# Patient Record
Sex: FEMALE | Race: BLACK OR AFRICAN AMERICAN | NOT HISPANIC OR LATINO | Employment: PART TIME | URBAN - METROPOLITAN AREA
[De-identification: names, ages, dates, MRNs, and addresses within clinical notes are randomized per-mention and may not be internally consistent; named-entity substitution may affect disease eponyms.]

---

## 2017-02-20 ENCOUNTER — ALLSCRIPTS OFFICE VISIT (OUTPATIENT)
Dept: OTHER | Facility: OTHER | Age: 47
End: 2017-02-20

## 2017-02-20 ENCOUNTER — APPOINTMENT (EMERGENCY)
Dept: ULTRASOUND IMAGING | Facility: HOSPITAL | Age: 47
End: 2017-02-20
Payer: COMMERCIAL

## 2017-02-20 ENCOUNTER — HOSPITAL ENCOUNTER (EMERGENCY)
Facility: HOSPITAL | Age: 47
Discharge: HOME | End: 2017-02-20
Attending: EMERGENCY MEDICINE
Payer: COMMERCIAL

## 2017-02-20 ENCOUNTER — APPOINTMENT (EMERGENCY)
Dept: RADIOLOGY | Facility: HOSPITAL | Age: 47
End: 2017-02-20
Payer: COMMERCIAL

## 2017-02-20 VITALS
RESPIRATION RATE: 20 BRPM | OXYGEN SATURATION: 100 % | TEMPERATURE: 97.5 F | DIASTOLIC BLOOD PRESSURE: 64 MMHG | HEART RATE: 78 BPM | SYSTOLIC BLOOD PRESSURE: 148 MMHG

## 2017-02-20 DIAGNOSIS — R07.89 CHEST DISCOMFORT: Primary | ICD-10-CM

## 2017-02-20 LAB
ALBUMIN SERPL BCP-MCNC: 4.2 G/DL (ref 3.5–5)
ALP SERPL-CCNC: 68 U/L (ref 46–116)
ALT SERPL W P-5'-P-CCNC: 52 U/L (ref 12–78)
ANION GAP SERPL CALCULATED.3IONS-SCNC: 9 MMOL/L (ref 4–13)
AST SERPL W P-5'-P-CCNC: 34 U/L (ref 5–45)
BASOPHILS # BLD AUTO: 0.02 THOUSANDS/ΜL (ref 0–0.1)
BASOPHILS NFR BLD AUTO: 0 % (ref 0–1)
BILIRUB SERPL-MCNC: 0.5 MG/DL (ref 0.2–1)
BUN SERPL-MCNC: 9 MG/DL (ref 5–25)
CALCIUM SERPL-MCNC: 9.3 MG/DL (ref 8.3–10.1)
CHLORIDE SERPL-SCNC: 100 MMOL/L (ref 100–108)
CO2 SERPL-SCNC: 30 MMOL/L (ref 21–32)
CREAT SERPL-MCNC: 0.75 MG/DL (ref 0.6–1.3)
EOSINOPHIL # BLD AUTO: 0.07 THOUSAND/ΜL (ref 0–0.61)
EOSINOPHIL NFR BLD AUTO: 1 % (ref 0–6)
ERYTHROCYTE [DISTWIDTH] IN BLOOD BY AUTOMATED COUNT: 12.9 % (ref 11.6–15.1)
GFR SERPL CREATININE-BSD FRML MDRD: >60 ML/MIN/1.73SQ M
GLUCOSE SERPL-MCNC: 116 MG/DL (ref 65–140)
HCT VFR BLD AUTO: 37 % (ref 34.8–46.1)
HGB BLD-MCNC: 11.9 G/DL (ref 11.5–15.4)
LYMPHOCYTES # BLD AUTO: 2.78 THOUSANDS/ΜL (ref 0.6–4.47)
LYMPHOCYTES NFR BLD AUTO: 38 % (ref 14–44)
MCH RBC QN AUTO: 27.7 PG (ref 26.8–34.3)
MCHC RBC AUTO-ENTMCNC: 32.2 G/DL (ref 31.4–37.4)
MCV RBC AUTO: 86 FL (ref 82–98)
MONOCYTES # BLD AUTO: 0.44 THOUSAND/ΜL (ref 0.17–1.22)
MONOCYTES NFR BLD AUTO: 6 % (ref 4–12)
NEUTROPHILS # BLD AUTO: 4.06 THOUSANDS/ΜL (ref 1.85–7.62)
NEUTS SEG NFR BLD AUTO: 55 % (ref 43–75)
NT-PROBNP SERPL-MCNC: 16 PG/ML
PLATELET # BLD AUTO: 331 THOUSANDS/UL (ref 149–390)
PMV BLD AUTO: 10.2 FL (ref 8.9–12.7)
POTASSIUM SERPL-SCNC: 3.7 MMOL/L (ref 3.5–5.3)
PROT SERPL-MCNC: 8.5 G/DL (ref 6.4–8.2)
RBC # BLD AUTO: 4.29 MILLION/UL (ref 3.81–5.12)
SODIUM SERPL-SCNC: 139 MMOL/L (ref 136–145)
TROPONIN I SERPL-MCNC: <0.02 NG/ML
WBC # BLD AUTO: 7.37 THOUSAND/UL (ref 4.31–10.16)

## 2017-02-20 PROCEDURE — 80053 COMPREHEN METABOLIC PANEL: CPT | Performed by: PHYSICIAN ASSISTANT

## 2017-02-20 PROCEDURE — 83880 ASSAY OF NATRIURETIC PEPTIDE: CPT | Performed by: PHYSICIAN ASSISTANT

## 2017-02-20 PROCEDURE — 85025 COMPLETE CBC W/AUTO DIFF WBC: CPT | Performed by: PHYSICIAN ASSISTANT

## 2017-02-20 PROCEDURE — 71020 HB CHEST X-RAY 2VW FRONTAL&LATL: CPT

## 2017-02-20 PROCEDURE — 36415 COLL VENOUS BLD VENIPUNCTURE: CPT | Performed by: PHYSICIAN ASSISTANT

## 2017-02-20 PROCEDURE — 93971 EXTREMITY STUDY: CPT

## 2017-02-20 PROCEDURE — 84484 ASSAY OF TROPONIN QUANT: CPT | Performed by: PHYSICIAN ASSISTANT

## 2017-02-20 PROCEDURE — 93005 ELECTROCARDIOGRAM TRACING: CPT | Performed by: PHYSICIAN ASSISTANT

## 2017-02-20 PROCEDURE — 99285 EMERGENCY DEPT VISIT HI MDM: CPT

## 2017-02-20 RX ORDER — AMLODIPINE BESYLATE 5 MG/1
TABLET ORAL
COMMUNITY
End: 2018-07-30 | Stop reason: SDUPTHER

## 2017-02-20 RX ORDER — LOSARTAN POTASSIUM 50 MG/1
TABLET ORAL
COMMUNITY
Start: 2016-05-31 | End: 2018-07-30 | Stop reason: SDUPTHER

## 2017-02-20 RX ORDER — HYDROCHLOROTHIAZIDE 25 MG/1
TABLET ORAL
COMMUNITY
Start: 2016-03-14 | End: 2018-04-10 | Stop reason: SDUPTHER

## 2017-02-21 LAB
ATRIAL RATE: 76 BPM
P AXIS: 55 DEGREES
PR INTERVAL: 174 MS
QRS AXIS: 9 DEGREES
QRSD INTERVAL: 86 MS
QT INTERVAL: 376 MS
QTC INTERVAL: 414 MS
T WAVE AXIS: 31 DEGREES
VENTRICULAR RATE: 73 BPM

## 2017-02-22 ENCOUNTER — ALLSCRIPTS OFFICE VISIT (OUTPATIENT)
Dept: OTHER | Facility: OTHER | Age: 47
End: 2017-02-22

## 2017-04-05 ENCOUNTER — HOSPITAL ENCOUNTER (EMERGENCY)
Facility: HOSPITAL | Age: 47
Discharge: HOME | End: 2017-04-05
Attending: EMERGENCY MEDICINE | Admitting: EMERGENCY MEDICINE
Payer: COMMERCIAL

## 2017-04-05 VITALS
OXYGEN SATURATION: 100 % | DIASTOLIC BLOOD PRESSURE: 77 MMHG | RESPIRATION RATE: 18 BRPM | HEART RATE: 95 BPM | WEIGHT: 293 LBS | SYSTOLIC BLOOD PRESSURE: 141 MMHG | TEMPERATURE: 98.4 F

## 2017-04-05 DIAGNOSIS — S09.90XA HEAD INJURY, INITIAL ENCOUNTER: Primary | ICD-10-CM

## 2017-04-05 PROCEDURE — 99284 EMERGENCY DEPT VISIT MOD MDM: CPT

## 2017-05-17 ENCOUNTER — GENERIC CONVERSION - ENCOUNTER (OUTPATIENT)
Dept: OTHER | Facility: OTHER | Age: 47
End: 2017-05-17

## 2017-06-26 ENCOUNTER — GENERIC CONVERSION - ENCOUNTER (OUTPATIENT)
Dept: OTHER | Facility: OTHER | Age: 47
End: 2017-06-26

## 2017-10-04 ENCOUNTER — APPOINTMENT (OUTPATIENT)
Dept: LAB | Facility: HOSPITAL | Age: 47
End: 2017-10-04

## 2017-10-04 ENCOUNTER — ALLSCRIPTS OFFICE VISIT (OUTPATIENT)
Dept: OTHER | Facility: OTHER | Age: 47
End: 2017-10-04

## 2017-10-04 DIAGNOSIS — G47.33 OBSTRUCTIVE SLEEP APNEA: ICD-10-CM

## 2017-10-04 DIAGNOSIS — I10 ESSENTIAL (PRIMARY) HYPERTENSION: ICD-10-CM

## 2017-10-04 DIAGNOSIS — F41.9 ANXIETY DISORDER: ICD-10-CM

## 2017-10-04 DIAGNOSIS — N39.0 URINARY TRACT INFECTION: ICD-10-CM

## 2017-10-04 DIAGNOSIS — R60.0 LOCALIZED EDEMA: ICD-10-CM

## 2017-10-04 DIAGNOSIS — E78.5 HYPERLIPIDEMIA: ICD-10-CM

## 2017-10-04 DIAGNOSIS — E66.01 MORBID (SEVERE) OBESITY DUE TO EXCESS CALORIES (HCC): ICD-10-CM

## 2017-10-04 DIAGNOSIS — R73.01 IMPAIRED FASTING GLUCOSE: ICD-10-CM

## 2017-10-04 LAB
BILIRUB UR QL STRIP: NORMAL
COLOR UR: NORMAL
GLUCOSE (HISTORICAL): NORMAL
HGB UR QL STRIP.AUTO: NORMAL
KETONES UR STRIP-MCNC: NORMAL MG/DL
LEUKOCYTE ESTERASE UR QL STRIP: NORMAL
NITRITE UR QL STRIP: NORMAL
PH UR STRIP.AUTO: 5 [PH]
PROT UR STRIP-MCNC: NORMAL MG/DL
SP GR UR STRIP.AUTO: 1.03
UROBILINOGEN UR QL STRIP.AUTO: 3

## 2017-10-04 PROCEDURE — 87086 URINE CULTURE/COLONY COUNT: CPT

## 2017-10-05 NOTE — PROGRESS NOTES
Assessment  1  UTI (urinary tract infection), bacterial (599 0,041 9) (N39 0,A49 9)   2  Morbid obesity due to excess calories (278 01) (E66 01)   3  Impaired fasting glucose (790 21) (R73 01)   4  Paresthesia of right foot (782 0) (R20 2)    Plan  Anxiety, Bilateral leg edema, Essential hypertension, Hyperlipidemia    · (1) TSH WITH FT4 REFLEX; Status:Active; Requested [id number]; Anxiety, Bilateral leg edema, Essential hypertension, Hyperlipidemia, Impaired fasting  glucose, Morbid obesity due to excess calories, Obstructive sleep apnea    · (1) COMPREHENSIVE METABOLIC PANEL; Status:Active; Requested for:04Oct2017;   Bilateral leg edema, Essential hypertension, Hyperlipidemia, Impaired fasting glucose,  Morbid obesity due to excess calories    · (1) CBC/PLT/DIFF; Status:Active; Requested for:04Oct2017;   Burning with urination    · Urine Dip Automated- POC; Status:Complete;   Done: 55FPN6865 07:58AM  Essential hypertension, Hyperlipidemia, Morbid obesity due to excess calories    · (1) LIPID PANEL FASTING W DIRECT LDL REFLEX; Status:Active; Requested  for:04Oct2017;   Impaired fasting glucose    · (1) HEMOGLOBIN A1C; Status:Active; Requested for:04Oct2017;   UTI (urinary tract infection), bacterial    · Ciprofloxacin HCl - 250 MG Oral Tablet; Take 1 tablet twice daily   · Phenazopyridine HCl - 200 MG Oral Tablet; Take one three times daily as needed  for burning   · (1) URINE CULTURE; Source:Urine, Clean Catch; Status:Active; Requested  CFL:[id number];     Discussion/Summary    1  UTI- Urine dip in office +leuks/ nitrates with +/- glucose  Will start Cipro as ordered and send urine out for culture  May take Pyridium prn  SE reviewed  Increase PO fluids, may try pure cranberry juice  Proper feminine hygiene reviewed  Tingling sensation right foot- will check labs given her H/O IFG  No rashes or wounds on exam  Continue compression stockings as these have been helping her symptoms  Encouraged weight loss  IFG- labs as ordered  Follow a low carb diet, regularly exercise  Obesity- weight loss advised  2 weeks for a f/u of chronic medical conditions/ review labs  Possible side effects of new medications were reviewed with the patient/guardian today  The treatment plan was reviewed with the patient/guardian  The patient/guardian understands and agrees with the treatment plan      Chief Complaint  Patient here with tingling in right leg and burning sensation in right foot on and off since last week  Also here with UTI symptoms frequency and burning with urination for 3 days  History of Present Illness  HPI: Pt presents by herself today for an acute visit  dysuria for the past 3 days frequency test was positive blood in urine or cloudy/ malodorous urineflank or abdominal pain, N/V/D, F/C  c/o an intermittent tingling sensation in her right footupward to medial ankle be a burning sensation as wellon the bottom of her right footwounds or rashesinjuries have an IFG, last at 123 in April 2016- she has been overdue for a routine follow up of chronic conditions for the past several months is concerned about diabetes, would like blood work done also thinks her weight contributes to her issues          Review of Systems    Constitutional: no fever,-not feeling poorly,-no chills-and-not feeling tired  Cardiovascular: lower extremity edema, but-no chest pain,-no intermittent leg claudication-and-no palpitations  Respiratory: no shortness of breath,-no cough,-no wheezing-and-no shortness of breath during exertion  Gastrointestinal: no abdominal pain,-no nausea,-no constipation-and-no diarrhea  Genitourinary: dysuria, but-no pelvic pain-and-no incontinence  Integumentary: no rashes-and-no skin wound  Neurological: numbness-and-tingling, but-as noted in HPI  ROS reviewed  Active Problems  1  Anxiety (300 00) (F41 9)   2  Bilateral leg edema (782 3) (R60 0)   3  Dyspnea (786 09) (R06 00)   4  Encounter for cervical Pap smear with pelvic exam (V76 2,V72 31) (Z01 419)   5  Encounter for screening mammogram for breast cancer (V76 12) (Z12 31)   6  Essential hypertension (401 9) (I10)   7  Hyperlipidemia (272 4) (E78 5)   8  Impaired fasting glucose (790 21) (R73 01)   9  Morbid obesity due to excess calories (278 01) (E66 01)   10  Obstructive sleep apnea (327 23) (G47 33)   11  Osteoarthritis of knee (715 36) (M17 10)   12  Right calf pain (729 5) (M79 661)   13  Seasonal allergies (477 9) (J30 2)   14  Venous stasis dermatitis (454 1) (I87 2)    Past Medical History  1  History of Achilles tendinitis, unspecified laterality   2  History of Acute maxillary sinusitis, recurrence not specified (461 0) (J01 00)   3  History of Acute non-recurrent maxillary sinusitis (461 0) (J01 00)   4  History of Acute serous otitis media, unspecified laterality   5  History of Acute upper respiratory infection (465 9) (J06 9)   6  History of Elevated blood pressure reading without diagnosis of hypertension (796 2)   (R03 0)   7  History of Encounter for screening mammogram for breast cancer (V76 12) (Z12 31)   8  History of acute bronchitis with bronchospasm (V12 69) (Z87 09)   9  History of anemia (V12 3) (Z86 2)   10  History of athlete's foot (V12 09) (Z86 19)   11  History of atopic dermatitis (V13 3) (Z87 2)   12  History of bronchitis (V12 69) (Z87 09)   13  History of bursitis (V13 59) (Z87 39)   14  History of chest pain (V13 89) (Z87 898)   15  History of dermatitis (V13 3) (Z87 2)   16  History of eczema (V13 3) (Z87 2)   17  History of fatigue (V13 89) (Z87 898)   18  History of scabies (V12 09) (Z86 19)   19  History of sinusitis (V12 69) (Z87 09)   20  History of upper respiratory infection (V12 09) (Z87 09)   21  History of upper respiratory infection (V12 09) (Z87 09)   22  History of Otitis media of both ears (382 9) (J76 93)   23  History of Plantar fasciitis (418 71) (M72 2)   24   History of Reactive airway disease (493 90) (J45 909)  Active Problems And Past Medical History Reviewed: The active problems and past medical history were reviewed and updated today  Family History  Father    1  Family history of Anemia (V18 2)   2  Family history of Hypertension (V17 49)   3  Family history of Stroke Syndrome (V17 1)  Maternal Grandmother    4  Family history of Breast Cancer (V16 3)   5  Family history of Diabetes Mellitus (V18 0)  Paternal Cousin    10  Family history of myocardial infarction (V17 3) (Z82 49)  Family History    7  Family history of Cancer    Social History   · Being A Social Drinker   · Never A Smoker  The social history was reviewed and updated today  The social history was reviewed and is unchanged  Surgical History  1  History of Oral Surgery Tooth Extraction    Current Meds   1  AmLODIPine Besylate 5 MG Oral Tablet; take 1 tablet bid  Requested for: 36IIM1660;   Last MW:[id number] Ordered   2  Aspirin 81 MG Oral Tablet Delayed Release; take 1 tab daily; Therapy: 03Xti9546 to Recorded   3  Fluticasone Propionate 50 MCG/ACT Nasal Suspension; use 2 spr  in each nostril daily; Therapy: 78NVX5772 to (Last Rx:24Nov2015)  Requested for: 90KDA5869 Ordered   4  Garlic 5192 MG Oral Tablet Delayed Release Recorded   5  HydroCHLOROthiazide 25 MG Oral Tablet; Take 1 tablet daily; Therapy: 64TXI5908 to (Margarita Arvizu)  Requested for: 26MGO9656; Last   [id number] Ordered   6  Losartan Potassium 50 MG Oral Tablet; take 1 tab daily; Therapy: 10LOS1537 to (Margarita Arvizu)  Requested for: 21JMY6464; Last   [id number]:[id number] Ordered   7  Lysine 500 MG TABS Recorded   8  Omega 3 1000 MG Oral Capsule; Take 1 caps BID; Therapy: 69KRS1126 to (Last TE:31ZRI4207) Ordered    The medication list was reviewed and updated today  Allergies  1  CeleBREX CAPS  2   Animal dander - Cats    Vitals   Recorded: 46MLV3488 07:47AM   Temperature 97 4 F, Tympanic   Heart Rate 80, L Radial   Respiration 16 Systolic 807, LUE   Diastolic 82, LUE   Height 5 ft 5 in   Weight 279 lb    BMI Calculated 46 43   BSA Calculated 2 28   Pain Scale 0     Physical Exam    Constitutional   General appearance: No acute distress, well appearing and well nourished  Eyes   Conjunctiva and lids: No swelling, erythema or discharge  Pupils and irises: Equal, round and reactive to light  Pulmonary   Respiratory effort: No increased work of breathing or signs of respiratory distress  Auscultation of lungs: Clear to auscultation  Cardiovascular   Auscultation of heart: Normal rate and rhythm, normal S1 and S2, without murmurs  Examination of extremities for edema and/or varicosities: Abnormal  -(trace B/L LE edema)   Abdomen   Abdomen: Non-tender, no masses  The abdomen was obese  Bowel sounds were normal  The abdomen was soft and nontender  no CVA tenderness   Musculoskeletal   Gait and station: Normal  -Normal sensation to B/L LE/ feet  Wearing compression stockings which were removed for exam    Skin   Skin and subcutaneous tissue: Normal without rashes or lesions  Psychiatric   Orientation to person, place, and time: Normal     Mood and affect: Normal          Results/Data  Urine Dip Automated- POC 32PDZ8201 07:58AM Mook Nettles     Test Name Result Flag Reference   Color Orange     Leukocytes 3+     Nitrite +     Blood neg     Bilirubin neg     Urobilinogen 3     Protein +     Ph 5 0     Specific Gravity 1 030     Ketone 3+     Glucose +         Attending Note  Collaborating Physician Note: Collaborating Note: I did not interview and examine the patient-and-I agree with the Advanced Practitioner note  Signatures   Electronically signed by : SARAH Lockwood; Oct  4 2017  8:48AM EST                       (Author)    Electronically signed by :  Tracey Chambers MD; Oct  4 2017  4:53PM EST                       (Author)

## 2017-10-06 ENCOUNTER — GENERIC CONVERSION - ENCOUNTER (OUTPATIENT)
Dept: OTHER | Facility: OTHER | Age: 47
End: 2017-10-06

## 2017-10-06 LAB — BACTERIA UR CULT: NORMAL

## 2017-11-10 ENCOUNTER — APPOINTMENT (OUTPATIENT)
Dept: RADIOLOGY | Facility: CLINIC | Age: 47
End: 2017-11-10
Payer: COMMERCIAL

## 2017-11-10 ENCOUNTER — ALLSCRIPTS OFFICE VISIT (OUTPATIENT)
Dept: OTHER | Facility: OTHER | Age: 47
End: 2017-11-10

## 2017-11-10 DIAGNOSIS — M25.579 PAIN IN ANKLE: ICD-10-CM

## 2017-11-10 DIAGNOSIS — S92.009A CLOSED FRACTURE OF CALCANEUS: ICD-10-CM

## 2017-11-10 DIAGNOSIS — M89.9 DISORDER OF BONE: ICD-10-CM

## 2017-11-10 DIAGNOSIS — M25.569 PAIN IN KNEE: ICD-10-CM

## 2017-11-10 PROCEDURE — 73564 X-RAY EXAM KNEE 4 OR MORE: CPT

## 2017-11-10 PROCEDURE — 73610 X-RAY EXAM OF ANKLE: CPT

## 2017-11-13 ENCOUNTER — TRANSCRIBE ORDERS (OUTPATIENT)
Dept: ADMINISTRATIVE | Facility: HOSPITAL | Age: 47
End: 2017-11-13

## 2017-11-13 DIAGNOSIS — M25.579 PAIN IN JOINT INVOLVING ANKLE AND FOOT, UNSPECIFIED LATERALITY: Primary | ICD-10-CM

## 2017-11-17 ENCOUNTER — HOSPITAL ENCOUNTER (OUTPATIENT)
Dept: RADIOLOGY | Age: 47
Discharge: HOME | End: 2017-11-17
Payer: COMMERCIAL

## 2017-11-17 DIAGNOSIS — M25.579 PAIN IN ANKLE: ICD-10-CM

## 2017-11-17 PROCEDURE — 73721 MRI JNT OF LWR EXTRE W/O DYE: CPT

## 2017-11-20 ENCOUNTER — APPOINTMENT (OUTPATIENT)
Dept: RADIOLOGY | Facility: CLINIC | Age: 47
End: 2017-11-20
Payer: COMMERCIAL

## 2017-11-20 ENCOUNTER — GENERIC CONVERSION - ENCOUNTER (OUTPATIENT)
Dept: OTHER | Facility: OTHER | Age: 47
End: 2017-11-20

## 2017-11-20 DIAGNOSIS — M25.569 PAIN IN KNEE: ICD-10-CM

## 2017-11-20 PROCEDURE — 73590 X-RAY EXAM OF LOWER LEG: CPT

## 2017-11-20 PROCEDURE — 73562 X-RAY EXAM OF KNEE 3: CPT

## 2017-11-24 ENCOUNTER — TRANSCRIBE ORDERS (OUTPATIENT)
Dept: ADMINISTRATIVE | Facility: HOSPITAL | Age: 47
End: 2017-11-24

## 2017-11-24 DIAGNOSIS — M89.9 BONE DISEASE: Primary | ICD-10-CM

## 2018-01-12 NOTE — RESULT NOTES
Message   Call patient  Blood work showed elevated blood sugar and cholesterol  Advise patient to schedule followup office visit  to discuss lab results,  treatment options  Verified Results  (1) COMPREHENSIVE METABOLIC PANEL 03GAR8200 41:60BA CubeTreenancy Kiran     Test Name Result Flag Reference   GLUCOSE 123 mg/dL H 65-99   Fasting reference interval   UREA NITROGEN (BUN) 16 mg/dL  7-25   CREATININE 0 69 mg/dL  0 50-1 10   eGFR NON-AFR  AMERICAN 106 mL/min/1 73m2  > OR = 60   eGFR AFRICAN AMERICAN 123 mL/min/1 73m2  > OR = 60   BUN/CREATININE RATIO   8-43   NOT APPLICABLE (calc)   SODIUM 138 mmol/L  135-146   POTASSIUM 4 1 mmol/L  3 5-5 3   CHLORIDE 101 mmol/L     CARBON DIOXIDE 27 mmol/L  19-30   CALCIUM 9 3 mg/dL  8 6-10 2   PROTEIN, TOTAL 7 2 g/dL  6 1-8 1   ALBUMIN 4 2 g/dL  3 6-5 1   GLOBULIN 3 0 g/dL (calc)  1 9-3 7   ALBUMIN/GLOBULIN RATIO 1 4 (calc)  1 0-2 5   BILIRUBIN, TOTAL 0 4 mg/dL  0 2-1 2   ALKALINE PHOSPHATASE 65 U/L     AST 21 U/L  10-30   ALT 22 U/L  6-29     (1) LIPID PANEL, FASTING 25Apr2016 08:20AM Mendeleymerry Kiran     Test Name Result Flag Reference   CHOLESTEROL, TOTAL 239 mg/dL H 125-200   HDL CHOLESTEROL 31 mg/dL L > OR = 46   TRIGLICERIDES 088 mg/dL H <150   LDL-CHOLESTEROL 172 mg/dL (calc) H <130   Desirable range <100 mg/dL for patients with CHD or  diabetes and <70 mg/dL for diabetic patients with  known heart disease  CHOL/HDLC RATIO 7 7 (calc) H < OR = 5 0   NON HDL CHOLESTEROL 208 mg/dL (calc) H    Target for non-HDL cholesterol is 30 mg/dL higher than   LDL cholesterol target       (Q) TSH, 3RD GENERATION 25Apr2016 08:20AM [facility] Kiran   REPORT COMMENT:  FASTING:YES     Test Name Result Flag Reference   TSH 2 79 mIU/L     Reference Range                         > or = 20 Years  0 40-4 50                              Pregnancy Ranges            First trimester    0 26-2 66            Second trimester   0 55-2 73            Third trimester 0  43-2 91

## 2018-01-12 NOTE — RESULT NOTES
Verified Results  (1) URINE CULTURE 81WMC1092 12:46PM Aida Golden Order Number: [id number]     Test Name Result Flag Reference   CLINICAL REPORT (Report)     Test:        Urine culture  Specimen Type:   Urine  Specimen Date:   10/4/2017 12:46 PM  Result Date:    10/6/2017 8:15 AM  Result Status:   Final result  Resulting Lab:   [place] [address]            Tel: [phone]      CULTURE                                       ------------------                                   <10,000 cfu/ml      *** Mixed Contaminants X3 ***

## 2018-01-13 VITALS
HEIGHT: 65 IN | TEMPERATURE: 98.3 F | BODY MASS INDEX: 48.82 KG/M2 | HEART RATE: 100 BPM | WEIGHT: 293 LBS | DIASTOLIC BLOOD PRESSURE: 82 MMHG | OXYGEN SATURATION: 100 % | SYSTOLIC BLOOD PRESSURE: 120 MMHG | RESPIRATION RATE: 16 BRPM

## 2018-01-13 VITALS
HEIGHT: 65 IN | RESPIRATION RATE: 16 BRPM | WEIGHT: 279 LBS | TEMPERATURE: 97.4 F | DIASTOLIC BLOOD PRESSURE: 82 MMHG | HEART RATE: 80 BPM | BODY MASS INDEX: 46.48 KG/M2 | SYSTOLIC BLOOD PRESSURE: 110 MMHG

## 2018-01-13 VITALS
BODY MASS INDEX: 48.82 KG/M2 | TEMPERATURE: 97.3 F | OXYGEN SATURATION: 97 % | RESPIRATION RATE: 16 BRPM | HEART RATE: 88 BPM | DIASTOLIC BLOOD PRESSURE: 86 MMHG | HEIGHT: 65 IN | SYSTOLIC BLOOD PRESSURE: 118 MMHG | WEIGHT: 293 LBS

## 2018-01-13 VITALS
BODY MASS INDEX: 44.86 KG/M2 | WEIGHT: 269.25 LBS | HEIGHT: 65 IN | SYSTOLIC BLOOD PRESSURE: 109 MMHG | DIASTOLIC BLOOD PRESSURE: 73 MMHG | HEART RATE: 101 BPM

## 2018-01-13 NOTE — MISCELLANEOUS
Message  Return to work or school:   Brown Menard is under my professional care  She was seen in my office on 11/10/17     She is not able to participate in sports or gym class  Weight Bearing Status: No Weight-Bearing  I recommend patient remain non weight bearing and rest at this time  I recommend against work until further evaluation at next visit          Signatures   Electronically signed by : Rossana Domínguez DO; Nov 13 2017  1:05PM EST                       (Author)

## 2018-01-22 VITALS
SYSTOLIC BLOOD PRESSURE: 118 MMHG | WEIGHT: 269.25 LBS | DIASTOLIC BLOOD PRESSURE: 74 MMHG | HEART RATE: 75 BPM | BODY MASS INDEX: 44.86 KG/M2 | HEIGHT: 65 IN

## 2018-04-10 DIAGNOSIS — I10 ESSENTIAL HYPERTENSION: Primary | ICD-10-CM

## 2018-04-10 RX ORDER — HYDROCHLOROTHIAZIDE 25 MG/1
TABLET ORAL
Qty: 90 TABLET | Refills: 1 | Status: SHIPPED | OUTPATIENT
Start: 2018-04-10 | End: 2018-10-07 | Stop reason: SDUPTHER

## 2018-04-20 ENCOUNTER — TELEPHONE (OUTPATIENT)
Dept: FAMILY MEDICINE CLINIC | Facility: CLINIC | Age: 48
End: 2018-04-20

## 2018-04-20 DIAGNOSIS — J30.1 SEASONAL ALLERGIC RHINITIS DUE TO POLLEN: Primary | ICD-10-CM

## 2018-04-20 RX ORDER — FLUTICASONE PROPIONATE 50 MCG
2 SPRAY, SUSPENSION (ML) NASAL DAILY
Qty: 16 G | Refills: 3 | Status: SHIPPED | OUTPATIENT
Start: 2018-04-20 | End: 2019-03-21 | Stop reason: SDUPTHER

## 2018-07-02 PROBLEM — R06.00 DYSPNEA: Status: ACTIVE | Noted: 2017-02-20

## 2018-07-02 PROBLEM — I87.2 VENOUS STASIS DERMATITIS: Status: ACTIVE | Noted: 2017-02-22

## 2018-07-02 PROBLEM — R60.0 BILATERAL LEG EDEMA: Status: ACTIVE | Noted: 2017-02-22

## 2018-07-02 RX ORDER — CHLORAL HYDRATE 500 MG
CAPSULE ORAL 2 TIMES DAILY
COMMUNITY
Start: 2016-05-09 | End: 2020-06-18 | Stop reason: ALTCHOICE

## 2018-07-03 ENCOUNTER — OFFICE VISIT (OUTPATIENT)
Dept: FAMILY MEDICINE CLINIC | Facility: CLINIC | Age: 48
End: 2018-07-03
Payer: COMMERCIAL

## 2018-07-03 VITALS
RESPIRATION RATE: 16 BRPM | HEART RATE: 60 BPM | DIASTOLIC BLOOD PRESSURE: 82 MMHG | TEMPERATURE: 97.2 F | SYSTOLIC BLOOD PRESSURE: 120 MMHG | WEIGHT: 261.6 LBS | BODY MASS INDEX: 43.53 KG/M2

## 2018-07-03 DIAGNOSIS — R11.0 NAUSEA: Primary | ICD-10-CM

## 2018-07-03 DIAGNOSIS — R09.82 PND (POST-NASAL DRIP): ICD-10-CM

## 2018-07-03 DIAGNOSIS — Z12.39 SCREENING FOR MALIGNANT NEOPLASM OF BREAST: ICD-10-CM

## 2018-07-03 PROCEDURE — 99213 OFFICE O/P EST LOW 20 MIN: CPT | Performed by: NURSE PRACTITIONER

## 2018-07-03 RX ORDER — ONDANSETRON 4 MG/1
TABLET, FILM COATED ORAL
Qty: 20 TABLET | Refills: 0 | Status: SHIPPED | OUTPATIENT
Start: 2018-07-03 | End: 2020-03-24 | Stop reason: SDUPTHER

## 2018-07-03 NOTE — PROGRESS NOTES
Chief Complaint   Patient presents with    Nausea     Symptoms nausea and bloating since last Wednesday  Assessment/Plan:    1  Nausea-  Likely related to her increase in PND  If nausea persists despite treating PND, may take Zofran 4mg one tab Q6-8h prn  Peppermint, yimi, etc   Avoid spicy foods, dairy products, caffeine  Call if nausea worsens or persists    2  PND- continue Flonase 2 sprays/ nostril once daily  Stop Claritin and start Zyrtec to see if symptoms are better controlled on this    3  Mammogram ordered today  Advised to schedule a  visit in the upcoming weeks  Advised to have blood work done now but she declined this  Stated she wants to work on her new diet prior to having any labs drawn     Diagnoses and all orders for this visit:    Nausea  -     ondansetron (ZOFRAN) 4 mg tablet; Take one tab every 6-8 hours prn for nausea    Screening for malignant neoplasm of breast  -     Mammo screening bilateral w cad; Future    PND (post-nasal drip)    Other orders  -     b complex vitamins tablet; Take 1 tablet by mouth daily          Subjective:      Patient ID: Gege Meeks is a 56 y o  female      HPI   Pt presents by herself today for an acute visit     C/o bouts of nausea on and off for the past week  Notes this isn't uncommon for her, but a little worse   Reports her PND has also been worse- she feels this is causing her nausea  No abdominal pain, vomiting, diarrhea, constipation   No change in appetite or unexpected weight loss   No correlation with eating and her nausea  Reports she does get reflux at times but this has been well controlled (tries to avoid eating before bed/ lying flat)    Reports she just changed her diet to plant based  Has some abdominal bloating at times (noticed after changing her diet)    For her PND, takes Claritin nightly and uses Flonase nasal spray     Also requesting a script for a mammogram   Has never had a mammogram before     The following portions of the patient's history were reviewed and updated as appropriate: allergies, current medications, past medical history, past social history and problem list     Review of Systems   Constitutional: Negative for chills, diaphoresis, fatigue and fever  Respiratory: Negative for chest tightness, shortness of breath and wheezing  Cardiovascular: Negative for chest pain, palpitations and leg swelling  Gastrointestinal: Positive for nausea  Negative for abdominal pain, blood in stool, constipation and diarrhea  Genitourinary: Negative for dysuria  Musculoskeletal: Negative for arthralgias and myalgias  Skin: Negative for rash and wound  Neurological: Negative for dizziness and headaches  Hematological: Negative for adenopathy  Does not bruise/bleed easily  Objective:      /82 (BP Location: Left arm, Patient Position: Sitting, Cuff Size: Large)   Pulse 60   Temp (!) 97 2 °F (36 2 °C) (Tympanic)   Resp 16   Wt 119 kg (261 lb 9 6 oz)   BMI 43 53 kg/m²          Physical Exam   Constitutional: She is oriented to person, place, and time  She appears well-developed and well-nourished  No distress  HENT:   Head: Normocephalic and atraumatic  Eyes: Conjunctivae are normal  Pupils are equal, round, and reactive to light  Cardiovascular: Normal rate, regular rhythm and normal heart sounds  No murmur heard  Pulmonary/Chest: Effort normal and breath sounds normal  No respiratory distress  She has no wheezes  Abdominal: Soft  Bowel sounds are normal  She exhibits no distension, no fluid wave, no ascites and no mass  There is no hepatosplenomegaly  There is no tenderness  There is no rigidity, no rebound, no guarding, no CVA tenderness, no tenderness at McBurney's point and negative Brumfield's sign  Neurological: She is alert and oriented to person, place, and time  Skin: Skin is warm and dry  She is not diaphoretic  Psychiatric: She has a normal mood and affect

## 2018-07-03 NOTE — PATIENT INSTRUCTIONS
Stop Claritin and start Zyrtec as needed  Continue Flonase    May take Zofran 4mg one tab every 6-8 hours as needed for nausea  Peppermint, ginger, etc    Schedule mammogram

## 2018-07-30 DIAGNOSIS — I10 ESSENTIAL HYPERTENSION: Primary | ICD-10-CM

## 2018-07-30 RX ORDER — AMLODIPINE BESYLATE 5 MG/1
TABLET ORAL
Qty: 180 TABLET | Refills: 3 | Status: SHIPPED | OUTPATIENT
Start: 2018-07-30 | End: 2019-07-28 | Stop reason: SDUPTHER

## 2018-07-30 RX ORDER — LOSARTAN POTASSIUM 50 MG/1
TABLET ORAL
Qty: 90 TABLET | Refills: 3 | Status: SHIPPED | OUTPATIENT
Start: 2018-07-30 | End: 2019-07-28 | Stop reason: SDUPTHER

## 2018-08-23 ENCOUNTER — HOSPITAL ENCOUNTER (EMERGENCY)
Facility: HOSPITAL | Age: 48
Discharge: HOME | End: 2018-08-23
Attending: EMERGENCY MEDICINE | Admitting: EMERGENCY MEDICINE
Payer: COMMERCIAL

## 2018-08-23 ENCOUNTER — APPOINTMENT (EMERGENCY)
Dept: RADIOLOGY | Facility: HOSPITAL | Age: 48
End: 2018-08-23
Payer: COMMERCIAL

## 2018-08-23 VITALS
HEART RATE: 64 BPM | OXYGEN SATURATION: 98 % | SYSTOLIC BLOOD PRESSURE: 124 MMHG | RESPIRATION RATE: 18 BRPM | WEIGHT: 262.35 LBS | TEMPERATURE: 98 F | DIASTOLIC BLOOD PRESSURE: 78 MMHG | HEIGHT: 65 IN | BODY MASS INDEX: 43.71 KG/M2

## 2018-08-23 DIAGNOSIS — M46.1 SACROILIITIS (HCC): Primary | ICD-10-CM

## 2018-08-23 PROCEDURE — 99283 EMERGENCY DEPT VISIT LOW MDM: CPT

## 2018-08-23 PROCEDURE — 72200 X-RAY EXAM SI JOINTS: CPT

## 2018-08-23 RX ORDER — LIDOCAINE 50 MG/G
1 PATCH TOPICAL DAILY
Qty: 3 PATCH | Refills: 0 | Status: SHIPPED | OUTPATIENT
Start: 2018-08-23 | End: 2022-03-14 | Stop reason: ALTCHOICE

## 2018-08-23 RX ORDER — LIDOCAINE 50 MG/G
1 PATCH TOPICAL ONCE
Status: DISCONTINUED | OUTPATIENT
Start: 2018-08-23 | End: 2018-08-23 | Stop reason: HOSPADM

## 2018-08-23 RX ADMIN — LIDOCAINE 1 PATCH: 50 PATCH TOPICAL at 08:02

## 2018-08-23 RX ADMIN — DEXAMETHASONE SODIUM PHOSPHATE 10 MG: 10 INJECTION, SOLUTION INTRAMUSCULAR; INTRAVENOUS at 08:02

## 2018-08-23 NOTE — ED PROVIDER NOTES
History  Chief Complaint   Patient presents with    Back Pain     pt with lower back pain down her left leg has been going on for approx 1 week and is not getting any better      HPI  This patient is a 70-year-old woman who presents complaining of left lower back pain  The patient states that 1 week ago she had onset of left lower back pain after sitting in cars multiple times for prolonged periods  The pain is achy, intermittent but has been getting more severe and with more consistency over the past few days  There is radiation of pain down to the buttocks  The pain is worse with certain movements  No radiation of pain down the leg itself  No weakness, numbness, tingling in legs  No urinary retention  No bowel or bladder incontinence  No fever or chills  No dysuria  Patient has tried Aleve with minimal relief  Prior to Admission Medications   Prescriptions Last Dose Informant Patient Reported? Taking?    Garlic 0637 MG TBEC  Self Yes No   Sig: Take by mouth   Omega-3 1000 MG CAPS  Self Yes No   Sig: Take by mouth 2 (two) times a day   amLODIPine (NORVASC) 5 mg tablet   No No   Sig: TAKE 1 TABLET TWICE A DAY   aspirin 81 MG tablet  Self Yes No   Sig: Take 1 tablet by mouth daily   b complex vitamins tablet   Yes No   Sig: Take 1 tablet by mouth daily   fluticasone (FLONASE) 50 mcg/act nasal spray  Self No No   Si sprays into each nostril daily   hydrochlorothiazide (HYDRODIURIL) 25 mg tablet  Self No No   Sig: TAKE 1 TABLET DAILY   losartan (COZAAR) 50 mg tablet   No No   Sig: TAKE 1 TABLET DAILY   lysine 500 MG TABS  Self Yes No   Sig: Take by mouth   ondansetron (ZOFRAN) 4 mg tablet   No No   Sig: Take one tab every 6-8 hours prn for nausea      Facility-Administered Medications: None       Past Medical History:   Diagnosis Date    Anemia     Last Assessed: 2013    Concussion     Eczema     Hypertension     Reactive airway disease     Sleep apnea        Past Surgical History: Procedure Laterality Date    TOOTH EXTRACTION         Family History   Problem Relation Age of Onset    Anemia Father     Hypertension Father     Stroke Father         stroke syndrome    Breast cancer Maternal Grandmother     Diabetes Maternal Grandmother     Heart attack Cousin      I have reviewed and agree with the history as documented  Social History   Substance Use Topics    Smoking status: Never Smoker    Smokeless tobacco: Never Used    Alcohol use No      Comment: Per [employer]: socially        Review of Systems   Constitutional: Negative for chills and fever  Respiratory: Negative for cough and shortness of breath  Cardiovascular: Negative for chest pain  Gastrointestinal: Negative for abdominal pain, nausea and vomiting  Genitourinary: Negative for dysuria  Musculoskeletal: Positive for back pain  Skin: Negative for rash  Neurological: Negative for weakness and numbness  Physical Exam  Physical Exam   Constitutional: She appears well-developed and well-nourished  HENT:   Head: Normocephalic and atraumatic  Eyes: Conjunctivae are normal  Pupils are equal, round, and reactive to light  Neck: Normal range of motion  Cardiovascular: Normal rate, regular rhythm, normal heart sounds and intact distal pulses  Pulmonary/Chest: Effort normal and breath sounds normal  No respiratory distress  Abdominal: Soft  She exhibits no distension  There is no tenderness  Musculoskeletal: Normal range of motion  She exhibits no edema  No L-spine tenderness  No step-off or deformity  There is tenderness over the left SI joint  Distal neurovascular intact  Neurological: She is alert  No focal deficit   Vitals reviewed        Vital Signs  ED Triage Vitals   Temperature Pulse Respirations Blood Pressure SpO2   08/23/18 0706 08/23/18 0706 08/23/18 0706 08/23/18 0706 08/23/18 0706   98 °F (36 7 °C) 78 18 127/77 100 %      Temp Source Heart Rate Source Patient Position - Orthostatic VS BP Location FiO2 (%)   08/23/18 0706 -- 08/23/18 0815 08/23/18 0706 --   Oral  Lying Left arm       Pain Score       08/23/18 0703       7           Vitals:    08/23/18 0706 08/23/18 0815   BP: 127/77 124/78   Pulse: 78 64   Patient Position - Orthostatic VS:  Lying       Visual Acuity      ED Medications  Medications   lidocaine (LIDODERM) 5 % patch 1 patch (1 patch Transdermal Medication Applied 8/23/18 0802)   dexamethasone 10 mg/mL oral liquid 10 mg 1 mL (10 mg Oral Given 8/23/18 0802)       Diagnostic Studies  Results Reviewed     None                 XR sacroiliac joints < 3 views   ED Interpretation by Roseanne Lazo MD (08/23 0759)   No acute abnormality  Mild decrease in left SI joint space as compared to right  Procedures  Procedures       Phone Contacts  ED Phone Contact    ED Course                               MDM  Number of Diagnoses or Management Options  [facility]   Diagnosis management comments: 67-year-old woman presents with acute left lower back pain  No flags on history or exam   History exam consistent with left sacroiliitis  Discussed conservative management with steroids, NSAIDS, lidoderm patch  Recommended considering physical therapy if no improvement in the next few days  Will follow-up with primary care  Amount and/or Complexity of Data Reviewed  Tests in the radiology section of CPT®: ordered and reviewed      CritCare Time    Disposition  Final diagnoses:   Sacroiliitis ([address] )     Time reflects when diagnosis was documented in both MDM as applicable and the Disposition within this note     Time User Action Codes Description Comment    8/23/2018  8:00 AM Conrad Loffler, Justus Boeck A Add [M46 1] Sacroiliitis [facility])       ED Disposition     ED Disposition Condition Comment    Discharge  [address] [place] discharge to home/self care      Condition at discharge: Good        Follow-up Information     Follow up With Specialties Details Why Contact Info Fausto Castellanos MD Family Medicine Call in 1 day  [address] [facility]  [phone]            Patient's Medications   Discharge Prescriptions    LIDOCAINE (LIDODERM) 5 %    Place 1 patch on the skin daily for 3 days Remove & Discard patch within 12 hours or as directed by MD       Start Date: 8/23/2018 End Date: 8/26/2018       Order Dose: 1 patch       Quantity: 3 patch    Refills: 0     No discharge procedures on file      ED Provider  Electronically Signed by           Carmen Evans MD  08/23/18 7196

## 2018-08-23 NOTE — DISCHARGE INSTRUCTIONS
Sacroiliitis   WHAT YOU NEED TO KNOW:   Sacroiliitis is a painful swelling of one or both of your sacroiliac joints that lasts at least 3 months  The sacroiliac joint connects your pelvis to the base of your spine  DISCHARGE INSTRUCTIONS:   Medicines:  Ask for more information about these and other medicines you may need to treat sacroiliitis:  · Pain medicine: You may be given medicine to take away or decrease pain  Do not wait until the pain is severe before you take your medicine  You may be given the medicine as a pill to swallow or as a lotion that you put on the painful area  · NSAIDs  help decrease swelling and pain or fever  This medicine is available with or without a doctor's order  NSAIDs can cause stomach bleeding or kidney problems in certain people  If you take blood thinner medicine, always ask your healthcare provider if NSAIDs are safe for you  Always read the medicine label and follow directions  · Muscle relaxers  help decrease pain and muscle spasms  · Take your medicine as directed  Contact your healthcare provider if you think your medicine is not helping or if you have side effects  Tell him of her if you are allergic to any medicine  Keep a list of the medicines, vitamins, and herbs you take  Include the amounts, and when and why you take them  Bring the list or the pill bottles to follow-up visits  Carry your medicine list with you in case of an emergency  Physical therapy:  Your healthcare provider may suggest physical therapy  Your physical therapist may teach you exercises to improve posture (the way you stand and sit), flexibility, and strength in your lower back  He may also teach you how to remain safely active and avoid further injury  Follow the exercise plan given to you by your physical therapist   Rest:  Follow your healthcare provider's instructions about how much rest you should get  Avoid activity that worsens your pain    Ice or heat packs:  Use ice or heat packs on the sore area of your body to decrease the pain and swelling  Put ice in a plastic bag covered with a towel on your low back  Cover heated items with a towel to avoid burns  Use ice and heat as directed  Follow up with your healthcare provider or spine specialist within 1 to 2 weeks:  Write down your questions so you remember to ask them during your visits  Contact your healthcare provider or spine specialist if:   · Your pain makes it hard for you to do your daily activities, such as work or school  · Your pain does not go away after treatment  · You feel depressed or anxious  · You have questions about your condition or care  Return to the emergency department if:   · You have a fever  · Your pain is worse than before  · Your pain prevents you from sleeping  © 2017 [facility] Information is for End User's use only and may not be sold, redistributed or otherwise used for commercial purposes  All illustrations and images included in CareNotes® are the copyrighted property of A D A M [employer] Inc  or Kerwin Talamantes  The above information is an  only  It is not intended as medical advice for individual conditions or treatments  Talk to your doctor, nurse or pharmacist before following any medical regimen to see if it is safe and effective for you

## 2018-10-07 DIAGNOSIS — I10 ESSENTIAL HYPERTENSION: ICD-10-CM

## 2018-10-07 RX ORDER — HYDROCHLOROTHIAZIDE 25 MG/1
TABLET ORAL
Qty: 90 TABLET | Refills: 0 | Status: SHIPPED | OUTPATIENT
Start: 2018-10-07 | End: 2019-01-05 | Stop reason: SDUPTHER

## 2019-01-05 DIAGNOSIS — I10 ESSENTIAL HYPERTENSION: ICD-10-CM

## 2019-01-05 RX ORDER — HYDROCHLOROTHIAZIDE 25 MG/1
TABLET ORAL
Qty: 90 TABLET | Refills: 0 | Status: SHIPPED | OUTPATIENT
Start: 2019-01-05 | End: 2019-04-05 | Stop reason: SDUPTHER

## 2019-01-05 NOTE — TELEPHONE ENCOUNTER
Please call patient  I refilled Rx for HCTZ 25 mg daily ( pharmacy sent a request to refill Rx)  Patient has been followed by [facility]  She needs to come for follow-up visit

## 2019-03-06 ENCOUNTER — OFFICE VISIT (OUTPATIENT)
Dept: FAMILY MEDICINE CLINIC | Facility: CLINIC | Age: 49
End: 2019-03-06
Payer: COMMERCIAL

## 2019-03-06 VITALS
DIASTOLIC BLOOD PRESSURE: 74 MMHG | SYSTOLIC BLOOD PRESSURE: 118 MMHG | TEMPERATURE: 97.9 F | HEIGHT: 65 IN | RESPIRATION RATE: 20 BRPM | OXYGEN SATURATION: 97 % | BODY MASS INDEX: 45.15 KG/M2 | WEIGHT: 271 LBS | HEART RATE: 84 BPM

## 2019-03-06 DIAGNOSIS — M46.1 SACROILIITIS (HCC): Primary | ICD-10-CM

## 2019-03-06 PROCEDURE — 99213 OFFICE O/P EST LOW 20 MIN: CPT | Performed by: NURSE PRACTITIONER

## 2019-03-06 PROCEDURE — 1036F TOBACCO NON-USER: CPT | Performed by: NURSE PRACTITIONER

## 2019-03-06 PROCEDURE — 3008F BODY MASS INDEX DOCD: CPT | Performed by: NURSE PRACTITIONER

## 2019-03-06 RX ORDER — PREDNISONE 10 MG/1
10 TABLET ORAL DAILY
Qty: 20 TABLET | Refills: 0 | Status: SHIPPED | OUTPATIENT
Start: 2019-03-06 | End: 2019-03-14

## 2019-03-06 RX ORDER — GINGER ROOT
POWDER (GRAM) MISCELLANEOUS
COMMUNITY

## 2019-03-06 NOTE — LETTER
March 6, 2019     Patient: Pj Chase   YOB: 1970   Date of Visit: 3/6/2019       To Whom it May Concern:    Pj Chase is under my professional care  She was seen in my office on 3/6/2019  She may return to work on 3/8/19  If you have any questions or concerns, please don't hesitate to call           Sincerely,          SARAH Colindres        CC: No Recipients

## 2019-03-06 NOTE — PROGRESS NOTES
Chief Complaint   Patient presents with    Back Pain     Health Maintenance   Topic Date Due    Depression Screening PHQ  1970    MAMMOGRAM  1970    BMI: Followup Plan  08/23/1989    DTaP,Tdap,and Td Vaccines (1 - Tdap) 08/23/1992    PAP SMEAR  08/23/1992    BMI: Adult  03/06/2020    INFLUENZA VACCINE  Completed    HEPATITIS B VACCINES  Aged Out     Assessment/Plan:    Sacroiliitis (HCC)  No red flags on exam today, will hold off on any imaging for now  Likely exacerbated from prolonged car rides last week  We will treat with PO Prednisone taper, SE reviewed today  No NSAIDs while on this, Tylenol OTC is fine if needed (max of 3g/24 hours)  She has OTC Lidocaine patches at home and she may continue to use these  Moist heat to area, gentle stretching  Call office if symptoms worsen or persist       Diagnoses and all orders for this visit:    Sacroiliitis ([address] )  -     predniSONE 10 mg tablet; Take 1 tablet (10 mg total) by mouth daily for 8 days Take 4 tabs for 2 days, 3 tabs for 2 days, 2 tabs for 2 days and 1 tab for 2 days    Other orders  -     Cannabidiol 100 MG/ML SOLN; Take by mouth  -     Shannan Root POWD; by Does not apply route          Subjective:      Patient ID: Peter Reece is a 53 y o  female      HPI     Pt presents by herself today for an acute visit   C/O B/l lower back pain which started about 5 days ago   Was sitting in the car last week for prolonged periods of time and then picked up a small child which she feels caused her lower back pain   Worse on her left lower back today   Somewhat better today compared to this past weekend   Radiates into her left buttock at times   Sitting makes the pain worse   Pain is described as achy and intermittent  No numbness or tingling down leg   No leg weakness  No saddle anesthesia  Denies bowel or bladder incontinence, no urinary retention    She does have stress UI but this is not new     Has tried Yoga posses which helps  Hot water in the shower helps   Icing the area also helps    Reports she had this same exact pain back in August of 2018 for which she was evaluated at [facility]) ED  Received Prednisone, NSAIDs and Lidocaine patches which did completely resolve her pain   Xray at that time of the SI joints with no acute changes but 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, left greater than the right     The following portions of the patient's history were reviewed and updated as appropriate: allergies, current medications, past medical history, past social history and problem list     Review of Systems   Constitutional: Negative for chills, fatigue and fever  Respiratory: Negative for cough, shortness of breath and wheezing  Cardiovascular: Negative for chest pain, palpitations and leg swelling  Gastrointestinal: Negative for abdominal pain, blood in stool, constipation, diarrhea and nausea  Genitourinary: Negative for decreased urine volume, difficulty urinating and dysuria  Musculoskeletal: Positive for back pain  Negative for arthralgias, gait problem and myalgias  Skin: Negative for color change, pallor, rash and wound  Neurological: Negative for dizziness, weakness, numbness and headaches  Hematological: Negative for adenopathy  Does not bruise/bleed easily  Objective:      /74 (BP Location: Left arm, Patient Position: Sitting, Cuff Size: Large)   Pulse 84   Temp 97 9 °F (36 6 °C) (Oral)   Resp 20   Ht 5' 5" (1 651 m)   Wt 123 kg (271 lb)   SpO2 97%   BMI 45 10 kg/m²          Physical Exam   Constitutional: She is oriented to person, place, and time  She appears well-developed and well-nourished  No distress  HENT:   Head: Normocephalic and atraumatic  Eyes: Pupils are equal, round, and reactive to light  Conjunctivae are normal    Neck: Normal range of motion  Neck supple  Cardiovascular: Normal rate, regular rhythm and normal heart sounds  No murmur heard    Pulmonary/Chest: Effort normal and breath sounds normal  No respiratory distress  She has no wheezes  Abdominal: Soft  Bowel sounds are normal  She exhibits no distension  There is no tenderness  Musculoskeletal: Normal range of motion  Arms:  Lymphadenopathy:     She has no cervical adenopathy  Neurological: She is alert and oriented to person, place, and time  Skin: Skin is warm and dry  She is not diaphoretic  Psychiatric: She has a normal mood and affect

## 2019-03-06 NOTE — ASSESSMENT & PLAN NOTE
No red flags on exam today, will hold off on any imaging for now  Likely exacerbated from prolonged car rides last week  We will treat with PO Prednisone taper, SE reviewed today  No NSAIDs while on this, Tylenol OTC is fine if needed (max of 3g/24 hours)  She has OTC Lidocaine patches at home and she may continue to use these  Moist heat to area, gentle stretching  Call office if symptoms worsen or persist

## 2019-03-21 DIAGNOSIS — J30.1 SEASONAL ALLERGIC RHINITIS DUE TO POLLEN: ICD-10-CM

## 2019-03-21 RX ORDER — FLUTICASONE PROPIONATE 50 MCG
2 SPRAY, SUSPENSION (ML) NASAL DAILY
Qty: 16 G | Refills: 3 | Status: SHIPPED | OUTPATIENT
Start: 2019-03-21 | End: 2020-03-24 | Stop reason: SDUPTHER

## 2019-03-21 NOTE — TELEPHONE ENCOUNTER
Patient is requesting a refill of fluticasone (FLONASE) 50 mcg/act nasal spray to [facility], [address] [facility], [place]  Patient can be contacted at [phone] with any questions

## 2019-04-05 DIAGNOSIS — I10 ESSENTIAL HYPERTENSION: ICD-10-CM

## 2019-04-05 RX ORDER — HYDROCHLOROTHIAZIDE 25 MG/1
TABLET ORAL
Qty: 90 TABLET | Refills: 0 | Status: SHIPPED | OUTPATIENT
Start: 2019-04-05 | End: 2019-07-04 | Stop reason: SDUPTHER

## 2019-07-04 DIAGNOSIS — I10 ESSENTIAL HYPERTENSION: ICD-10-CM

## 2019-07-04 RX ORDER — HYDROCHLOROTHIAZIDE 25 MG/1
TABLET ORAL
Qty: 90 TABLET | Refills: 0 | Status: SHIPPED | OUTPATIENT
Start: 2019-07-04 | End: 2019-10-02 | Stop reason: SDUPTHER

## 2019-07-28 DIAGNOSIS — I10 ESSENTIAL HYPERTENSION: ICD-10-CM

## 2019-07-28 RX ORDER — AMLODIPINE BESYLATE 5 MG/1
5 TABLET ORAL DAILY
Qty: 90 TABLET | Refills: 3 | Status: SHIPPED | OUTPATIENT
Start: 2019-07-28 | End: 2020-07-22

## 2019-07-28 RX ORDER — LOSARTAN POTASSIUM 50 MG/1
TABLET ORAL
Qty: 90 TABLET | Refills: 3 | Status: SHIPPED | OUTPATIENT
Start: 2019-07-28 | End: 2020-07-22

## 2019-10-02 DIAGNOSIS — I10 ESSENTIAL HYPERTENSION: ICD-10-CM

## 2019-10-02 RX ORDER — HYDROCHLOROTHIAZIDE 25 MG/1
TABLET ORAL
Qty: 90 TABLET | Refills: 4 | Status: SHIPPED | OUTPATIENT
Start: 2019-10-02 | End: 2021-04-30 | Stop reason: SDUPTHER

## 2020-03-24 DIAGNOSIS — J30.1 SEASONAL ALLERGIC RHINITIS DUE TO POLLEN: ICD-10-CM

## 2020-03-24 DIAGNOSIS — R11.0 NAUSEA: ICD-10-CM

## 2020-03-24 RX ORDER — ONDANSETRON 4 MG/1
TABLET, FILM COATED ORAL
Qty: 20 TABLET | Refills: 0 | Status: SHIPPED | OUTPATIENT
Start: 2020-03-24 | End: 2020-06-18 | Stop reason: SDUPTHER

## 2020-03-24 RX ORDER — FLUTICASONE PROPIONATE 50 MCG
2 SPRAY, SUSPENSION (ML) NASAL DAILY
Qty: 16 G | Refills: 3 | Status: SHIPPED | OUTPATIENT
Start: 2020-03-24

## 2020-03-24 NOTE — TELEPHONE ENCOUNTER
Please call patient  Pharmacy sent a request to refill Rx for Zofran and Futicasone nasal spray  Patient was seen last by Samra Hernandez in January 2019  Check if patient requested prescription refills

## 2020-06-18 ENCOUNTER — TELEMEDICINE (OUTPATIENT)
Dept: FAMILY MEDICINE CLINIC | Facility: CLINIC | Age: 50
End: 2020-06-18
Payer: COMMERCIAL

## 2020-06-18 VITALS
SYSTOLIC BLOOD PRESSURE: 121 MMHG | HEIGHT: 64 IN | BODY MASS INDEX: 45.75 KG/M2 | HEART RATE: 68 BPM | WEIGHT: 268 LBS | TEMPERATURE: 97.4 F | DIASTOLIC BLOOD PRESSURE: 67 MMHG

## 2020-06-18 DIAGNOSIS — Z78.9 VEGETARIAN: ICD-10-CM

## 2020-06-18 DIAGNOSIS — Z86.19 HISTORY OF HELICOBACTER PYLORI INFECTION: ICD-10-CM

## 2020-06-18 DIAGNOSIS — R53.83 FATIGUE, UNSPECIFIED TYPE: ICD-10-CM

## 2020-06-18 DIAGNOSIS — R73.01 IMPAIRED FASTING GLUCOSE: ICD-10-CM

## 2020-06-18 DIAGNOSIS — Z13.6 SCREENING FOR CARDIOVASCULAR CONDITION: ICD-10-CM

## 2020-06-18 DIAGNOSIS — R11.0 NAUSEA: Primary | ICD-10-CM

## 2020-06-18 DIAGNOSIS — Z20.828 EXPOSURE TO SARS-ASSOCIATED CORONAVIRUS: ICD-10-CM

## 2020-06-18 DIAGNOSIS — I10 ESSENTIAL HYPERTENSION: ICD-10-CM

## 2020-06-18 DIAGNOSIS — R42 DIZZINESS: ICD-10-CM

## 2020-06-18 PROCEDURE — U0003 INFECTIOUS AGENT DETECTION BY NUCLEIC ACID (DNA OR RNA); SEVERE ACUTE RESPIRATORY SYNDROME CORONAVIRUS 2 (SARS-COV-2) (CORONAVIRUS DISEASE [COVID-19]), AMPLIFIED PROBE TECHNIQUE, MAKING USE OF HIGH THROUGHPUT TECHNOLOGIES AS DESCRIBED BY CMS-2020-01-R: HCPCS | Performed by: OBSTETRICS & GYNECOLOGY

## 2020-06-18 PROCEDURE — 99443 PR PHYS/QHP TELEPHONE EVALUATION 21-30 MIN: CPT | Performed by: FAMILY MEDICINE

## 2020-06-18 RX ORDER — ONDANSETRON 4 MG/1
TABLET, FILM COATED ORAL
Qty: 30 TABLET | Refills: 1 | Status: SHIPPED | OUTPATIENT
Start: 2020-06-18 | End: 2022-03-14 | Stop reason: ALTCHOICE

## 2020-06-19 ENCOUNTER — TELEPHONE (OUTPATIENT)
Dept: GASTROENTEROLOGY | Facility: CLINIC | Age: 50
End: 2020-06-19

## 2020-06-19 LAB — SARS-COV-2 RNA SPEC QL NAA+PROBE: NOT DETECTED

## 2020-06-22 ENCOUNTER — TELEMEDICINE (OUTPATIENT)
Dept: GASTROENTEROLOGY | Facility: CLINIC | Age: 50
End: 2020-06-22
Payer: COMMERCIAL

## 2020-06-22 ENCOUNTER — TELEPHONE (OUTPATIENT)
Dept: FAMILY MEDICINE CLINIC | Facility: CLINIC | Age: 50
End: 2020-06-22

## 2020-06-22 VITALS — WEIGHT: 268 LBS | BODY MASS INDEX: 45.75 KG/M2 | HEIGHT: 64 IN

## 2020-06-22 DIAGNOSIS — Z86.19 HISTORY OF HELICOBACTER PYLORI INFECTION: ICD-10-CM

## 2020-06-22 DIAGNOSIS — R11.0 NAUSEA: ICD-10-CM

## 2020-06-22 PROCEDURE — 99241 PR OFFICE CONSULTATION NEW/ESTAB PATIENT 15 MIN: CPT | Performed by: INTERNAL MEDICINE

## 2020-06-22 PROCEDURE — 3008F BODY MASS INDEX DOCD: CPT | Performed by: INTERNAL MEDICINE

## 2020-06-22 NOTE — RESULT ENCOUNTER NOTE
I called patient and reviewed the results and instructions  She declined a follow up at this time, she spoke to GI and they will d o a scope on her in a month, and she will get the labs done in the meantime  She would like to follow up after  I did let her know to call us back sooner if symptoms worsen or change

## 2020-07-22 DIAGNOSIS — I10 ESSENTIAL HYPERTENSION: ICD-10-CM

## 2020-07-22 RX ORDER — LOSARTAN POTASSIUM 50 MG/1
TABLET ORAL
Qty: 90 TABLET | Refills: 3 | Status: SHIPPED | OUTPATIENT
Start: 2020-07-22 | End: 2021-04-30 | Stop reason: SDUPTHER

## 2020-07-22 RX ORDER — AMLODIPINE BESYLATE 5 MG/1
TABLET ORAL
Qty: 90 TABLET | Refills: 3 | Status: SHIPPED | OUTPATIENT
Start: 2020-07-22 | End: 2021-04-30 | Stop reason: SDUPTHER

## 2021-04-30 DIAGNOSIS — R11.0 NAUSEA: ICD-10-CM

## 2021-04-30 DIAGNOSIS — M46.1 SACROILIITIS (HCC): ICD-10-CM

## 2021-04-30 DIAGNOSIS — I10 ESSENTIAL HYPERTENSION: ICD-10-CM

## 2021-04-30 DIAGNOSIS — J30.1 SEASONAL ALLERGIC RHINITIS DUE TO POLLEN: ICD-10-CM

## 2021-04-30 RX ORDER — HYDROCHLOROTHIAZIDE 25 MG/1
25 TABLET ORAL DAILY
Qty: 90 TABLET | Refills: 3 | Status: SHIPPED | OUTPATIENT
Start: 2021-04-30 | End: 2022-03-21 | Stop reason: SDUPTHER

## 2021-04-30 RX ORDER — LOSARTAN POTASSIUM 50 MG/1
50 TABLET ORAL DAILY
Qty: 90 TABLET | Refills: 3 | Status: SHIPPED | OUTPATIENT
Start: 2021-04-30 | End: 2022-03-21 | Stop reason: SDUPTHER

## 2021-04-30 RX ORDER — AMLODIPINE BESYLATE 5 MG/1
5 TABLET ORAL DAILY
Qty: 90 TABLET | Refills: 3 | Status: SHIPPED | OUTPATIENT
Start: 2021-04-30 | End: 2022-03-21 | Stop reason: SDUPTHER

## 2021-10-13 ENCOUNTER — TELEPHONE (OUTPATIENT)
Dept: FAMILY MEDICINE CLINIC | Facility: CLINIC | Age: 51
End: 2021-10-13

## 2021-10-13 DIAGNOSIS — Z20.822 EXPOSURE TO COVID-19 VIRUS: Primary | ICD-10-CM

## 2021-10-14 PROCEDURE — U0005 INFEC AGEN DETEC AMPLI PROBE: HCPCS | Performed by: FAMILY MEDICINE

## 2021-10-14 PROCEDURE — U0003 INFECTIOUS AGENT DETECTION BY NUCLEIC ACID (DNA OR RNA); SEVERE ACUTE RESPIRATORY SYNDROME CORONAVIRUS 2 (SARS-COV-2) (CORONAVIRUS DISEASE [COVID-19]), AMPLIFIED PROBE TECHNIQUE, MAKING USE OF HIGH THROUGHPUT TECHNOLOGIES AS DESCRIBED BY CMS-2020-01-R: HCPCS | Performed by: FAMILY MEDICINE

## 2021-12-08 ENCOUNTER — TELEPHONE (OUTPATIENT)
Dept: FAMILY MEDICINE CLINIC | Facility: CLINIC | Age: 51
End: 2021-12-08

## 2021-12-08 DIAGNOSIS — B34.9 VIRAL INFECTION, UNSPECIFIED: Primary | ICD-10-CM

## 2021-12-08 PROCEDURE — U0003 INFECTIOUS AGENT DETECTION BY NUCLEIC ACID (DNA OR RNA); SEVERE ACUTE RESPIRATORY SYNDROME CORONAVIRUS 2 (SARS-COV-2) (CORONAVIRUS DISEASE [COVID-19]), AMPLIFIED PROBE TECHNIQUE, MAKING USE OF HIGH THROUGHPUT TECHNOLOGIES AS DESCRIBED BY CMS-2020-01-R: HCPCS | Performed by: FAMILY MEDICINE

## 2021-12-08 PROCEDURE — U0005 INFEC AGEN DETEC AMPLI PROBE: HCPCS | Performed by: FAMILY MEDICINE

## 2021-12-13 ENCOUNTER — TELEMEDICINE (OUTPATIENT)
Dept: FAMILY MEDICINE CLINIC | Facility: CLINIC | Age: 51
End: 2021-12-13
Payer: COMMERCIAL

## 2021-12-13 ENCOUNTER — HOSPITAL ENCOUNTER (EMERGENCY)
Facility: HOSPITAL | Age: 51
Discharge: HOME | End: 2021-12-13
Attending: EMERGENCY MEDICINE | Admitting: EMERGENCY MEDICINE
Payer: COMMERCIAL

## 2021-12-13 ENCOUNTER — APPOINTMENT (EMERGENCY)
Dept: RADIOLOGY | Facility: HOSPITAL | Age: 51
End: 2021-12-13
Payer: COMMERCIAL

## 2021-12-13 VITALS
HEART RATE: 81 BPM | DIASTOLIC BLOOD PRESSURE: 89 MMHG | OXYGEN SATURATION: 100 % | WEIGHT: 279 LBS | SYSTOLIC BLOOD PRESSURE: 129 MMHG | TEMPERATURE: 98.3 F | BODY MASS INDEX: 47.89 KG/M2 | RESPIRATION RATE: 18 BRPM

## 2021-12-13 DIAGNOSIS — R06.00 DYSPNEA ON EXERTION: ICD-10-CM

## 2021-12-13 DIAGNOSIS — R07.89 CHEST PRESSURE: ICD-10-CM

## 2021-12-13 DIAGNOSIS — R12 HEART BURN: ICD-10-CM

## 2021-12-13 DIAGNOSIS — R06.02 SOB (SHORTNESS OF BREATH) ON EXERTION: Primary | ICD-10-CM

## 2021-12-13 DIAGNOSIS — R07.9 CHEST PAIN, UNSPECIFIED: Primary | ICD-10-CM

## 2021-12-13 LAB
4HR DELTA HS TROPONIN: -1 NG/L
ALBUMIN SERPL BCP-MCNC: 4 G/DL (ref 3.5–5)
ALP SERPL-CCNC: 71 U/L (ref 46–116)
ALT SERPL W P-5'-P-CCNC: 50 U/L (ref 12–78)
ANION GAP SERPL CALCULATED.3IONS-SCNC: 6 MMOL/L (ref 4–13)
AST SERPL W P-5'-P-CCNC: 39 U/L (ref 5–45)
ATRIAL RATE: 79 BPM
BASOPHILS # BLD AUTO: 0.03 THOUSANDS/ΜL (ref 0–0.1)
BASOPHILS NFR BLD AUTO: 0 % (ref 0–1)
BILIRUB SERPL-MCNC: 0.63 MG/DL (ref 0.2–1)
BUN SERPL-MCNC: 10 MG/DL (ref 5–25)
CALCIUM SERPL-MCNC: 9.7 MG/DL (ref 8.3–10.1)
CARDIAC TROPONIN I PNL SERPL HS: 2 NG/L
CARDIAC TROPONIN I PNL SERPL HS: 3 NG/L
CHLORIDE SERPL-SCNC: 104 MMOL/L (ref 100–108)
CO2 SERPL-SCNC: 27 MMOL/L (ref 21–32)
CREAT SERPL-MCNC: 0.69 MG/DL (ref 0.6–1.3)
EOSINOPHIL # BLD AUTO: 0.11 THOUSAND/ΜL (ref 0–0.61)
EOSINOPHIL NFR BLD AUTO: 1 % (ref 0–6)
ERYTHROCYTE [DISTWIDTH] IN BLOOD BY AUTOMATED COUNT: 12.7 % (ref 11.6–15.1)
GFR SERPL CREATININE-BSD FRML MDRD: 117 ML/MIN/1.73SQ M
GLUCOSE SERPL-MCNC: 79 MG/DL (ref 65–140)
HCT VFR BLD AUTO: 37.5 % (ref 34.8–46.1)
HGB BLD-MCNC: 11.9 G/DL (ref 11.5–15.4)
IMM GRANULOCYTES # BLD AUTO: 0.02 THOUSAND/UL (ref 0–0.2)
IMM GRANULOCYTES NFR BLD AUTO: 0 % (ref 0–2)
LYMPHOCYTES # BLD AUTO: 3.18 THOUSANDS/ΜL (ref 0.6–4.47)
LYMPHOCYTES NFR BLD AUTO: 42 % (ref 14–44)
MCH RBC QN AUTO: 28.7 PG (ref 26.8–34.3)
MCHC RBC AUTO-ENTMCNC: 31.7 G/DL (ref 31.4–37.4)
MCV RBC AUTO: 90 FL (ref 82–98)
MONOCYTES # BLD AUTO: 0.53 THOUSAND/ΜL (ref 0.17–1.22)
MONOCYTES NFR BLD AUTO: 7 % (ref 4–12)
NEUTROPHILS # BLD AUTO: 3.76 THOUSANDS/ΜL (ref 1.85–7.62)
NEUTS SEG NFR BLD AUTO: 50 % (ref 43–75)
NRBC BLD AUTO-RTO: 0 /100 WBCS
P AXIS: 52 DEGREES
PLATELET # BLD AUTO: 263 THOUSANDS/UL (ref 149–390)
PMV BLD AUTO: 10 FL (ref 8.9–12.7)
POTASSIUM SERPL-SCNC: 3.7 MMOL/L (ref 3.5–5.3)
PR INTERVAL: 164 MS
PROT SERPL-MCNC: 8.4 G/DL (ref 6.4–8.2)
QRS AXIS: 21 DEGREES
QRSD INTERVAL: 78 MS
QT INTERVAL: 406 MS
QTC INTERVAL: 465 MS
RBC # BLD AUTO: 4.15 MILLION/UL (ref 3.81–5.12)
SODIUM SERPL-SCNC: 137 MMOL/L (ref 136–145)
T WAVE AXIS: 13 DEGREES
VENTRICULAR RATE: 79 BPM
WBC # BLD AUTO: 7.63 THOUSAND/UL (ref 4.31–10.16)

## 2021-12-13 PROCEDURE — 93010 ELECTROCARDIOGRAM REPORT: CPT | Performed by: INTERNAL MEDICINE

## 2021-12-13 PROCEDURE — 1036F TOBACCO NON-USER: CPT | Performed by: FAMILY MEDICINE

## 2021-12-13 PROCEDURE — 99285 EMERGENCY DEPT VISIT HI MDM: CPT

## 2021-12-13 PROCEDURE — 84484 ASSAY OF TROPONIN QUANT: CPT | Performed by: EMERGENCY MEDICINE

## 2021-12-13 PROCEDURE — 80053 COMPREHEN METABOLIC PANEL: CPT | Performed by: EMERGENCY MEDICINE

## 2021-12-13 PROCEDURE — 93005 ELECTROCARDIOGRAM TRACING: CPT

## 2021-12-13 PROCEDURE — 36415 COLL VENOUS BLD VENIPUNCTURE: CPT

## 2021-12-13 PROCEDURE — 99213 OFFICE O/P EST LOW 20 MIN: CPT | Performed by: FAMILY MEDICINE

## 2021-12-13 PROCEDURE — 93308 TTE F-UP OR LMTD: CPT | Performed by: EMERGENCY MEDICINE

## 2021-12-13 PROCEDURE — 93321 DOPPLER ECHO F-UP/LMTD STD: CPT | Performed by: EMERGENCY MEDICINE

## 2021-12-13 PROCEDURE — 85025 COMPLETE CBC W/AUTO DIFF WBC: CPT | Performed by: EMERGENCY MEDICINE

## 2021-12-13 PROCEDURE — 71046 X-RAY EXAM CHEST 2 VIEWS: CPT

## 2021-12-13 PROCEDURE — 99285 EMERGENCY DEPT VISIT HI MDM: CPT | Performed by: EMERGENCY MEDICINE

## 2021-12-13 RX ORDER — MAGNESIUM HYDROXIDE/ALUMINUM HYDROXICE/SIMETHICONE 120; 1200; 1200 MG/30ML; MG/30ML; MG/30ML
30 SUSPENSION ORAL ONCE
Status: COMPLETED | OUTPATIENT
Start: 2021-12-13 | End: 2021-12-13

## 2021-12-13 RX ORDER — PANTOPRAZOLE SODIUM 20 MG/1
20 TABLET, DELAYED RELEASE ORAL DAILY
Qty: 20 TABLET | Refills: 0 | Status: SHIPPED | OUTPATIENT
Start: 2021-12-13 | End: 2022-02-04 | Stop reason: SDUPTHER

## 2021-12-13 RX ADMIN — ALUMINA, MAGNESIA, AND SIMETHICONE ORAL SUSPENSION REGULAR STRENGTH 30 ML: 1200; 1200; 120 SUSPENSION ORAL at 18:13

## 2022-02-04 DIAGNOSIS — R07.9 CHEST PAIN, UNSPECIFIED: ICD-10-CM

## 2022-02-04 RX ORDER — PANTOPRAZOLE SODIUM 20 MG/1
TABLET, DELAYED RELEASE ORAL
Qty: 60 TABLET | Refills: 3 | Status: SHIPPED | OUTPATIENT
Start: 2022-02-04 | End: 2022-03-14 | Stop reason: ALTCHOICE

## 2022-02-04 NOTE — TELEPHONE ENCOUNTER
Requested medication(s) are due for refill today: Yes  Patient has already received a courtesy refill: No  Other reason request has been forwarded to provider: Renewal, reviewed last visit which was ED on 12/13/21 who also prescribed med last and it was filled for 20 mg and pt is taking 40 mg, please advise

## 2022-03-05 ENCOUNTER — TELEPHONE (OUTPATIENT)
Dept: OTHER | Facility: OTHER | Age: 52
End: 2022-03-05

## 2022-03-06 NOTE — TELEPHONE ENCOUNTER
Patient called and canceled her appointment because of a family emergency and will call back the office to reschedule her appointment

## 2022-03-14 ENCOUNTER — OFFICE VISIT (OUTPATIENT)
Dept: FAMILY MEDICINE CLINIC | Facility: CLINIC | Age: 52
End: 2022-03-14
Payer: COMMERCIAL

## 2022-03-14 VITALS
DIASTOLIC BLOOD PRESSURE: 98 MMHG | HEART RATE: 80 BPM | SYSTOLIC BLOOD PRESSURE: 128 MMHG | RESPIRATION RATE: 16 BRPM | TEMPERATURE: 98.1 F | BODY MASS INDEX: 50.02 KG/M2 | HEIGHT: 64 IN | WEIGHT: 293 LBS

## 2022-03-14 DIAGNOSIS — K21.9 GASTROESOPHAGEAL REFLUX DISEASE WITHOUT ESOPHAGITIS: Primary | ICD-10-CM

## 2022-03-14 DIAGNOSIS — E66.01 CLASS 3 SEVERE OBESITY DUE TO EXCESS CALORIES WITHOUT SERIOUS COMORBIDITY WITH BODY MASS INDEX (BMI) OF 50.0 TO 59.9 IN ADULT (HCC): ICD-10-CM

## 2022-03-14 PROCEDURE — 3008F BODY MASS INDEX DOCD: CPT | Performed by: FAMILY MEDICINE

## 2022-03-14 PROCEDURE — 99214 OFFICE O/P EST MOD 30 MIN: CPT | Performed by: FAMILY MEDICINE

## 2022-03-14 RX ORDER — RABEPRAZOLE SODIUM 20 MG/1
20 TABLET, DELAYED RELEASE ORAL DAILY
Qty: 90 TABLET | Refills: 1 | Status: SHIPPED | OUTPATIENT
Start: 2022-03-14 | End: 2022-05-05 | Stop reason: ALTCHOICE

## 2022-03-14 NOTE — ASSESSMENT & PLAN NOTE
Discussed with patient that symptoms are most likely consistent with gastric reflux disease  ED note, labs and imaging all reviewed  EKG normal  Patient does have history of H pylori previously  I would recommend continue with PPI possibly increasing dose  Patient would like to switch to different PPI  Agreeable to this  Was switched to rabeprazole 20 mg daily  Advised patient to take medication daily, would recheck in 1 month, if getting worse, would consider increasing dose  Also consider referring to GI for endoscopy a for any worsening symptoms      Follow-up in month

## 2022-03-14 NOTE — PROGRESS NOTES
Assessment/Plan:    Gastroesophageal reflux disease without esophagitis  Discussed with patient that symptoms are most likely consistent with gastric reflux disease  ED note, labs and imaging all reviewed  EKG normal  Patient does have history of H pylori previously  I would recommend continue with PPI possibly increasing dose  Patient would like to switch to different PPI  Agreeable to this  Was switched to rabeprazole 20 mg daily  Advised patient to take medication daily, would recheck in 1 month, if getting worse, would consider increasing dose  Also consider referring to GI for endoscopy a for any worsening symptoms  Follow-up in month    Class 3 severe obesity due to excess calories without serious comorbidity with body mass index (BMI) of 50 0 to 59 9 in [facility])  Discussed lifestyle changes including diet and exercise  Diet should include switching from simple carbohydrates like white rice, white pasta, white bread to brown rice, wheat pasta, wheat bread  Increase exercise to 150 minutes per week, should include cardio and weightlifting  Offered nutritional counseling when patient is ready    30 minutes spent with patient  Diagnoses and all orders for this visit:    Gastroesophageal reflux disease without esophagitis  -     RABEprazole (ACIPHEX) 20 MG tablet;  Take 1 tablet (20 mg total) by mouth daily    Class 3 severe obesity due to excess calories without serious comorbidity with body mass index (BMI) of 50 0 to 59 9 in [facility])          Subjective:   Chief Complaint   Patient presents with    Follow-up     routine follow up      Health Maintenance   Topic Date Due    Hepatitis C Screening  Never done    HIV Screening  Never done    Annual Physical  Never done    DTaP,Tdap,and Td Vaccines (1 - Tdap) Never done    Cervical Cancer Screening  Never done    BMI: Followup Plan  06/18/2021    COVID-19 Vaccine (3 - Booster for Moderna series) 07/10/2021    Colorectal Cancer Screening  Never done    Influenza Vaccine (1) 09/01/2021    Depression Screening  03/14/2023    BMI: Adult  03/14/2023    Pneumococcal Vaccine: Pediatrics (0 to 5 Years) and At-Risk Patients (6 to 59 Years)  Aged Out    HIB Vaccine  Aged Out    Hepatitis B Vaccine  Aged Out    IPV Vaccine  Aged Out    Hepatitis A Vaccine  Aged Out    Meningococcal ACWY Vaccine  Aged Out    HPV Vaccine  Aged Out        Patient ID: Sj Doran is a 49 y o  female  HPI    Follow up from ED for Chest pain from December  Thought to be due to GERD  Started on Protonix 20 mg daily which does appear to have helped with the reflux  Patient would like to be switched to a different PPI  A states that she has been more constant and avoiding certain foods triggering reflux  Reflux has improved but continues to get these daily  Has been avoiding onions, garlic, and tomatoes  Continues to eat some spicy foods and knows that this is bad for her  Denies any fevers, chills, nausea, vomiting, abdominal discomfort, cough, chest pain  Patient reports having gained additional weight since last visit although this appears to be improving now  Patient has been losing weight given new diet  The following portions of the patient's history were reviewed and updated as appropriate: allergies, current medications, past family history, past medical history, past social history, past surgical history, and problem list     Review of Systems   Constitutional: Negative for chills and fever  HENT: Negative for congestion  Respiratory: Negative for cough and shortness of breath  Cardiovascular: Negative for chest pain and palpitations  Gastrointestinal: Positive for abdominal pain (Epigastric discomfort/upper chest discomfort)  Negative for abdominal distention, nausea and vomiting  Genitourinary: Negative for dysuria  Musculoskeletal: Negative for myalgias  Skin: Negative for rash     Neurological: Negative for dizziness and headaches  Objective:  /98   Pulse 80   Temp 98 1 °F (36 7 °C) (Tympanic)   Resp 16   Ht 5' 4" (1 626 m)   Wt 135 kg (298 lb)   BMI 51 15 kg/m²      Physical Exam  Vitals and nursing note reviewed  Constitutional:       General: She is not in acute distress  Appearance: She is obese  HENT:      Head: Normocephalic and atraumatic  Right Ear: Tympanic membrane normal       Left Ear: Tympanic membrane normal    Eyes:      Conjunctiva/sclera: Conjunctivae normal    Cardiovascular:      Rate and Rhythm: Normal rate and regular rhythm  Pulses: Normal pulses  Heart sounds: No murmur heard  Pulmonary:      Effort: Pulmonary effort is normal  No respiratory distress  Breath sounds: No wheezing, rhonchi or rales  Abdominal:      General: There is no distension  Palpations: Abdomen is soft  Tenderness: There is no abdominal tenderness  Musculoskeletal:         General: No swelling  Neurological:      Mental Status: She is alert  This note has been constructed using a voice recognition system  There may be translation, syntax, or grammatical errors  If you have an questions, please contact the dictating provider

## 2022-03-14 NOTE — ASSESSMENT & PLAN NOTE
Discussed lifestyle changes including diet and exercise  Diet should include switching from simple carbohydrates like white rice, white pasta, white bread to brown rice, wheat pasta, wheat bread  Increase exercise to 150 minutes per week, should include cardio and weightlifting    Offered nutritional counseling when patient is ready

## 2022-03-21 ENCOUNTER — TELEMEDICINE (OUTPATIENT)
Dept: FAMILY MEDICINE CLINIC | Facility: CLINIC | Age: 52
End: 2022-03-21
Payer: COMMERCIAL

## 2022-03-21 DIAGNOSIS — I10 ESSENTIAL HYPERTENSION: ICD-10-CM

## 2022-03-21 DIAGNOSIS — J40 BRONCHITIS: Primary | ICD-10-CM

## 2022-03-21 PROCEDURE — 99212 OFFICE O/P EST SF 10 MIN: CPT | Performed by: FAMILY MEDICINE

## 2022-03-21 PROCEDURE — 1036F TOBACCO NON-USER: CPT | Performed by: FAMILY MEDICINE

## 2022-03-21 RX ORDER — AMLODIPINE BESYLATE 5 MG/1
5 TABLET ORAL DAILY
Qty: 90 TABLET | Refills: 3 | Status: SHIPPED | OUTPATIENT
Start: 2022-03-21 | End: 2022-07-25

## 2022-03-21 RX ORDER — PREDNISONE 20 MG/1
40 TABLET ORAL DAILY
Qty: 3 TABLET | Refills: 0 | Status: SHIPPED | OUTPATIENT
Start: 2022-03-21 | End: 2022-03-21

## 2022-03-21 RX ORDER — ALBUTEROL SULFATE 90 UG/1
2 AEROSOL, METERED RESPIRATORY (INHALATION) EVERY 6 HOURS PRN
Qty: 8 G | Refills: 2 | Status: SHIPPED | OUTPATIENT
Start: 2022-03-21

## 2022-03-21 RX ORDER — PREDNISONE 20 MG/1
40 TABLET ORAL DAILY
Qty: 6 TABLET | Refills: 0 | Status: SHIPPED | OUTPATIENT
Start: 2022-03-21 | End: 2022-03-24

## 2022-03-21 RX ORDER — LOSARTAN POTASSIUM 50 MG/1
50 TABLET ORAL DAILY
Qty: 90 TABLET | Refills: 3 | Status: SHIPPED | OUTPATIENT
Start: 2022-03-21 | End: 2022-07-25

## 2022-03-21 RX ORDER — HYDROCHLOROTHIAZIDE 25 MG/1
25 TABLET ORAL DAILY
Qty: 90 TABLET | Refills: 3 | Status: SHIPPED | OUTPATIENT
Start: 2022-03-21 | End: 2022-07-25

## 2022-03-21 NOTE — ASSESSMENT & PLAN NOTE
Will prescribe patient albuterol inhaler to help with coughing  Advised patient to take 2 puffs every 6 hours as needed  Also start patient on prednisone 40 mg daily for the next 3 days  Patient does have history of acid reflux and takes PPI for this  Recommend that patient continue taking PPI while on prednisone as this will worsen any sore reflux  Follow-up in office

## 2022-03-21 NOTE — PROGRESS NOTES
Virtual Brief Visit    Patient is located in the following state in which I hold an active license [place]    Called and spoke with patient  Patient having a bronchitis/productive cough since Thursday  States that this occurs every year around this time  No fever  Just a lot of congestion  Has taken OTC cough medications which has helped minimally  Also doing cough drops as needed  Feels quite winded  Asking for inhaler that she used in the past which seems to help with this  Denies any other sick symptoms  No chest pain, palpitation, nausea, vomiting, diarrhea or body aches  Assessment/Plan:    Problem List Items Addressed This Visit        Respiratory    Bronchitis - Primary     Will prescribe patient albuterol inhaler to help with coughing  Advised patient to take 2 puffs every 6 hours as needed  Also start patient on prednisone 40 mg daily for the next 3 days  Patient does have history of acid reflux and takes PPI for this  Recommend that patient continue taking PPI while on prednisone as this will worsen any sore reflux  Follow-up in office  Relevant Medications    albuterol (Ventolin HFA) 90 mcg/act inhaler    predniSONE 20 mg tablet          Recent Visits  Date Type Provider Dept   03/14/22 Office Visit vijay Coleman, [facility] recent visits within past 7 days and meeting all other requirements  Today's Visits  Date Type Provider Dept   03/21/22 Telemedicine [facility], [facility] today's visits and meeting all other requirements  Future Appointments  No visits were found meeting these conditions    Showing future appointments within next 150 days and meeting all other requirements         I spent 15 minutes directly with the patient during this visit

## 2022-03-28 ENCOUNTER — HOSPITAL ENCOUNTER (EMERGENCY)
Facility: HOSPITAL | Age: 52
Discharge: HOME | End: 2022-03-28
Attending: EMERGENCY MEDICINE
Payer: COMMERCIAL

## 2022-03-28 ENCOUNTER — APPOINTMENT (EMERGENCY)
Dept: RADIOLOGY | Facility: HOSPITAL | Age: 52
End: 2022-03-28
Payer: COMMERCIAL

## 2022-03-28 ENCOUNTER — OFFICE VISIT (OUTPATIENT)
Dept: URGENT CARE | Age: 52
End: 2022-03-28
Payer: COMMERCIAL

## 2022-03-28 VITALS
BODY MASS INDEX: 48.49 KG/M2 | HEIGHT: 64 IN | OXYGEN SATURATION: 99 % | TEMPERATURE: 98.5 F | RESPIRATION RATE: 28 BRPM | WEIGHT: 284 LBS | HEART RATE: 78 BPM | SYSTOLIC BLOOD PRESSURE: 119 MMHG | DIASTOLIC BLOOD PRESSURE: 73 MMHG

## 2022-03-28 VITALS
HEART RATE: 74 BPM | TEMPERATURE: 97.8 F | OXYGEN SATURATION: 100 % | RESPIRATION RATE: 15 BRPM | SYSTOLIC BLOOD PRESSURE: 114 MMHG | DIASTOLIC BLOOD PRESSURE: 71 MMHG

## 2022-03-28 DIAGNOSIS — R10.9 ABDOMINAL PAIN: ICD-10-CM

## 2022-03-28 DIAGNOSIS — R07.9 CHEST PAIN: Primary | ICD-10-CM

## 2022-03-28 DIAGNOSIS — R06.02 SOB (SHORTNESS OF BREATH): Primary | ICD-10-CM

## 2022-03-28 DIAGNOSIS — R79.89 ELEVATED LFTS: ICD-10-CM

## 2022-03-28 LAB
ALBUMIN SERPL BCP-MCNC: 4.4 G/DL (ref 3.5–5)
ALP SERPL-CCNC: 84 U/L (ref 46–116)
ALT SERPL W P-5'-P-CCNC: 115 U/L (ref 12–78)
ANION GAP SERPL CALCULATED.3IONS-SCNC: 5 MMOL/L (ref 4–13)
AST SERPL W P-5'-P-CCNC: 77 U/L (ref 5–45)
ATRIAL RATE: 78 BPM
ATRIAL RATE: 81 BPM
BASOPHILS # BLD AUTO: 0.02 THOUSANDS/ΜL (ref 0–0.1)
BASOPHILS NFR BLD AUTO: 0 % (ref 0–1)
BILIRUB SERPL-MCNC: 0.72 MG/DL (ref 0.2–1)
BUN SERPL-MCNC: 12 MG/DL (ref 5–25)
CALCIUM SERPL-MCNC: 10 MG/DL (ref 8.3–10.1)
CARDIAC TROPONIN I PNL SERPL HS: 4 NG/L
CHLORIDE SERPL-SCNC: 100 MMOL/L (ref 100–108)
CO2 SERPL-SCNC: 30 MMOL/L (ref 21–32)
CREAT SERPL-MCNC: 0.83 MG/DL (ref 0.6–1.3)
EOSINOPHIL # BLD AUTO: 0.07 THOUSAND/ΜL (ref 0–0.61)
EOSINOPHIL NFR BLD AUTO: 1 % (ref 0–6)
ERYTHROCYTE [DISTWIDTH] IN BLOOD BY AUTOMATED COUNT: 12.7 % (ref 11.6–15.1)
GFR SERPL CREATININE-BSD FRML MDRD: 82 ML/MIN/1.73SQ M
GLUCOSE SERPL-MCNC: 123 MG/DL (ref 65–140)
HCT VFR BLD AUTO: 41.7 % (ref 34.8–46.1)
HGB BLD-MCNC: 13.2 G/DL (ref 11.5–15.4)
IMM GRANULOCYTES # BLD AUTO: 0.02 THOUSAND/UL (ref 0–0.2)
IMM GRANULOCYTES NFR BLD AUTO: 0 % (ref 0–2)
LIPASE SERPL-CCNC: 144 U/L (ref 73–393)
LYMPHOCYTES # BLD AUTO: 2.53 THOUSANDS/ΜL (ref 0.6–4.47)
LYMPHOCYTES NFR BLD AUTO: 42 % (ref 14–44)
MCH RBC QN AUTO: 27.8 PG (ref 26.8–34.3)
MCHC RBC AUTO-ENTMCNC: 31.7 G/DL (ref 31.4–37.4)
MCV RBC AUTO: 88 FL (ref 82–98)
MONOCYTES # BLD AUTO: 0.38 THOUSAND/ΜL (ref 0.17–1.22)
MONOCYTES NFR BLD AUTO: 6 % (ref 4–12)
NEUTROPHILS # BLD AUTO: 3.02 THOUSANDS/ΜL (ref 1.85–7.62)
NEUTS SEG NFR BLD AUTO: 51 % (ref 43–75)
NRBC BLD AUTO-RTO: 0 /100 WBCS
P AXIS: 44 DEGREES
P AXIS: 66 DEGREES
PLATELET # BLD AUTO: 317 THOUSANDS/UL (ref 149–390)
PMV BLD AUTO: 9.8 FL (ref 8.9–12.7)
POTASSIUM SERPL-SCNC: 3.5 MMOL/L (ref 3.5–5.3)
PR INTERVAL: 164 MS
PR INTERVAL: 166 MS
PROT SERPL-MCNC: 8.7 G/DL (ref 6.4–8.2)
QRS AXIS: 13 DEGREES
QRS AXIS: 13 DEGREES
QRSD INTERVAL: 82 MS
QRSD INTERVAL: 86 MS
QT INTERVAL: 368 MS
QT INTERVAL: 378 MS
QTC INTERVAL: 419 MS
QTC INTERVAL: 439 MS
RBC # BLD AUTO: 4.74 MILLION/UL (ref 3.81–5.12)
SODIUM SERPL-SCNC: 135 MMOL/L (ref 136–145)
T WAVE AXIS: 33 DEGREES
T WAVE AXIS: 45 DEGREES
VENTRICULAR RATE: 78 BPM
VENTRICULAR RATE: 81 BPM
WBC # BLD AUTO: 6.04 THOUSAND/UL (ref 4.31–10.16)

## 2022-03-28 PROCEDURE — 80053 COMPREHEN METABOLIC PANEL: CPT | Performed by: EMERGENCY MEDICINE

## 2022-03-28 PROCEDURE — 85025 COMPLETE CBC W/AUTO DIFF WBC: CPT | Performed by: EMERGENCY MEDICINE

## 2022-03-28 PROCEDURE — 83690 ASSAY OF LIPASE: CPT | Performed by: EMERGENCY MEDICINE

## 2022-03-28 PROCEDURE — 93010 ELECTROCARDIOGRAM REPORT: CPT | Performed by: INTERNAL MEDICINE

## 2022-03-28 PROCEDURE — 99285 EMERGENCY DEPT VISIT HI MDM: CPT | Performed by: EMERGENCY MEDICINE

## 2022-03-28 PROCEDURE — 99285 EMERGENCY DEPT VISIT HI MDM: CPT

## 2022-03-28 PROCEDURE — 36415 COLL VENOUS BLD VENIPUNCTURE: CPT

## 2022-03-28 PROCEDURE — 71045 X-RAY EXAM CHEST 1 VIEW: CPT

## 2022-03-28 PROCEDURE — 93005 ELECTROCARDIOGRAM TRACING: CPT

## 2022-03-28 PROCEDURE — 93005 ELECTROCARDIOGRAM TRACING: CPT | Performed by: STUDENT IN AN ORGANIZED HEALTH CARE EDUCATION/TRAINING PROGRAM

## 2022-03-28 PROCEDURE — 84484 ASSAY OF TROPONIN QUANT: CPT | Performed by: EMERGENCY MEDICINE

## 2022-03-28 PROCEDURE — 99213 OFFICE O/P EST LOW 20 MIN: CPT | Performed by: STUDENT IN AN ORGANIZED HEALTH CARE EDUCATION/TRAINING PROGRAM

## 2022-03-28 RX ORDER — FAMOTIDINE 20 MG/1
20 TABLET, FILM COATED ORAL ONCE
Status: COMPLETED | OUTPATIENT
Start: 2022-03-28 | End: 2022-03-28

## 2022-03-28 RX ORDER — FAMOTIDINE 20 MG/1
20 TABLET, FILM COATED ORAL 2 TIMES DAILY
Qty: 30 TABLET | Refills: 0 | Status: SHIPPED | OUTPATIENT
Start: 2022-03-28 | End: 2022-05-05 | Stop reason: ALTCHOICE

## 2022-03-28 RX ORDER — PANTOPRAZOLE SODIUM 40 MG/1
40 GRANULE, DELAYED RELEASE ORAL
COMMUNITY
End: 2022-05-05 | Stop reason: CLARIF

## 2022-03-28 RX ORDER — MAGNESIUM HYDROXIDE/ALUMINUM HYDROXICE/SIMETHICONE 120; 1200; 1200 MG/30ML; MG/30ML; MG/30ML
30 SUSPENSION ORAL ONCE
Status: COMPLETED | OUTPATIENT
Start: 2022-03-28 | End: 2022-03-28

## 2022-03-28 RX ADMIN — FAMOTIDINE 20 MG: 20 TABLET ORAL at 10:24

## 2022-03-28 RX ADMIN — ALUMINA, MAGNESIA, AND SIMETHICONE ORAL SUSPENSION REGULAR STRENGTH 30 ML: 1200; 1200; 120 SUSPENSION ORAL at 10:24

## 2022-03-28 NOTE — ED ATTENDING ATTESTATION
Final Diagnosis:  1  Chest pain    2  Abdominal pain    3  Elevated LFTs      ED Course as of 03/29/22 1439   Mon Mar 28, 2022   1004 WBC: 6 04   1004 HCT: 41 7   1004 Hemoglobin: 13 2   1004 Platelet Count: 439       I, Jose Luis Olivier MD, saw and evaluated the patient  All available labs and X-rays were ordered by me or the resident and have been reviewed by myself  I discussed the patient with the resident / non-physician and agree with the resident's / non-physician practitioner's findings and plan as documented in the resident's / non-physician practicitioner's note, except where noted  At this point, I agree with the current assessment done in the ED  I was present during key portions of all procedures performed unless otherwise stated  Chief Complaint   Patient presents with    Shortness of Breath     Pt  reportsing chest tightness increased since Tuesday  Reports stomach bloating  Hx of acid reflux but states it feels differently  +sob, reports new prescription for prednisone  This is a 49 y o  female presenting for evaluation of chest tightness, RIGHT  Hx of GERD  Supposed to be on protonix but is expensive  She developed a cough on Monday, along with her   Feels hard to breath  It's in the retrosternal region  Felt some lower belly pain at some point  The discomfort starts at night  Felt up at night for a couple nights  Yesterday, 6 PM, had a pressure / band like sensation on hte abdomen with a bloating sensation  Comes/goes  Went to [facility], got Mylanta  Tried gas-ex  Nothing helps her symptoms  She has tried removing some foods like onions, tomates, but nothing helps  +nuasea (chronically, even before this)  No sweating that's new (chronic night sweats while on CPAP)  Has the pressure on abdomen currently  The midsternal chest pressure is there all night into this morning and cotninues to now   Maybe about 6:30 - 7:30 AM had relief, but was continuous otherwise  Denies any urinary tract infection symptoms (burning, itching, pain, blood, frequency)  Denies any upper respiratory tract infection symptoms (cough, congestion, rhinorrhea, sore throat)  No belly surgeries  Hx of H pylori  [place] different, had vomiting back then  PMH:   has a past medical history of Anemia, Concussion, Eczema, Hypertension, Reactive airway disease, and Sleep apnea  PSH:   has a past surgical history that includes Tooth extraction and Upper gastrointestinal endoscopy  Social:  Social History     Substance and Sexual Activity   Alcohol Use No    Comment: Per [employer]: socially     Social History     Tobacco Use   Smoking Status Never Smoker   Smokeless Tobacco Never Used     Social History     Substance and Sexual Activity   Drug Use No     PE:  Vitals:    03/28/22 0937 03/28/22 0938 03/28/22 0941 03/28/22 1000   BP:  132/82  114/71   BP Location:  Right arm  Right arm   Pulse: 102   74   Resp: 18 18  15   Temp:   97 8 °F (36 6 °C)    TempSrc:   Oral    SpO2:  100%  100%   General: VSS, NAD, awake, alert  Well-nourished, well-developed  Appears stated age  Head: Normocephalic, atraumatic, nontender  Eyes: PERRL, EOM-I  No diplopia  No hyphema  No subconjunctival hemorrhages  Symmetrical lids  ENTAtraumatic external nose and ears  MMM  No stridor  Normal phonation  No drooling  Base of mouth is soft  No mastoid tenderness  Neck: Symmetric, trachea midline  No JVD  CV: Peripheral pulses +2 throughout  No chest wall tenderness  Lungs:   Unlabored   No retractions  No crepitus  No tachypnea  No paradoxical motion  Abd: +BS, soft, mild epigastric tenderness  ND    MSK:   FROM   No lower extremity edema  Back:   No CVAT  Skin: Dry, intact  Neuro: AAOx3, GCS 15, CN II-XII grossly intact  Motor grossly intact    Psychiatric/Behavioral: Appropriate mood and affect   Exam: deferred    A:  - Belly pain  - CP  P:  - Cardiac workup  - belly labs  - dispo    - 13 point ROS was performed and all are normal unless stated in the history above  - Nursing note reviewed  Vitals reviewed  - Orders placed by myself and/or advanced practitioner / resident     - Previous chart was reviewed  - No language barrier    - History obtained from patient  - There are no limitations to the history obtained  - Critical care time: Not applicable for this patient  Code Status: No Order  Advance Directive and Living Will:      Power of :    POLST:      Medications   aluminum-magnesium hydroxide-simethicone (MYLANTA) oral suspension 30 mL (30 mL Oral Given 3/28/22 1024)   famotidine (PEPCID) tablet 20 mg (20 mg Oral Given 3/28/22 1024)     XR chest 1 view portable   Final Result      No acute cardiopulmonary disease  Findings are stable            Workstation performed: [id number]           Orders Placed This Encounter   Procedures    XR chest 1 view portable    CBC and differential    Comprehensive metabolic panel    HS Troponin 0hr (reflex protocol)    Lipase    Ambulatory Referral to Gastroenterology    Continuous cardiac monitoring    Continuous pulse oximetry    ECG 12 lead    ECG 12 lead     Labs Reviewed   COMPREHENSIVE METABOLIC PANEL - Abnormal       Result Value Ref Range Status    Sodium 135 (*) 136 - 145 mmol/L Final    Potassium 3 5  3 5 - 5 3 mmol/L Final    Chloride 100  100 - 108 mmol/L Final    CO2 30  21 - 32 mmol/L Final    ANION GAP 5  4 - 13 mmol/L Final    BUN 12  5 - 25 mg/dL Final    Creatinine 0 83  0 60 - 1 30 mg/dL Final    Comment: Standardized to IDMS reference method    Glucose 123  65 - 140 mg/dL Final    Comment: If the patient is fasting, the ADA then defines impaired fasting glucose as > 100 mg/dL and diabetes as > or equal to 123 mg/dL  Specimen collection should occur prior to Sulfasalazine administration due to the potential for falsely depressed results   Specimen collection should occur prior to Sulfapyridine administration due to the potential for falsely elevated results  Calcium 10 0  8 3 - 10 1 mg/dL Final    AST 77 (*) 5 - 45 U/L Final    Comment: Specimen collection should occur prior to Sulfasalazine administration due to the potential for falsely depressed results   (*) 12 - 78 U/L Final    Comment: Specimen collection should occur prior to Sulfasalazine and/or Sulfapyridine administration due to the potential for falsely depressed results  Alkaline Phosphatase 84  46 - 116 U/L Final    Total Protein 8 7 (*) 6 4 - 8 2 g/dL Final    Albumin 4 4  3 5 - 5 0 g/dL Final    Total Bilirubin 0 72  0 20 - 1 00 mg/dL Final    Comment: Use of this assay is not recommended for patients undergoing treatment with eltrombopag due to the potential for falsely elevated results  eGFR 82  ml/min/1 73sq m Final    Narrative:     [place] guidelines for Chronic Kidney Disease (CKD):     Stage 1 with normal or high GFR (GFR > 90 mL/min/1 73 square meters)    Stage 2 Mild CKD (GFR = 60-89 mL/min/1 73 square meters)    Stage 3A Moderate CKD (GFR = 45-59 mL/min/1 73 square meters)    Stage 3B Moderate CKD (GFR = 30-44 mL/min/1 73 square meters)    Stage 4 Severe CKD (GFR = 15-29 mL/min/1 73 square meters)    Stage 5 End Stage CKD (GFR <15 mL/min/1 73 square meters)  Note: GFR calculation is accurate only with a steady state creatinine   HS TROPONIN I 0HR - Normal    hs TnI 0hr 4  "Refer to ACS Flowchart"- see link ng/L Final    Comment:                                              Initial (time 0) result  If >=50 ng/L, Myocardial injury suggested ;  Type of myocardial injury and treatment strategy  to be determined  If 5-49 ng/L, a delta result at 2 hours and or 4 hours will be needed to further evaluate  If <4 ng/L, and chest pain has been >3 hours since onset, patient may qualify for discharge based on the HEART score in the ED    If <5 ng/L and <3hours since onset of chest pain, a delta result at 2 hours will be needed to further evaluate  HS Troponin 99th Percentile URL of a Health Population=12 ng/L with a 95% Confidence Interval of 8-18 ng/L  Second Troponin (time 2 hours)  If calculated delta >= 20 ng/L,  Myocardial injury suggested ; Type of myocardial injury and treatment strategy to be determined  If 5-49 ng/L and the calculated delta is 5-19 ng/L, consult medical service for evaluation  Continue evaluation for ischemia on ecg and other possible etiology and repeat hs troponin at 4 hours  If delta is <5 ng/L at 2 hours, consider discharge based on risk stratification via the HEART score (if in ED), or [place] risk score in IP/Observation  HS Troponin 99th Percentile URL of a Health Population=12 ng/L with a 95% Confidence Interval of 8-18 ng/L     LIPASE - Normal    Lipase 144  73 - 393 u/L Final   CBC AND DIFFERENTIAL    WBC 6 04  4 31 - 10 16 Thousand/uL Final    RBC 4 74  3 81 - 5 12 Million/uL Final    Hemoglobin 13 2  11 5 - 15 4 g/dL Final    Hematocrit 41 7  34 8 - 46 1 % Final    MCV 88  82 - 98 fL Final    MCH 27 8  26 8 - 34 3 pg Final    MCHC 31 7  31 4 - 37 4 g/dL Final    RDW 12 7  11 6 - 15 1 % Final    MPV 9 8  8 9 - 12 7 fL Final    Platelets 861  681 - 390 Thousands/uL Final    nRBC 0  /100 WBCs Final    Neutrophils Relative 51  43 - 75 % Final    Immat GRANS % 0  0 - 2 % Final    Lymphocytes Relative 42  14 - 44 % Final    Monocytes Relative 6  4 - 12 % Final    Eosinophils Relative 1  0 - 6 % Final    Basophils Relative 0  0 - 1 % Final    Neutrophils Absolute 3 02  1 85 - 7 62 Thousands/µL Final    Immature Grans Absolute 0 02  0 00 - 0 20 Thousand/uL Final    Lymphocytes Absolute 2 53  0 60 - 4 47 Thousands/µL Final    Monocytes Absolute 0 38  0 17 - 1 22 Thousand/µL Final    Eosinophils Absolute 0 07  0 00 - 0 61 Thousand/µL Final    Basophils Absolute 0 02  0 00 - 0 10 Thousands/µL Final   LIGHT BLUE TOP     Time reflects when diagnosis was documented in both MDM as applicable and the Disposition within this note       Time User Action Codes Description Comment    3/28/2022 10:47 AM Beck Drought Add [R10 9] Abdominal pain     3/28/2022 10:47 AM Beck Drought Add [R07 9] Chest pain     3/28/2022 10:47 AM Beck Drought Modify [R10 9] Abdominal pain     3/28/2022 10:47 AM Beck Drought Modify [R07 9] Chest pain     3/28/2022 10:47 AM Beck Drought Add [R79 89] Elevated LFTs           ED Disposition       ED Disposition Condition Date/Time Comment    Discharge Stable Mon Mar 28, 2022 10:45 AM             Follow-up Information       Follow up With Specialties Details Why Contact Info Additional Information    [facility] Call   [address] [facility] [phone]       [facility]   [address] [phone]  [facility] Rosas [facility] [place] [address],  [address] [place] [address], [address]          Discharge Medication List as of 3/28/2022 10:48 AM        START taking these medications    Details   famotidine (PEPCID) 20 mg tablet Take 1 tablet (20 mg total) by mouth 2 (two) times a day, Starting Mon 3/28/2022, Normal           CONTINUE these medications which have NOT CHANGED    Details   albuterol (Ventolin HFA) 90 mcg/act inhaler Inhale 2 puffs every 6 (six) hours as needed for wheezing, Starting Mon 3/21/2022, Normal      amLODIPine (NORVASC) 5 mg tablet Take 1 tablet (5 mg total) by mouth daily, Starting Mon 3/21/2022, Until Sun 6/19/2022, Normal      b complex vitamins tablet Take 1 tablet by mouth daily, Historical Med      Cannabidiol 100 MG/ML SOLN Take by mouth, Historical Med      fluticasone (FLONASE) 50 mcg/act nasal spray 2 sprays into each nostril daily, Starting Tue 3/24/2020, Normal      Ginger Root POWD by Does not apply route, Historical Med      hydrochlorothiazide (HYDRODIURIL) 25 mg tablet Take 1 tablet (25 mg total) by mouth daily, Starting Mon 3/21/2022, Until Sun 2022, Normal      losartan (COZAAR) 50 mg tablet Take 1 tablet (50 mg total) by mouth daily, Starting Mon 3/21/2022, Until Sun 2022, Normal      Omega-3 Fatty Acids (OMEGA 3 500 PO) Take by mouth, Historical Med      pantoprazole (Protonix) 40 mg Take 40 mg by mouth daily in the early morning, Historical Med      RABEprazole (ACIPHEX) 20 MG tablet Take 1 tablet (20 mg total) by mouth daily, Starting Mon 3/14/2022, Normal              Prior to Admission Medications   Prescriptions Last Dose Informant Patient Reported? Taking? Cannabidiol 100 MG/ML SOLN  Self Yes Yes   Sig: Take by mouth   Ginger Root POWD  Self Yes Yes   Sig: by Does not apply route   Omega-3 Fatty Acids (OMEGA 3 500 PO)   Yes Yes   Sig: Take by mouth   RABEprazole (ACIPHEX) 20 MG tablet Not Taking at Unknown time  No No   Sig: Take 1 tablet (20 mg total) by mouth daily   Patient not taking: Reported on 3/28/2022    albuterol (Ventolin HFA) 90 mcg/act inhaler   No Yes   Sig: Inhale 2 puffs every 6 (six) hours as needed for wheezing   amLODIPine (NORVASC) 5 mg tablet   No Yes   Sig: Take 1 tablet (5 mg total) by mouth daily   b complex vitamins tablet  Self Yes Yes   Sig: Take 1 tablet by mouth daily   fluticasone (FLONASE) 50 mcg/act nasal spray  Self No Yes   Si sprays into each nostril daily   hydrochlorothiazide (HYDRODIURIL) 25 mg tablet   No Yes   Sig: Take 1 tablet (25 mg total) by mouth daily   losartan (COZAAR) 50 mg tablet   No Yes   Sig: Take 1 tablet (50 mg total) by mouth daily   pantoprazole (Protonix) 40 mg   Yes Yes   Sig: Take 40 mg by mouth daily in the early morning      Facility-Administered Medications: None       Portions of the record may have been created with voice recognition software  Occasional wrong word or "sound a like" substitutions may have occurred due to the inherent limitations of voice recognition software   Read the chart carefully and recognize, using context, where substitutions have occurred      Electronically signed by:  Leo Damico

## 2022-03-28 NOTE — PROGRESS NOTES
[facility]        NAME: Dheeraj Martini is a 49 y o  female  : 1970    MRN: [id number]  DATE: 2022  TIME: 8:47 AM    Assessment and Plan   SOB (shortness of breath) [R06 02]  1  SOB (shortness of breath)       Given symptoms and multiple artifacts on EKG, recommmnd ED aval      Patient Instructions       Follow up with PCP in 3-5 days  Proceed to  ER if symptoms worsen  Chief Complaint     Chief Complaint   Patient presents with    Shortness of Breath     x 1 week with tightness     Bloated    Chest Pain     chest tightness   and in left arm    wierd sensations in the legs         History of Present Illness       HPI   Patient presents complaining of shortness of breath ongoing for week with with accompanying chest tightness  She is also having some chest pain and pressure in her left arm  She has a weird tingling sensation in her legs as well  Patient also feels bloated  She has a history of obesity but no particular cardiac issues except for hypertension  Patient denies lightheadedness      Review of Systems   Review of Systems  Per hpi     Current Medications       Current Outpatient Medications:     albuterol (Ventolin HFA) 90 mcg/act inhaler, Inhale 2 puffs every 6 (six) hours as needed for wheezing, Disp: 8 g, Rfl: 2    amLODIPine (NORVASC) 5 mg tablet, Take 1 tablet (5 mg total) by mouth daily, Disp: 90 tablet, Rfl: 3    b complex vitamins tablet, Take 1 tablet by mouth daily, Disp: , Rfl:     Cannabidiol 100 MG/ML SOLN, Take by mouth, Disp: , Rfl:     fluticasone (FLONASE) 50 mcg/act nasal spray, 2 sprays into each nostril daily, Disp: 16 g, Rfl: 3    Ginger Root POWD, by Does not apply route, Disp: , Rfl:     hydrochlorothiazide (HYDRODIURIL) 25 mg tablet, Take 1 tablet (25 mg total) by mouth daily, Disp: 90 tablet, Rfl: 3    losartan (COZAAR) 50 mg tablet, Take 1 tablet (50 mg total) by mouth daily, Disp: 90 tablet, Rfl: 3    RABEprazole (ACIPHEX) 20 MG tablet, Take 1 tablet (20 mg total) by mouth daily, Disp: 90 tablet, Rfl: 1    Current Allergies     Allergies as of 03/28/2022 - Reviewed 03/28/2022   Allergen Reaction Noted    Cat hair extract      Celecoxib  06/18/2013    Cortisone  02/20/2017            The following portions of the patient's history were reviewed and updated as appropriate: allergies, current medications, past family history, past medical history, past social history, past surgical history and problem list      Past Medical History:   Diagnosis Date    Anemia     Last Assessed: 18 June 2013    Concussion     Eczema     Hypertension     Reactive airway disease     Sleep apnea        Past Surgical History:   Procedure Laterality Date    TOOTH EXTRACTION      UPPER GASTROINTESTINAL ENDOSCOPY         Family History   Problem Relation Age of Onset    Anemia Father     Hypertension Father     Stroke Father         stroke syndrome    Breast cancer Maternal Grandmother     Diabetes Maternal Grandmother     Heart attack Cousin          Medications have been verified  Objective   /73   Pulse 78   Temp 98 5 °F (36 9 °C)   Resp (!) 28   SpO2 99%   No LMP recorded  Physical Exam     Physical Exam  Constitutional:       General: She is not in acute distress  Appearance: Normal appearance  HENT:      Head: Normocephalic  Nose: No congestion or rhinorrhea  Mouth/Throat:      Mouth: Mucous membranes are moist       Pharynx: No oropharyngeal exudate or posterior oropharyngeal erythema  Eyes:      General:         Right eye: No discharge  Left eye: No discharge  Conjunctiva/sclera: Conjunctivae normal    Cardiovascular:      Rate and Rhythm: Normal rate and regular rhythm  Pulses: Normal pulses  Pulmonary:      Effort: Pulmonary effort is normal  No respiratory distress  Breath sounds: No decreased breath sounds, wheezing, rhonchi or rales     Abdominal:      General: Abdomen is flat  There is no distension  Palpations: Abdomen is soft  Tenderness: There is no abdominal tenderness  Musculoskeletal:      Cervical back: Neck supple  Skin:     General: Skin is warm  Capillary Refill: Capillary refill takes less than 2 seconds  Neurological:      Mental Status: She is alert and oriented to person, place, and time

## 2022-03-28 NOTE — DISCHARGE INSTRUCTIONS
Patient Instructions: Today you were seen in the emergency department for chest and abdominal pain  We reviewed your EKG, chest x-ray and your labs and determined that you would be able to be discharged  You should take tylenol as needed for your pain  Can try either your protonix or pepcid for possible gastritis  You should follow up with gastroenterology  Please return to the emergency department if your symptoms get worse, you develop a fever, or you have any other symptoms we discussed today prior to discharge  Nice to meet you! Best of luck with everything!

## 2022-03-29 NOTE — ED PROVIDER NOTES
Final Diagnosis:  1  Chest pain    2  Abdominal pain    3  Elevated LFTs      ED Course as of 03/29/22 1604   Mon Mar 28, 2022   1043 ALT(!): 115  Have patient follow up with    1043 AST(!): 77   1043 Procedure Note: EKG  Date/Time: 03/28/22 1043  Interpreted by: David Cardoza  Indications / Diagnosis: CP  ECG reviewed by me, the ED Provider: yes   The EKG demonstrates:  Rhythm: normal sinus  Intervals: normal intervals  Axis: normal axis  QRS/Blocks: normal QRS  ST Changes: No acute ST Changes, no STD/DANIAL  Chief Complaint   Patient presents with    Shortness of Breath     Pt  reportsing chest tightness increased since Tuesday  Reports stomach bloating  Hx of acid reflux but states it feels differently  +sob, reports new prescription for prednisone  ASSESSMENT + PLAN:   - Nursing note reviewed  1  Chest pain  -Cardiac w/u for ischemia  -EKG and CXR unremarkable  -Improved on reassessment  -Doesn't sound like PE, low risk Wells, PERC out    2  Abdominal pain  -patient's pain seems to be originating in her abdomen, does have a history of gastritis  -GI cocktail  -Denies any chance of pregnancy  -Found to have elevated slightly elevated LFTs, will follow up with GI for gastritis vs  Further w/u  -Tolerated PO and improved, will d/c    Procedures       Final Dispo   Patient was reassessed  Vital signs stable  Patient and/or family given discharge instructions and return precautions  Patient and/or family was reassured  The patient and/or family vocalizes understanding  Answered all of the patient's and/or family's questions  Will follow up with PCP and GI  Patient and/or family are agreeable to the plan          Medications   aluminum-magnesium hydroxide-simethicone (MYLANTA) oral suspension 30 mL (30 mL Oral Given 3/28/22 1024)   famotidine (PEPCID) tablet 20 mg (20 mg Oral Given 3/28/22 1024)     Time reflects when diagnosis was documented in both MDM as applicable and the Disposition within this note Time User Action Codes Description Comment    3/28/2022 10:47 AM Sonya Mare Add [R10 9] Abdominal pain     3/28/2022 10:47 AM Sonya Mare Add [R07 9] Chest pain     3/28/2022 10:47 AM Sonya Mare Modify [R10 9] Abdominal pain     3/28/2022 10:47 AM Sonya Mare Modify [R07 9] Chest pain     3/28/2022 10:47 AM Sonya Mare Add [R79 89] Elevated LFTs       ED Disposition     ED Disposition Condition Date/Time Comment    Discharge Stable Mon Mar 28, 2022 1045         Follow-up Information     Follow up With Specialties Details Why Contact Info Additional Information    [facility]   [address] [facility] [phone]       [facility]   [address] [facility] [address] [facility] [id number]  Rosina Rosas [facility] [place] [address], [address], [place], [place], [address]        Discharge Medication List as of 3/28/2022 10:48 AM      START taking these medications    Details   famotidine (PEPCID) 20 mg tablet Take 1 tablet (20 mg total) by mouth 2 (two) times a day, Starting Mon 3/28/2022, Normal         CONTINUE these medications which have NOT CHANGED    Details   albuterol (Ventolin HFA) 90 mcg/act inhaler Inhale 2 puffs every 6 (six) hours as needed for wheezing, Starting Mon 3/21/2022, Normal      amLODIPine (NORVASC) 5 mg tablet Take 1 tablet (5 mg total) by mouth daily, Starting Mon 3/21/2022, Until Sun 6/19/2022, Normal      b complex vitamins tablet Take 1 tablet by mouth daily, Historical Med      Cannabidiol 100 MG/ML SOLN Take by mouth, Historical Med      fluticasone (FLONASE) 50 mcg/act nasal spray 2 sprays into each nostril daily, Starting Tue 3/24/2020, Normal      Ginger Root POWD by Does not apply route, Historical Med      hydrochlorothiazide (HYDRODIURIL) 25 mg tablet Take 1 tablet (25 mg total) by mouth daily, Starting Mon 3/21/2022, Until Sun 2022, Normal      losartan (COZAAR) 50 mg tablet Take 1 tablet (50 mg total) by mouth daily, Starting Mon 3/21/2022, Until Sun 2022, Normal      Omega-3 Fatty Acids (OMEGA 3 500 PO) Take by mouth, Historical Med      pantoprazole (Protonix) 40 mg Take 40 mg by mouth daily in the early morning, Historical Med      RABEprazole (ACIPHEX) 20 MG tablet Take 1 tablet (20 mg total) by mouth daily, Starting Mon 3/14/2022, Normal             Prior to Admission Medications   Prescriptions Last Dose Informant Patient Reported? Taking? Cannabidiol 100 MG/ML SOLN  Self Yes Yes   Sig: Take by mouth   Ginger Root POWD  Self Yes Yes   Sig: by Does not apply route   Omega-3 Fatty Acids (OMEGA 3 500 PO)   Yes Yes   Sig: Take by mouth   RABEprazole (ACIPHEX) 20 MG tablet Not Taking at Unknown time  No No   Sig: Take 1 tablet (20 mg total) by mouth daily   Patient not taking: Reported on 3/28/2022    albuterol (Ventolin HFA) 90 mcg/act inhaler   No Yes   Sig: Inhale 2 puffs every 6 (six) hours as needed for wheezing   amLODIPine (NORVASC) 5 mg tablet   No Yes   Sig: Take 1 tablet (5 mg total) by mouth daily   b complex vitamins tablet  Self Yes Yes   Sig: Take 1 tablet by mouth daily   fluticasone (FLONASE) 50 mcg/act nasal spray  Self No Yes   Si sprays into each nostril daily   hydrochlorothiazide (HYDRODIURIL) 25 mg tablet   No Yes   Sig: Take 1 tablet (25 mg total) by mouth daily   losartan (COZAAR) 50 mg tablet   No Yes   Sig: Take 1 tablet (50 mg total) by mouth daily   pantoprazole (Protonix) 40 mg   Yes Yes   Sig: Take 40 mg by mouth daily in the early morning      Facility-Administered Medications: None       History of Present Illness: This is a 49 y o  female PMH significant for HTN coming in today with complaint of chest/upper abdominal pain      Onset: Gradual  Location:  Epigastric/substernal chest border  Duration: Acutely worse Tuesday, but going on for a few weeks  Radiation:  From upper abdomen into chest  Associated Symptoms:  Bloating, nausea but no vomiting, no diarrhea or constipation, no dysuria or vaginal bleeding or vaginal discharge, some associated chest pain, but little shortness of breath, no associated diaphoresis  Severity: Mild  Attempted Treatments: Mylanta, Gas-x without relief  Historical Elements: Hx of H pylori    Relevant Social History  Social alcohol use    - No language barrier    - History obtained from patient and chart   - Reviewed and documented relevant past medical/family/social history  - There are no limitations to the history obtained  - Previous charting was reviewed  Some data reviewed included below for ease of access whether or not it is relevant to this patient encounter  Past Medical, Past Surgical History:    has a past medical history of Anemia, Concussion, Eczema, Hypertension, Reactive airway disease, and Sleep apnea  has a past surgical history that includes Tooth extraction and Upper gastrointestinal endoscopy  Allergies: Allergies   Allergen Reactions    Cat Hair Extract     Celecoxib      Other reaction(s): INEFFECTIVE  Other reaction(s): Other    Cortisone      Other reaction(s): Unknown Reaction       Social and Family History:     Social History     Substance and Sexual Activity   Alcohol Use No    Comment: Per [employer]: socially     Social History     Tobacco Use   Smoking Status Never Smoker   Smokeless Tobacco Never Used     Social History     Substance and Sexual Activity   Drug Use No       Review of Systems:   Review of Systems   Constitutional: Negative for chills, diaphoresis and fever  HENT: Negative  Eyes: Negative  Negative for visual disturbance  Respiratory: Negative  Negative for shortness of breath  Cardiovascular: Positive for chest pain  Gastrointestinal: Positive for abdominal pain  Negative for nausea and vomiting  Endocrine: Negative  Genitourinary: Negative  Musculoskeletal: Negative  Negative for myalgias  Skin: Negative  Negative for rash  Allergic/Immunologic: Negative  Neurological: Negative  Negative for weakness, light-headedness, numbness and headaches  Hematological: Negative  Psychiatric/Behavioral: Negative  All other systems reviewed and are negative  Physical Examination     Vitals:    03/28/22 0937 03/28/22 0938 03/28/22 0941 03/28/22 1000   BP:  132/82  114/71   BP Location:  Right arm  Right arm   Pulse: 102   74   Resp: 18 18  15   Temp:   97 8 °F (36 6 °C)    TempSrc:   Oral    SpO2:  100%  100%     Vitals reviewed by me  Physical Exam  Vitals and nursing note reviewed  Constitutional:       General: She is not in acute distress  Appearance: She is obese  She is not ill-appearing or toxic-appearing  HENT:      Head: Atraumatic  Right Ear: External ear normal       Left Ear: External ear normal       Nose: Nose normal       Mouth/Throat:      Pharynx: Oropharynx is clear  Eyes:      General: No scleral icterus  Extraocular Movements: Extraocular movements intact  Pupils: Pupils are equal, round, and reactive to light  Cardiovascular:      Rate and Rhythm: Normal rate  Pulses: Normal pulses  Pulmonary:      Effort: Pulmonary effort is normal  No respiratory distress  Breath sounds: Normal breath sounds  Chest:      Chest wall: No tenderness  Abdominal:      General: There is no distension  Tenderness: There is no abdominal tenderness  There is no guarding or rebound  Musculoskeletal:         General: No deformity  Normal range of motion  Skin:     Findings: No rash  Neurological:      General: No focal deficit present  Mental Status: She is oriented to person, place, and time  Mental status is at baseline  Cranial Nerves: No cranial nerve deficit  Motor: No weakness        Gait: Gait normal    Psychiatric:         Mood and Affect: Mood normal             Risk Stratification Tools     HEART Risk Score      Most Recent Value   Heart Score Risk Calculator    History 0 Filed at: 03/28/2022 1000   ECG 0 Filed at: 03/28/2022 1000   Age 1 Filed at: 03/28/2022 1000   Risk Factors 1 Filed at: 03/28/2022 1000   Troponin 0 Filed at: 03/28/2022 1000   HEART Score 2 Filed at: 03/28/2022 1000                   ED Course as of 03/29/22 1604   Mon Mar 28, 2022   1043 ALT(!): 115  Have patient follow up with    1043 AST(!): 77   1043 Procedure Note: EKG  Date/Time: 03/28/22 1043  Interpreted by: Duran Guzmán  Indications / Diagnosis: CP  ECG reviewed by me, the ED Provider: yes   The EKG demonstrates:  Rhythm: normal sinus  Intervals: normal intervals  Axis: normal axis  QRS/Blocks: normal QRS  ST Changes: No acute ST Changes, no STD/DANIAL  XR chest 1 view portable   Final Result      No acute cardiopulmonary disease  Findings are stable            Workstation performed: [id number]           Orders Placed This Encounter   Procedures    XR chest 1 view portable    CBC and differential    Comprehensive metabolic panel    HS Troponin 0hr (reflex protocol)    Lipase    Ambulatory Referral to Gastroenterology    Continuous cardiac monitoring    Continuous pulse oximetry    ECG 12 lead    ECG 12 lead       Labs:     Labs Reviewed   COMPREHENSIVE METABOLIC PANEL - Abnormal       Result Value Ref Range Status    Sodium 135 (*) 136 - 145 mmol/L Final    Potassium 3 5  3 5 - 5 3 mmol/L Final    Chloride 100  100 - 108 mmol/L Final    CO2 30  21 - 32 mmol/L Final    ANION GAP 5  4 - 13 mmol/L Final    BUN 12  5 - 25 mg/dL Final    Creatinine 0 83  0 60 - 1 30 mg/dL Final    Comment: Standardized to IDMS reference method    Glucose 123  65 - 140 mg/dL Final    Comment: If the patient is fasting, the ADA then defines impaired fasting glucose as > 100 mg/dL and diabetes as > or equal to 123 mg/dL    Specimen collection should occur prior to Sulfasalazine administration due to the potential for falsely depressed results  Specimen collection should occur prior to Sulfapyridine administration due to the potential for falsely elevated results  Calcium 10 0  8 3 - 10 1 mg/dL Final    AST 77 (*) 5 - 45 U/L Final    Comment: Specimen collection should occur prior to Sulfasalazine administration due to the potential for falsely depressed results   (*) 12 - 78 U/L Final    Comment: Specimen collection should occur prior to Sulfasalazine and/or Sulfapyridine administration due to the potential for falsely depressed results  Alkaline Phosphatase 84  46 - 116 U/L Final    Total Protein 8 7 (*) 6 4 - 8 2 g/dL Final    Albumin 4 4  3 5 - 5 0 g/dL Final    Total Bilirubin 0 72  0 20 - 1 00 mg/dL Final    Comment: Use of this assay is not recommended for patients undergoing treatment with eltrombopag due to the potential for falsely elevated results  eGFR 82  ml/min/1 73sq m Final    Narrative:     [place] guidelines for Chronic Kidney Disease (CKD):     Stage 1 with normal or high GFR (GFR > 90 mL/min/1 73 square meters)    Stage 2 Mild CKD (GFR = 60-89 mL/min/1 73 square meters)    Stage 3A Moderate CKD (GFR = 45-59 mL/min/1 73 square meters)    Stage 3B Moderate CKD (GFR = 30-44 mL/min/1 73 square meters)    Stage 4 Severe CKD (GFR = 15-29 mL/min/1 73 square meters)    Stage 5 End Stage CKD (GFR <15 mL/min/1 73 square meters)  Note: GFR calculation is accurate only with a steady state creatinine   HS TROPONIN I 0HR - Normal    hs TnI 0hr 4  "Refer to ACS Flowchart"- see link ng/L Final    Comment:                                              Initial (time 0) result  If >=50 ng/L, Myocardial injury suggested ;  Type of myocardial injury and treatment strategy  to be determined  If 5-49 ng/L, a delta result at 2 hours and or 4 hours will be needed to further evaluate    If <4 ng/L, and chest pain has been >3 hours since onset, patient may qualify for discharge based on the HEART score in the ED  If <5 ng/L and <3hours since onset of chest pain, a delta result at 2 hours will be needed to further evaluate  HS Troponin 99th Percentile URL of a Health Population=12 ng/L with a 95% Confidence Interval of 8-18 ng/L  Second Troponin (time 2 hours)  If calculated delta >= 20 ng/L,  Myocardial injury suggested ; Type of myocardial injury and treatment strategy to be determined  If 5-49 ng/L and the calculated delta is 5-19 ng/L, consult medical service for evaluation  Continue evaluation for ischemia on ecg and other possible etiology and repeat hs troponin at 4 hours  If delta is <5 ng/L at 2 hours, consider discharge based on risk stratification via the HEART score (if in ED), or [place] risk score in IP/Observation  HS Troponin 99th Percentile URL of a Health Population=12 ng/L with a 95% Confidence Interval of 8-18 ng/L     LIPASE - Normal    Lipase 144  73 - 393 u/L Final   CBC AND DIFFERENTIAL    WBC 6 04  4 31 - 10 16 Thousand/uL Final    RBC 4 74  3 81 - 5 12 Million/uL Final    Hemoglobin 13 2  11 5 - 15 4 g/dL Final    Hematocrit 41 7  34 8 - 46 1 % Final    MCV 88  82 - 98 fL Final    MCH 27 8  26 8 - 34 3 pg Final    MCHC 31 7  31 4 - 37 4 g/dL Final    RDW 12 7  11 6 - 15 1 % Final    MPV 9 8  8 9 - 12 7 fL Final    Platelets 916  224 - 390 Thousands/uL Final    nRBC 0  /100 WBCs Final    Neutrophils Relative 51  43 - 75 % Final    Immat GRANS % 0  0 - 2 % Final    Lymphocytes Relative 42  14 - 44 % Final    Monocytes Relative 6  4 - 12 % Final    Eosinophils Relative 1  0 - 6 % Final    Basophils Relative 0  0 - 1 % Final    Neutrophils Absolute 3 02  1 85 - 7 62 Thousands/µL Final    Immature Grans Absolute 0 02  0 00 - 0 20 Thousand/uL Final    Lymphocytes Absolute 2 53  0 60 - 4 47 Thousands/µL Final    Monocytes Absolute 0 38  0 17 - 1 22 Thousand/µL Final    Eosinophils Absolute 0 07  0 00 - 0 61 Thousand/µL Final    Basophils Absolute 0 02  0 00 - 0 10 Thousands/µL Final LIGHT BLUE TOP       Imaging:   XR chest 1 view portable    Result Date: 3/28/2022  Narrative: CHEST INDICATION:   chest pain  COMPARISON:  12/13/2021 EXAM PERFORMED/VIEWS:  XR CHEST PORTABLE Single view FINDINGS: Cardiomediastinal silhouette appears unremarkable  The lungs are clear  No pneumothorax or pleural effusion  Osseous structures appear within normal limits for patient age  Impression: No acute cardiopulmonary disease  Findings are stable Workstation performed: [id number]        Final Diagnosis:  1  Chest pain    2  Abdominal pain    3  Elevated LFTs        Code Status: No Order    Portions of the record may have been created with voice recognition software  Occasional wrong word or "sound a like" substitutions may have occurred due to the inherent limitations of voice recognition software  Read the chart carefully and recognize, using context, where substitutions have occurred      Electronically signed by:  Sissy Tejeda, PGY 3, MD Krystal Duane, MD  03/29/22 2807

## 2022-05-05 ENCOUNTER — TELEPHONE (OUTPATIENT)
Dept: FAMILY MEDICINE CLINIC | Facility: CLINIC | Age: 52
End: 2022-05-05

## 2022-05-05 NOTE — TELEPHONE ENCOUNTER
Patient is requesting to go back on Pantoprazole 40 mg  States Pepcid is more expensive       Send order to [facility]

## 2022-06-08 ENCOUNTER — TELEPHONE (OUTPATIENT)
Dept: FAMILY MEDICINE CLINIC | Facility: CLINIC | Age: 52
End: 2022-06-08

## 2022-06-08 NOTE — TELEPHONE ENCOUNTER
Spoke with pt and she stated that she is having a lot of pressure under her rib cage  She feels like she has to burp but is unable to do so  Patient stated that she did change her diet to exclude Spicy, acidic foods and is taking pantoprazole as prescribed  She did mention that she was having some Sob when she is still and on excursion  I spoke directly to Dr Dhiraj Shepard and he told me to advise pt to increase her pantoprazole twice a day, and is the sob gets worse or she develops chest pain to go the ER   We will she her Monday     I relayed these instructions to pt and she agreed to the plan

## 2022-06-13 ENCOUNTER — OFFICE VISIT (OUTPATIENT)
Dept: FAMILY MEDICINE CLINIC | Facility: CLINIC | Age: 52
End: 2022-06-13
Payer: COMMERCIAL

## 2022-06-13 VITALS
SYSTOLIC BLOOD PRESSURE: 124 MMHG | RESPIRATION RATE: 16 BRPM | HEART RATE: 80 BPM | HEIGHT: 64 IN | BODY MASS INDEX: 49.85 KG/M2 | WEIGHT: 292 LBS | TEMPERATURE: 98.3 F | DIASTOLIC BLOOD PRESSURE: 80 MMHG

## 2022-06-13 DIAGNOSIS — R06.00 DYSPNEA ON EXERTION: ICD-10-CM

## 2022-06-13 DIAGNOSIS — K21.9 GASTROESOPHAGEAL REFLUX DISEASE WITHOUT ESOPHAGITIS: Primary | ICD-10-CM

## 2022-06-13 PROBLEM — R06.09 DYSPNEA ON EXERTION: Status: ACTIVE | Noted: 2017-02-20

## 2022-06-13 PROCEDURE — 1036F TOBACCO NON-USER: CPT | Performed by: FAMILY MEDICINE

## 2022-06-13 PROCEDURE — 99214 OFFICE O/P EST MOD 30 MIN: CPT | Performed by: FAMILY MEDICINE

## 2022-06-13 NOTE — ASSESSMENT & PLAN NOTE
Patient has dyspnea on exertion  No cardiac studies perform other than EKGs which have been normal thus far  Will obtain cardiac stress test   Patient unable to exercise given knee pain  Will perform pharmacological stress test with dobutamine and echo    Follow-up after test

## 2022-06-13 NOTE — ASSESSMENT & PLAN NOTE
Would recommend that patient follow-up with GI later this month for further evaluation  Patient will need endoscopy to rule out reinfection with H pylori      Advised patient continue with Protonix 40 mg b i d     Follow-up after visit or as needed

## 2022-06-13 NOTE — PROGRESS NOTES
Assessment/Plan:    Gastroesophageal reflux disease without esophagitis  Would recommend that patient follow-up with GI later this month for further evaluation  Patient will need endoscopy to rule out reinfection with H pylori  Advised patient continue with Protonix 40 mg b i d     Follow-up after visit or as needed      Dyspnea on exertion  Patient has dyspnea on exertion  No cardiac studies perform other than EKGs which have been normal thus far  Will obtain cardiac stress test   Patient unable to exercise given knee pain  Will perform pharmacological stress test with dobutamine and echo  Follow-up after test        Diagnoses and all orders for this visit:    Gastroesophageal reflux disease without esophagitis    Dyspnea on exertion  -     Echo stress test, dobutamine; Future        Subjective:   Chief Complaint   Patient presents with    GI Problem     Some heaviness in chest, sometimes eating helps     Health Maintenance   Topic Date Due    Hepatitis C Screening  Never done    HIV Screening  Never done    Annual Physical  Never done    DTaP,Tdap,and Td Vaccines (1 - Tdap) Never done    Cervical Cancer Screening  Never done    Colorectal Cancer Screening  Never done    BMI: Followup Plan  06/18/2021    COVID-19 Vaccine (3 - Booster for Moderna series) 07/10/2021    Influenza Vaccine (Season Ended) 09/01/2022    Depression Screening  03/28/2023    BMI: Adult  06/13/2023    Pneumococcal Vaccine: Pediatrics (0 to 5 Years) and At-Risk Patients (6 to 59 Years)  Aged Out    HIB Vaccine  Aged Out    Hepatitis B Vaccine  Aged Out    IPV Vaccine  Aged Out    Hepatitis A Vaccine  Aged Out    Meningococcal ACWY Vaccine  Aged Out    HPV Vaccine  Aged Out        Patient ID: Doreen Byrd is a 49 y o  female  HPI    Abdominal bloating and epigastric discomfort since November 2021  Feeling intermitent pain/discomfort underneath the left rib cage    Also having dyspnea on exertion for about the same amount time  Has been on Protonix 40 mg b i d  which has helped somewhat  Has appointment with GI scheduled for later this month for further evaluation  Patient has lost 7 lb since last visit  Chart reviewed, had EGD done in 09/30/2019 which did show H pylori infection  Treated  Asymptomatic until last November  Patient often feel short of walking shorter distances  Has not had stress test done before  Unable to walk on treadmill  The following portions of the patient's history were reviewed and updated as appropriate: allergies, current medications, past family history, past medical history, past social history, past surgical history, and problem list     Review of Systems   Constitutional: Negative for chills and fever  HENT: Negative for congestion  Respiratory: Positive for shortness of breath  Negative for cough  Gastrointestinal: Negative for diarrhea, nausea and vomiting  Abdominal bloating   Genitourinary: Negative for dysuria  Musculoskeletal: Negative for myalgias  Skin: Negative for rash  Neurological: Negative for dizziness and headaches  Objective:  /80   Pulse 80   Temp 98 3 °F (36 8 °C) (Tympanic)   Resp 16   Ht 5' 4" (1 626 m)   Wt 132 kg (292 lb)   BMI 50 12 kg/m²      Physical Exam  Vitals and nursing note reviewed  Constitutional:       General: She is not in acute distress  Appearance: She is obese  HENT:      Head: Normocephalic and atraumatic  Eyes:      Conjunctiva/sclera: Conjunctivae normal    Cardiovascular:      Rate and Rhythm: Normal rate and regular rhythm  Pulses: Normal pulses  Heart sounds: No murmur heard  Pulmonary:      Effort: Pulmonary effort is normal  No respiratory distress  Breath sounds: No wheezing or rales  Abdominal:      General: Bowel sounds are normal  There is no distension  Palpations: Abdomen is soft  Tenderness: There is no abdominal tenderness  Neurological:      Mental Status: She is alert  This note has been constructed using a voice recognition system  There may be translation, syntax, or grammatical errors  If you have an questions, please contact the dictating provider

## 2022-06-24 ENCOUNTER — TELEPHONE (OUTPATIENT)
Dept: GASTROENTEROLOGY | Facility: CLINIC | Age: 52
End: 2022-06-24

## 2022-06-24 ENCOUNTER — TELEPHONE (OUTPATIENT)
Dept: OTHER | Facility: OTHER | Age: 52
End: 2022-06-24

## 2022-06-24 NOTE — TELEPHONE ENCOUNTER
Patient called back providing her email, she states that she believes the packet is supposed to be instructions on what she can and cant eat after a certain time, from her last visit  Email: [email or web address]

## 2022-06-24 NOTE — TELEPHONE ENCOUNTER
Returned patient's call to see what specific paperwork was to be mailed to her and to see if we can email it to her - left message for call back

## 2022-06-27 ENCOUNTER — OFFICE VISIT (OUTPATIENT)
Dept: GASTROENTEROLOGY | Facility: CLINIC | Age: 52
End: 2022-06-27
Payer: COMMERCIAL

## 2022-06-27 VITALS
HEIGHT: 64 IN | DIASTOLIC BLOOD PRESSURE: 70 MMHG | WEIGHT: 288 LBS | SYSTOLIC BLOOD PRESSURE: 110 MMHG | BODY MASS INDEX: 49.17 KG/M2

## 2022-06-27 DIAGNOSIS — R07.9 CHEST PAIN, UNSPECIFIED TYPE: ICD-10-CM

## 2022-06-27 DIAGNOSIS — R10.13 EPIGASTRIC PAIN: Primary | ICD-10-CM

## 2022-06-27 DIAGNOSIS — Z12.11 SCREEN FOR COLON CANCER: ICD-10-CM

## 2022-06-27 PROCEDURE — 99203 OFFICE O/P NEW LOW 30 MIN: CPT | Performed by: PHYSICIAN ASSISTANT

## 2022-06-27 PROCEDURE — 3008F BODY MASS INDEX DOCD: CPT | Performed by: FAMILY MEDICINE

## 2022-06-27 NOTE — PATIENT INSTRUCTIONS
Scheduled date of colonoscopy/egd  (as of today): 7/25/22  Physician performing colonoscopy: Dr Leonid Esqueda  Location of colonoscopy: [facility]  Bowel prep reviewed with patient: miralax/dulcolax  Instructions reviewed with patient by: Maisha   Clearances:  n/a

## 2022-06-27 NOTE — H&P (VIEW-ONLY)
[facility] - Outpatient Consultation  Deandre Hill 49 y o  female MRN: [id number]  Encounter: [id number]          ASSESSMENT AND PLAN:      1  Epigastric pain  2  Episodic Chest pain  3  Screen for colon cancer  4  Shortness of breath with exertion      Patient presents for evaluation of chest and abdominal discomfort and shortness of breath  Cardiac workup to date has been unremarkable and she is planned for a stress echo next month  She was encouraged to continue her cardiac workup in addition to pursuing GI evaluation  With her symptoms of epigastric pain/fullness/pressure will plan for EGD to evaluate for esophagitis/gastritis/PUD/H pylori  She has a prescription for protonix and can continue once daily  Avoidance of common dietary triggers has not improved her symptoms  Will await findings of EGD  She has never had a screening test for colon cancer  Will plan for colonoscopy for screening purposes  Procedure and prep was discussed in detail  She is in agreement with the above plan - all her questions were answered  ______________________________________________________________________    HPI:  Bruno Oliver is a 60-year-old female with controlled hypertension who presents to the office with symptoms of chest/abdominal pain and shortness of breath  She has a prior history from around 2018 of epigastric pain and gastritis which was attributed to H pylori  This was diagnosed by endoscopy and treated with antibiotics, by her recollection  She has had intermittent symptoms of reflux since that time  She had a more recent episode of chest pressure and discomfort that was evaluate in the emergency room  EKG was unremarkable and she was treated with a GI cocktail which improved her symptoms  She is planned for a stress echo next month  She states her symptoms at that time felt like intense pressure and fullness "expanding [her] ribs "  She denies nausea or vomiting    No melena or hematochezia  She eats a mostly plant-based diet  She states she has tried avoiding tomatoes and other common triggers without improvement  She has never had a colonoscopy or other colon cancer screening test   There is no family history of colon cancer  REVIEW OF SYSTEMS:    CONSTITUTIONAL: Denies any fever, chills, rigors, and weight loss  HEENT: No earache or tinnitus  Denies hearing loss or visual disturbances  CARDIOVASCULAR: No chest pain or palpitations  RESPIRATORY: Denies any cough, hemoptysis, shortness of breath or dyspnea on exertion  GASTROINTESTINAL: As noted in the History of Present Illness  GENITOURINARY: No problems with urination  Denies any hematuria or dysuria  NEUROLOGIC: No dizziness or vertigo, denies headaches  MUSCULOSKELETAL: Denies any muscle or joint pain  SKIN: Denies skin rashes or itching  ENDOCRINE: Denies excessive thirst  Denies intolerance to heat or cold  PSYCHOSOCIAL: Denies depression or anxiety  Denies any recent memory loss         Historical Information   Past Medical History:   Diagnosis Date    Anemia     Last Assessed: 18 June 2013    Concussion     Eczema     Hypertension     Reactive airway disease     Sleep apnea      Past Surgical History:   Procedure Laterality Date    TOOTH EXTRACTION      UPPER GASTROINTESTINAL ENDOSCOPY       Social History   Social History     Substance and Sexual Activity   Alcohol Use No    Comment: Per [employer]: socially     Social History     Substance and Sexual Activity   Drug Use No     Social History     Tobacco Use   Smoking Status Never Smoker   Smokeless Tobacco Never Used     Family History   Problem Relation Age of Onset    Anemia Father     Hypertension Father     Stroke Father         stroke syndrome    Breast cancer Maternal Grandmother     Diabetes Maternal Grandmother     Heart attack Cousin        Meds/Allergies       Current Outpatient Medications:     albuterol (Ventolin HFA) 90 mcg/act inhaler    Cannabidiol 100 MG/ML SOLN    fluticasone (FLONASE) 50 mcg/act nasal spray    Shannan Root POWD    hydrochlorothiazide (HYDRODIURIL) 25 mg tablet    losartan (COZAAR) 50 mg tablet    Omega-3 Fatty Acids (OMEGA 3 500 PO)    pantoprazole (PROTONIX) 40 mg tablet    amLODIPine (NORVASC) 5 mg tablet    b complex vitamins tablet    Allergies   Allergen Reactions    Cat Hair Extract     Celecoxib      Other reaction(s): INEFFECTIVE  Other reaction(s): Other    Cortisone      Other reaction(s): Unknown Reaction           Objective     Blood pressure 110/70, height 5' 4" (1 626 m), weight 131 kg (288 lb), not currently breastfeeding  Body mass index is 49 44 kg/m²  PHYSICAL EXAM:      General Appearance:   Alert, cooperative, no distress   HEENT:   Normocephalic, atraumatic, anicteric      Neck:  Supple, symmetrical, trachea midline   Lungs:   Clear to auscultation bilaterally; no rales, rhonchi or wheezing; respirations unlabored    Heart[de-identified]   Regular rate and rhythm; no murmur, rub, or gallop     Abdomen:   Non-Distended    Genitalia:   Deferred    Rectal:   Deferred    Extremities:  No cyanosis, clubbing or edema    Pulses:  2+ and symmetric    Skin:  No rashes or lesions    Lymph nodes:  No palpable cervical lymphadenopathy          Charity Harrell PA-C

## 2022-06-27 NOTE — PROGRESS NOTES
[facility] - Outpatient Consultation  [facility] 49 [facility] MRN: [id number]  Encounter: [id number]          ASSESSMENT AND PLAN:      1  Epigastric pain  2  Episodic Chest pain  3  Screen for colon cancer  4  Shortness of breath with exertion      Patient presents for evaluation of chest and abdominal discomfort and shortness of breath  Cardiac workup to date has been unremarkable and she is planned for a stress echo next month  She was encouraged to continue her cardiac workup in addition to pursuing GI evaluation  With her symptoms of epigastric pain/fullness/pressure will plan for EGD to evaluate for esophagitis/gastritis/PUD/H pylori  She has a prescription for protonix and can continue once daily  Avoidance of common dietary triggers has not improved her symptoms  Will await findings of EGD  She has never had a screening test for colon cancer  Will plan for colonoscopy for screening purposes  Procedure and prep was discussed in detail  She is in agreement with the above plan - all her questions were answered  ______________________________________________________________________    HPI:  Boris Huang is a 71-year-old female with controlled hypertension who presents to the office with symptoms of chest/abdominal pain and shortness of breath  She has a prior history from around 2018 of epigastric pain and gastritis which was attributed to H pylori  This was diagnosed by endoscopy and treated with antibiotics, by her recollection  She has had intermittent symptoms of reflux since that time  She had a more recent episode of chest pressure and discomfort that was evaluate in the emergency room  EKG was unremarkable and she was treated with a GI cocktail which improved her symptoms  She is planned for a stress echo next month  She states her symptoms at that time felt like intense pressure and fullness "expanding [her] ribs "  She denies nausea or vomiting    No melena or hematochezia  She eats a mostly plant-based diet  She states she has tried avoiding tomatoes and other common triggers without improvement  She has never had a colonoscopy or other colon cancer screening test   There is no family history of colon cancer  REVIEW OF SYSTEMS:    CONSTITUTIONAL: Denies any fever, chills, rigors, and weight loss  HEENT: No earache or tinnitus  Denies hearing loss or visual disturbances  CARDIOVASCULAR: No chest pain or palpitations  RESPIRATORY: Denies any cough, hemoptysis, shortness of breath or dyspnea on exertion  GASTROINTESTINAL: As noted in the History of Present Illness  GENITOURINARY: No problems with urination  Denies any hematuria or dysuria  NEUROLOGIC: No dizziness or vertigo, denies headaches  MUSCULOSKELETAL: Denies any muscle or joint pain  SKIN: Denies skin rashes or itching  ENDOCRINE: Denies excessive thirst  Denies intolerance to heat or cold  PSYCHOSOCIAL: Denies depression or anxiety  Denies any recent memory loss         Historical Information   Past Medical History:   Diagnosis Date    Anemia     Last Assessed: 18 June 2013    Concussion     Eczema     Hypertension     Reactive airway disease     Sleep apnea      Past Surgical History:   Procedure Laterality Date    TOOTH EXTRACTION      UPPER GASTROINTESTINAL ENDOSCOPY       Social History   Social History     Substance and Sexual Activity   Alcohol Use No    Comment: Per [employer]: socially     Social History     Substance and Sexual Activity   Drug Use No     Social History     Tobacco Use   Smoking Status Never Smoker   Smokeless Tobacco Never Used     Family History   Problem Relation Age of Onset    Anemia Father     Hypertension Father     Stroke Father         stroke syndrome    Breast cancer Maternal Grandmother     Diabetes Maternal Grandmother     Heart attack Cousin        Meds/Allergies       Current Outpatient Medications:     albuterol (Ventolin HFA) 90 mcg/act inhaler    Cannabidiol 100 MG/ML SOLN    fluticasone (FLONASE) 50 mcg/act nasal spray    Shannan Root POWD    hydrochlorothiazide (HYDRODIURIL) 25 mg tablet    losartan (COZAAR) 50 mg tablet    Omega-3 Fatty Acids (OMEGA 3 500 PO)    pantoprazole (PROTONIX) 40 mg tablet    amLODIPine (NORVASC) 5 mg tablet    b complex vitamins tablet    Allergies   Allergen Reactions    Cat Hair Extract     Celecoxib      Other reaction(s): INEFFECTIVE  Other reaction(s): Other    Cortisone      Other reaction(s): Unknown Reaction           Objective     Blood pressure 110/70, height 5' 4" (1 626 m), weight 131 kg (288 lb), not currently breastfeeding  Body mass index is 49 44 kg/m²  PHYSICAL EXAM:      General Appearance:   Alert, cooperative, no distress   HEENT:   Normocephalic, atraumatic, anicteric      Neck:  Supple, symmetrical, trachea midline   Lungs:   Clear to auscultation bilaterally; no rales, rhonchi or wheezing; respirations unlabored    Heart[de-identified]   Regular rate and rhythm; no murmur, rub, or gallop     Abdomen:   Non-Distended    Genitalia:   Deferred    Rectal:   Deferred    Extremities:  No cyanosis, clubbing or edema    Pulses:  2+ and symmetric    Skin:  No rashes or lesions    Lymph nodes:  No palpable cervical lymphadenopathy          Pina Espinosa PA-C

## 2022-07-14 ENCOUNTER — TELEPHONE (OUTPATIENT)
Dept: NON INVASIVE DIAGNOSTICS | Facility: HOSPITAL | Age: 52
End: 2022-07-14

## 2022-07-14 DIAGNOSIS — R06.09 DYSPNEA ON EXERTION: Primary | ICD-10-CM

## 2022-07-25 ENCOUNTER — HOSPITAL ENCOUNTER (OUTPATIENT)
Dept: GASTROENTEROLOGY | Facility: AMBULARY SURGERY CENTER | Age: 52
Setting detail: OUTPATIENT SURGERY
Discharge: HOME | End: 2022-07-25
Payer: COMMERCIAL

## 2022-07-25 ENCOUNTER — ANESTHESIA (OUTPATIENT)
Dept: GASTROENTEROLOGY | Facility: AMBULARY SURGERY CENTER | Age: 52
End: 2022-07-25

## 2022-07-25 ENCOUNTER — ANESTHESIA EVENT (OUTPATIENT)
Dept: GASTROENTEROLOGY | Facility: AMBULARY SURGERY CENTER | Age: 52
End: 2022-07-25

## 2022-07-25 VITALS
WEIGHT: 283 LBS | BODY MASS INDEX: 48.32 KG/M2 | HEART RATE: 70 BPM | DIASTOLIC BLOOD PRESSURE: 71 MMHG | OXYGEN SATURATION: 99 % | TEMPERATURE: 98.5 F | SYSTOLIC BLOOD PRESSURE: 117 MMHG | RESPIRATION RATE: 69 BRPM | HEIGHT: 64 IN

## 2022-07-25 DIAGNOSIS — R10.13 EPIGASTRIC PAIN: ICD-10-CM

## 2022-07-25 DIAGNOSIS — Z12.11 SCREEN FOR COLON CANCER: ICD-10-CM

## 2022-07-25 PROCEDURE — G0121 COLON CA SCRN NOT HI RSK IND: HCPCS | Performed by: INTERNAL MEDICINE

## 2022-07-25 PROCEDURE — 88342 IMHCHEM/IMCYTCHM 1ST ANTB: CPT | Performed by: PATHOLOGY

## 2022-07-25 PROCEDURE — 88305 TISSUE EXAM BY PATHOLOGIST: CPT | Performed by: PATHOLOGY

## 2022-07-25 PROCEDURE — 43239 EGD BIOPSY SINGLE/MULTIPLE: CPT | Performed by: INTERNAL MEDICINE

## 2022-07-25 RX ORDER — PROMETHAZINE HYDROCHLORIDE 25 MG/ML
12.5 INJECTION, SOLUTION INTRAMUSCULAR; INTRAVENOUS ONCE AS NEEDED
Status: CANCELLED | OUTPATIENT
Start: 2022-07-25

## 2022-07-25 RX ORDER — ALBUTEROL SULFATE 2.5 MG/3ML
2.5 SOLUTION RESPIRATORY (INHALATION) ONCE AS NEEDED
Status: CANCELLED | OUTPATIENT
Start: 2022-07-25

## 2022-07-25 RX ORDER — FENTANYL CITRATE/PF 50 MCG/ML
25 SYRINGE (ML) INJECTION
Status: CANCELLED | OUTPATIENT
Start: 2022-07-25

## 2022-07-25 RX ORDER — SODIUM CHLORIDE, SODIUM LACTATE, POTASSIUM CHLORIDE, CALCIUM CHLORIDE 600; 310; 30; 20 MG/100ML; MG/100ML; MG/100ML; MG/100ML
INJECTION, SOLUTION INTRAVENOUS CONTINUOUS PRN
Status: DISCONTINUED | OUTPATIENT
Start: 2022-07-25 | End: 2022-07-25

## 2022-07-25 RX ORDER — HYDROMORPHONE HCL 110MG/55ML
0.5 PATIENT CONTROLLED ANALGESIA SYRINGE INTRAVENOUS
Status: CANCELLED | OUTPATIENT
Start: 2022-07-25

## 2022-07-25 RX ORDER — LIDOCAINE HYDROCHLORIDE 10 MG/ML
INJECTION, SOLUTION EPIDURAL; INFILTRATION; INTRACAUDAL; PERINEURAL AS NEEDED
Status: DISCONTINUED | OUTPATIENT
Start: 2022-07-25 | End: 2022-07-25

## 2022-07-25 RX ORDER — PROPOFOL 10 MG/ML
INJECTION, EMULSION INTRAVENOUS AS NEEDED
Status: DISCONTINUED | OUTPATIENT
Start: 2022-07-25 | End: 2022-07-25

## 2022-07-25 RX ORDER — MEPERIDINE HYDROCHLORIDE 25 MG/ML
12.5 INJECTION INTRAMUSCULAR; INTRAVENOUS; SUBCUTANEOUS
Status: CANCELLED | OUTPATIENT
Start: 2022-07-25

## 2022-07-25 RX ORDER — ONDANSETRON 2 MG/ML
4 INJECTION INTRAMUSCULAR; INTRAVENOUS ONCE AS NEEDED
Status: CANCELLED | OUTPATIENT
Start: 2022-07-25

## 2022-07-25 RX ADMIN — PROPOFOL 50 MG: 10 INJECTION, EMULSION INTRAVENOUS at 13:53

## 2022-07-25 RX ADMIN — PROPOFOL 50 MG: 10 INJECTION, EMULSION INTRAVENOUS at 13:46

## 2022-07-25 RX ADMIN — PROPOFOL 50 MG: 10 INJECTION, EMULSION INTRAVENOUS at 14:06

## 2022-07-25 RX ADMIN — PROPOFOL 50 MG: 10 INJECTION, EMULSION INTRAVENOUS at 13:57

## 2022-07-25 RX ADMIN — PROPOFOL 50 MG: 10 INJECTION, EMULSION INTRAVENOUS at 13:44

## 2022-07-25 RX ADMIN — PROPOFOL 50 MG: 10 INJECTION, EMULSION INTRAVENOUS at 14:14

## 2022-07-25 RX ADMIN — LIDOCAINE HYDROCHLORIDE 50 MG: 10 INJECTION, SOLUTION EPIDURAL; INFILTRATION; INTRACAUDAL; PERINEURAL at 13:42

## 2022-07-25 RX ADMIN — SODIUM CHLORIDE, SODIUM LACTATE, POTASSIUM CHLORIDE, AND CALCIUM CHLORIDE: .6; .31; .03; .02 INJECTION, SOLUTION INTRAVENOUS at 13:39

## 2022-07-25 RX ADMIN — PROPOFOL 50 MG: 10 INJECTION, EMULSION INTRAVENOUS at 13:50

## 2022-07-25 RX ADMIN — PROPOFOL 50 MG: 10 INJECTION, EMULSION INTRAVENOUS at 13:48

## 2022-07-25 RX ADMIN — PROPOFOL 50 MG: 10 INJECTION, EMULSION INTRAVENOUS at 14:11

## 2022-07-25 RX ADMIN — PROPOFOL 100 MG: 10 INJECTION, EMULSION INTRAVENOUS at 13:42

## 2022-07-25 RX ADMIN — PROPOFOL 50 MG: 10 INJECTION, EMULSION INTRAVENOUS at 14:02

## 2022-07-25 RX ADMIN — PROPOFOL 50 MG: 10 INJECTION, EMULSION INTRAVENOUS at 13:43

## 2022-07-25 NOTE — INTERVAL H&P NOTE
H&P reviewed  After examining the patient I find no changes in the patients condition since the H&P had been written      Vitals:    07/25/22 1328   BP: 124/73   Pulse: 77   Resp: 18   Temp: 97 7 °F (36 5 °C)   SpO2: 97%

## 2022-07-25 NOTE — ANESTHESIA PREPROCEDURE EVALUATION
Procedure:  EGD  COLONOSCOPY    Relevant Problems   CARDIO   (+) Dyspnea on exertion   (+) Essential hypertension   (+) Hyperlipidemia      GI/HEPATIC   (+) Gastroesophageal reflux disease without esophagitis      MUSCULOSKELETAL   (+) Osteoarthritis of knee   (+) Sacroiliitis (HCC)      NEURO/PSYCH   (+) Anxiety   (+) History of Helicobacter pylori infection      PULMONARY   (+) Dyspnea on exertion   (+) Obstructive sleep apnea      Other   (+) Class 3 severe obesity due to excess calories without serious comorbidity with body mass index (BMI) of 50 0 to 59 9 in adult [facility])        Physical Exam    Airway    Mallampati score: II  TM Distance: >3 FB  Neck ROM: full     Dental       Cardiovascular  Cardiovascular exam normal    Pulmonary  Pulmonary exam normal     Other Findings        Anesthesia Plan  ASA Score- 3     Anesthesia Type- IV sedation with anesthesia with ASA Monitors  Additional Monitors:   Airway Plan:           Plan Factors-Exercise tolerance (METS): >4 METS  Chart reviewed  EKG reviewed  Imaging results reviewed  Existing labs reviewed  Patient summary reviewed  Induction- intravenous  Postoperative Plan- Plan for postoperative opioid use  Planned trial extubation    Informed Consent- Anesthetic plan and risks discussed with patient  I personally reviewed this patient with the CRNA  Discussed and agreed on the Anesthesia Plan with the CRNA  Breanna Gaston

## 2022-07-25 NOTE — ANESTHESIA POSTPROCEDURE EVALUATION
Post-Op Assessment Note    CV Status:  Stable  Pain Score: 0    Pain management: adequate     Mental Status:  Arousable   Hydration Status:  Euvolemic   PONV Controlled:  Controlled   Airway Patency:  Patent      Post Op Vitals Reviewed: Yes      Staff: Anesthesiologist, CRNA         No complications documented      BP   126/78   Temp   97 6   Pulse  78   Resp   16   SpO2   96

## 2022-08-01 ENCOUNTER — HOSPITAL ENCOUNTER (OUTPATIENT)
Dept: NON INVASIVE DIAGNOSTICS | Facility: CLINIC | Age: 52
Discharge: HOME | End: 2022-08-01
Payer: COMMERCIAL

## 2022-08-01 ENCOUNTER — TELEPHONE (OUTPATIENT)
Dept: FAMILY MEDICINE CLINIC | Facility: CLINIC | Age: 52
End: 2022-08-01

## 2022-08-01 DIAGNOSIS — R06.09 DYSPNEA ON EXERTION: ICD-10-CM

## 2022-08-01 LAB
BASELINE ST DEPRESSION: 0 MM
MAX HR: 127 BPM
NUC STRESS EJECTION FRACTION: 66 %
RATE PRESSURE PRODUCT: NORMAL
SL CV REST NUCLEAR ISOTOPE DOSE: 15.41 MCI
SL CV STRESS NUCLEAR ISOTOPE DOSE: 45.2 MCI
SL CV STRESS RECOVERY BP: NORMAL MMHG
SL CV STRESS RECOVERY HR: 83 BPM
SL CV STRESS RECOVERY O2 SAT: 98 %
STRESS ANGINA INDEX: 0
STRESS BASELINE BP: NORMAL MMHG
STRESS BASELINE HR: 74 BPM
STRESS O2 SAT REST: 97 %
STRESS PEAK HR: 121 BPM
STRESS POST O2 SAT PEAK: 99 %
STRESS POST PEAK BP: 126 MMHG
STRESS ST DEPRESSION: 0 MM
STRESS/REST PERFUSION RATIO: 1.1

## 2022-08-01 PROCEDURE — 93018 CV STRESS TEST I&R ONLY: CPT | Performed by: INTERNAL MEDICINE

## 2022-08-01 PROCEDURE — 78452 HT MUSCLE IMAGE SPECT MULT: CPT

## 2022-08-01 PROCEDURE — 93017 CV STRESS TEST TRACING ONLY: CPT

## 2022-08-01 PROCEDURE — A9502 TC99M TETROFOSMIN: HCPCS

## 2022-08-01 PROCEDURE — G1004 CDSM NDSC: HCPCS

## 2022-08-01 PROCEDURE — 78452 HT MUSCLE IMAGE SPECT MULT: CPT | Performed by: INTERNAL MEDICINE

## 2022-08-01 PROCEDURE — 93016 CV STRESS TEST SUPVJ ONLY: CPT | Performed by: INTERNAL MEDICINE

## 2022-08-01 RX ADMIN — REGADENOSON 0.4 MG: 0.08 INJECTION, SOLUTION INTRAVENOUS at 08:50

## 2022-08-01 NOTE — TELEPHONE ENCOUNTER
Kisha Brito 24 saw this patient in June and ordered stress test pharmacological stressed  Please review results with patient  Thank you

## 2022-08-03 DIAGNOSIS — R94.39 ABNORMAL STRESS TEST: Primary | ICD-10-CM

## 2022-08-03 LAB
CHEST PAIN STATEMENT: NORMAL
MAX DIASTOLIC BP: 80 MMHG
MAX HEART RATE: 127 BPM
MAX PREDICTED HEART RATE: 170 BPM
MAX. SYSTOLIC BP: 134 MMHG
PROTOCOL NAME: NORMAL
REASON FOR TERMINATION: NORMAL
TARGET HR FORMULA: NORMAL
TEST INDICATION: NORMAL
TIME IN EXERCISE PHASE: NORMAL

## 2022-08-03 NOTE — TELEPHONE ENCOUNTER
Called patient back in discussed results  Recommend that patient follow-up with cardiologist   Patient was in agreement  Also recommend that patient try to exercise  Swimming is if possible

## 2022-08-03 NOTE — TELEPHONE ENCOUNTER
Patient returned call received from doctor in regards to stress test  Patient had a few follow up questions regarding recommendations left on voice mail  Asks if provider could call back to discuss

## 2022-08-03 NOTE — TELEPHONE ENCOUNTER
Patient phoned back to get her results - she is available at [phone] until 10 a m  today and then be at work until 7 p m  You can leave a voicemail at this number if you don't get her

## 2022-08-03 NOTE — TELEPHONE ENCOUNTER
Called and left VM about referral to cardiology   Stress perfusion test was negative but ekg was normal

## 2022-08-19 ENCOUNTER — PREP FOR PROCEDURE (OUTPATIENT)
Dept: NON INVASIVE DIAGNOSTICS | Facility: HOSPITAL | Age: 52
End: 2022-08-19

## 2022-08-19 ENCOUNTER — OFFICE VISIT (OUTPATIENT)
Dept: CARDIOLOGY CLINIC | Facility: CLINIC | Age: 52
End: 2022-08-19
Payer: COMMERCIAL

## 2022-08-19 VITALS
WEIGHT: 293 LBS | DIASTOLIC BLOOD PRESSURE: 72 MMHG | OXYGEN SATURATION: 97 % | HEIGHT: 64 IN | SYSTOLIC BLOOD PRESSURE: 110 MMHG | HEART RATE: 81 BPM | BODY MASS INDEX: 50.02 KG/M2

## 2022-08-19 DIAGNOSIS — R94.39 ABNORMAL STRESS TEST: ICD-10-CM

## 2022-08-19 DIAGNOSIS — R94.39 ABNORMAL STRESS TEST: Primary | ICD-10-CM

## 2022-08-19 PROCEDURE — 99204 OFFICE O/P NEW MOD 45 MIN: CPT | Performed by: INTERNAL MEDICINE

## 2022-08-19 PROCEDURE — 93000 ELECTROCARDIOGRAM COMPLETE: CPT | Performed by: INTERNAL MEDICINE

## 2022-08-19 RX ORDER — ASPIRIN 81 MG/1
324 TABLET, CHEWABLE ORAL ONCE
OUTPATIENT
Start: 2022-08-19 | End: 2022-08-19

## 2022-08-19 NOTE — PROGRESS NOTES
Reason for Consult / Principal Problem:  Positive stress test    Physician Requesting Consult:  No att  providers found      Assessment and Plan      Current Problem List   Active Problems:    * No active hospital problems  *    Assessment/Plan:    1  Dyspnea on exertion, positive NM stress test  · Patient was referred to Cardiology from her PCP given that she has positive NM stress test   · She has been complaining of dyspnea on exertion since November 2021  Given that she has knee pain, PCP ordered NM stress test which was done in August   · Stress test showed "large area of ischemia of anterior, inferior and inferoseptal walls  She is an obese woman who could not be proned  Contribution of breast and diaphragmatic artifacts could not be excluded as left ventricular function is normal with no diagnostic wall motion abnormalities "   · Risk factor includes obesity, HTN, hyperlipidemia although no recent labs  Not on statin  · Patient admits to having dyspnea on exertion and admits to having a feeling of tightening band on her chest with exertion  Her symptoms are also present at rest although they are worse with exertion  Symptoms has been worsening since November  · Patient has tried changing her diet, ppi, also had EGD which was overall unremarkable  · Given her symptoms which are very concerning for angina and her risk factors, will proceed with cardiac catheterization  Subjective     CC: MUÑOZ    HPI: Reetha L Dubinkin 49y o  year old female with past medical history of hyperlipidemia, hypertension, obesity who presents as a referral from PCP given positive stress test   Patient complain of dyspnea on exertion back in June 2 her PCP  Given that she has bilateral knee osteoarthritis, NM stress test was ordered  Stress test showed "large area of ischemia of anterior, inferior and inferoseptal walls  She is an obese woman who could not be proned   Contribution of breast and diaphragmatic artifacts could not be excluded as left ventricular function is normal with no diagnostic wall motion abnormalities "     Patient admits that he has been having these symptoms since member last year  Initially thought that he was reflux so she changed her diet, tried PPI and also had EGD with GI which was unremarkable  She denies having any episode where she was extremely diaphoretic or had extreme chest pain although she does Admits that her symptoms are overall progressively getting worse  She also has a history of hypertension  She has a history of hyperlipidemia for which she is not on any statin  Family History: non-contributory  Historical Information   Past Medical History:   Diagnosis Date    Anemia     Last Assessed: 18 June 2013    Concussion     Eczema     Hyperlipidemia     Hypertension     Reactive airway disease     Sleep apnea      Past Surgical History:   Procedure Laterality Date    TOOTH EXTRACTION      UPPER GASTROINTESTINAL ENDOSCOPY       Social History   Social History     Substance and Sexual Activity   Alcohol Use No    Comment: Per [employer]: socially     Social History     Substance and Sexual Activity   Drug Use No     Social History     Tobacco Use   Smoking Status Never Smoker   Smokeless Tobacco Never Used     Family History:   Family History   Problem Relation Age of Onset    Anemia Father     Hypertension Father     Stroke Father         stroke syndrome    Breast cancer Maternal Grandmother     Diabetes Maternal Grandmother     Heart attack Cousin        Review of Systems:  Review of Systems   Constitutional: Negative for activity change, appetite change, diaphoresis, fatigue and fever  HENT: Negative for congestion, sinus pressure, sinus pain and sore throat  Respiratory: Negative for apnea, chest tightness, shortness of breath and stridor  Dyspnea    Cardiovascular: Negative for chest pain, palpitations and leg swelling     Gastrointestinal: Negative for abdominal pain, constipation and diarrhea  Scheduled Meds:  Continuous Infusions:No current facility-administered medications for this visit  PRN Meds:   all current active meds have been reviewed    Allergies   Allergen Reactions    Cat Hair Extract     Celecoxib      Other reaction(s): INEFFECTIVE  Other reaction(s): Other    Cortisone      Other reaction(s): Unknown Reaction       Objective   Vitals: [email or web address] com:    [unfilled] Body mass index is 50 43 kg/m²  Invasive Devices  Report    None                 Physical Exam:  Physical Exam  Vitals reviewed  Constitutional:       General: She is not in acute distress  Appearance: She is well-developed  She is not diaphoretic  Cardiovascular:      Rate and Rhythm: Normal rate and regular rhythm  Heart sounds: Normal heart sounds  No murmur heard  No friction rub  Pulmonary:      Effort: Pulmonary effort is normal  No respiratory distress  Breath sounds: Normal breath sounds  No stridor  Abdominal:      General: Bowel sounds are normal  There is no distension  Palpations: Abdomen is soft  Tenderness: There is no abdominal tenderness  There is no rebound  Psychiatric:         Behavior: Behavior normal          Thought Content: Thought content normal          Lab Results:         Invalid input(s):  EOSPCT       Invalid input(s): LABALBU              No results found for: PHART, GSK7DID, PO2ART, COQ0WMO, Q9TBSUQC, BEART, SOURCE  No components found for: HIV1X2  No results found for: HAV, HEPAIGM, HEPBIGM, HEPBCAB, HBEAG, HEPCAB  No results found for: SPEP, UPEP No results found for: HGBA1C  Lab Results   Component Value Date    CHOL 239 (H) 04/25/2016      Lab Results   Component Value Date    HDL 31 (L) 04/25/2016    No results found for: [address]   Lab Results   Component Value Date    TRIG 180 (H) 04/25/2016     No components found for: PROCAL          Imaging: I have personally reviewed pertinent reports

## 2022-08-22 ENCOUNTER — TELEPHONE (OUTPATIENT)
Dept: CARDIOLOGY CLINIC | Facility: CLINIC | Age: 52
End: 2022-08-22

## 2022-08-22 DIAGNOSIS — R94.39 ABNORMAL STRESS TEST: Primary | ICD-10-CM

## 2022-08-24 ENCOUNTER — HOSPITAL ENCOUNTER (OUTPATIENT)
Facility: HOSPITAL | Age: 52
Setting detail: OBSERVATION
Discharge: HOME | End: 2022-08-26
Attending: EMERGENCY MEDICINE | Admitting: INTERNAL MEDICINE
Payer: COMMERCIAL

## 2022-08-24 ENCOUNTER — APPOINTMENT (OUTPATIENT)
Dept: RADIOLOGY | Facility: HOSPITAL | Age: 52
End: 2022-08-24
Payer: COMMERCIAL

## 2022-08-24 DIAGNOSIS — R07.9 CHEST PAIN: Primary | ICD-10-CM

## 2022-08-24 DIAGNOSIS — E78.5 HYPERLIPIDEMIA, UNSPECIFIED HYPERLIPIDEMIA TYPE: ICD-10-CM

## 2022-08-24 DIAGNOSIS — R73.03 PRE-DIABETES: ICD-10-CM

## 2022-08-24 DIAGNOSIS — E66.01 CLASS 3 SEVERE OBESITY DUE TO EXCESS CALORIES WITH SERIOUS COMORBIDITY AND BODY MASS INDEX (BMI) OF 50.0 TO 59.9 IN ADULT (HCC): ICD-10-CM

## 2022-08-24 DIAGNOSIS — I20.0 UNSTABLE ANGINA (HCC): ICD-10-CM

## 2022-08-24 LAB
BASOPHILS # BLD AUTO: 0.03 THOUSANDS/ΜL (ref 0–0.1)
BASOPHILS NFR BLD AUTO: 0 % (ref 0–1)
EOSINOPHIL # BLD AUTO: 0.12 THOUSAND/ΜL (ref 0–0.61)
EOSINOPHIL NFR BLD AUTO: 1 % (ref 0–6)
ERYTHROCYTE [DISTWIDTH] IN BLOOD BY AUTOMATED COUNT: 12.9 % (ref 11.6–15.1)
HCT VFR BLD AUTO: 35.9 % (ref 34.8–46.1)
HGB BLD-MCNC: 11.5 G/DL (ref 11.5–15.4)
IMM GRANULOCYTES # BLD AUTO: 0.03 THOUSAND/UL (ref 0–0.2)
IMM GRANULOCYTES NFR BLD AUTO: 0 % (ref 0–2)
LYMPHOCYTES # BLD AUTO: 3.44 THOUSANDS/ΜL (ref 0.6–4.47)
LYMPHOCYTES NFR BLD AUTO: 41 % (ref 14–44)
MCH RBC QN AUTO: 28.4 PG (ref 26.8–34.3)
MCHC RBC AUTO-ENTMCNC: 32 G/DL (ref 31.4–37.4)
MCV RBC AUTO: 89 FL (ref 82–98)
MONOCYTES # BLD AUTO: 0.48 THOUSAND/ΜL (ref 0.17–1.22)
MONOCYTES NFR BLD AUTO: 6 % (ref 4–12)
NEUTROPHILS # BLD AUTO: 4.25 THOUSANDS/ΜL (ref 1.85–7.62)
NEUTS SEG NFR BLD AUTO: 52 % (ref 43–75)
NRBC BLD AUTO-RTO: 0 /100 WBCS
PLATELET # BLD AUTO: 252 THOUSANDS/UL (ref 149–390)
PMV BLD AUTO: 10.1 FL (ref 8.9–12.7)
RBC # BLD AUTO: 4.05 MILLION/UL (ref 3.81–5.12)
WBC # BLD AUTO: 8.35 THOUSAND/UL (ref 4.31–10.16)

## 2022-08-24 PROCEDURE — 99285 EMERGENCY DEPT VISIT HI MDM: CPT | Performed by: EMERGENCY MEDICINE

## 2022-08-24 PROCEDURE — 85025 COMPLETE CBC W/AUTO DIFF WBC: CPT

## 2022-08-24 PROCEDURE — 80053 COMPREHEN METABOLIC PANEL: CPT

## 2022-08-24 PROCEDURE — 99285 EMERGENCY DEPT VISIT HI MDM: CPT

## 2022-08-24 PROCEDURE — 93005 ELECTROCARDIOGRAM TRACING: CPT

## 2022-08-24 PROCEDURE — 71045 X-RAY EXAM CHEST 1 VIEW: CPT

## 2022-08-24 PROCEDURE — 36415 COLL VENOUS BLD VENIPUNCTURE: CPT

## 2022-08-24 PROCEDURE — 84484 ASSAY OF TROPONIN QUANT: CPT

## 2022-08-24 RX ORDER — SODIUM CHLORIDE 9 MG/ML
3 INJECTION INTRAVENOUS
Status: DISCONTINUED | OUTPATIENT
Start: 2022-08-24 | End: 2022-08-26 | Stop reason: HOSPADM

## 2022-08-25 PROBLEM — R07.9 CHEST PAIN: Status: ACTIVE | Noted: 2022-08-25

## 2022-08-25 LAB
2HR DELTA HS TROPONIN: 0 NG/L
4HR DELTA HS TROPONIN: 0 NG/L
ALBUMIN SERPL BCP-MCNC: 4 G/DL (ref 3.5–5)
ALP SERPL-CCNC: 79 U/L (ref 46–116)
ALT SERPL W P-5'-P-CCNC: 68 U/L (ref 12–78)
ANION GAP SERPL CALCULATED.3IONS-SCNC: 5 MMOL/L (ref 4–13)
APTT PPP: 28 SECONDS (ref 23–37)
AST SERPL W P-5'-P-CCNC: 47 U/L (ref 5–45)
ATRIAL RATE: 79 BPM
BILIRUB SERPL-MCNC: 0.37 MG/DL (ref 0.2–1)
BUN SERPL-MCNC: 9 MG/DL (ref 5–25)
CALCIUM SERPL-MCNC: 9.2 MG/DL (ref 8.3–10.1)
CARDIAC TROPONIN I PNL SERPL HS: 3 NG/L
CHLORIDE SERPL-SCNC: 107 MMOL/L (ref 96–108)
CHOLEST SERPL-MCNC: 230 MG/DL
CO2 SERPL-SCNC: 29 MMOL/L (ref 21–32)
CREAT SERPL-MCNC: 0.82 MG/DL (ref 0.6–1.3)
ERYTHROCYTE [DISTWIDTH] IN BLOOD BY AUTOMATED COUNT: 13 % (ref 11.6–15.1)
EST. AVERAGE GLUCOSE BLD GHB EST-MCNC: 128 MG/DL
GFR SERPL CREATININE-BSD FRML MDRD: 83 ML/MIN/1.73SQ M
GLUCOSE SERPL-MCNC: 114 MG/DL (ref 65–140)
HBA1C MFR BLD: 6.1 %
HCT VFR BLD AUTO: 36.3 % (ref 34.8–46.1)
HDLC SERPL-MCNC: 31 MG/DL
HGB BLD-MCNC: 11.3 G/DL (ref 11.5–15.4)
INR PPP: 1.02 (ref 0.84–1.19)
LDLC SERPL CALC-MCNC: 158 MG/DL (ref 0–100)
LDLC SERPL DIRECT ASSAY-MCNC: 175 MG/DL (ref 0–100)
MCH RBC QN AUTO: 28.4 PG (ref 26.8–34.3)
MCHC RBC AUTO-ENTMCNC: 31.1 G/DL (ref 31.4–37.4)
MCV RBC AUTO: 91 FL (ref 82–98)
P AXIS: 64 DEGREES
PLATELET # BLD AUTO: 250 THOUSANDS/UL (ref 149–390)
PMV BLD AUTO: 10.3 FL (ref 8.9–12.7)
POTASSIUM SERPL-SCNC: 3.9 MMOL/L (ref 3.5–5.3)
PR INTERVAL: 170 MS
PROT SERPL-MCNC: 7.9 G/DL (ref 6.4–8.4)
PROTHROMBIN TIME: 13.6 SECONDS (ref 11.6–14.5)
QRS AXIS: 50 DEGREES
QRSD INTERVAL: 82 MS
QT INTERVAL: 378 MS
QTC INTERVAL: 433 MS
RBC # BLD AUTO: 3.98 MILLION/UL (ref 3.81–5.12)
SODIUM SERPL-SCNC: 141 MMOL/L (ref 135–147)
T WAVE AXIS: 31 DEGREES
TRIGL SERPL-MCNC: 206 MG/DL
VENTRICULAR RATE: 79 BPM
WBC # BLD AUTO: 7.77 THOUSAND/UL (ref 4.31–10.16)

## 2022-08-25 PROCEDURE — 99220 PR INITIAL OBSERVATION CARE/DAY 70 MINUTES: CPT | Performed by: INTERNAL MEDICINE

## 2022-08-25 PROCEDURE — 85027 COMPLETE CBC AUTOMATED: CPT | Performed by: INTERNAL MEDICINE

## 2022-08-25 PROCEDURE — 83721 ASSAY OF BLOOD LIPOPROTEIN: CPT | Performed by: STUDENT IN AN ORGANIZED HEALTH CARE EDUCATION/TRAINING PROGRAM

## 2022-08-25 PROCEDURE — 99024 POSTOP FOLLOW-UP VISIT: CPT | Performed by: INTERNAL MEDICINE

## 2022-08-25 PROCEDURE — 93010 ELECTROCARDIOGRAM REPORT: CPT | Performed by: INTERNAL MEDICINE

## 2022-08-25 PROCEDURE — 83036 HEMOGLOBIN GLYCOSYLATED A1C: CPT | Performed by: PHYSICIAN ASSISTANT

## 2022-08-25 PROCEDURE — 85610 PROTHROMBIN TIME: CPT | Performed by: PHYSICIAN ASSISTANT

## 2022-08-25 PROCEDURE — 99215 OFFICE O/P EST HI 40 MIN: CPT | Performed by: INTERNAL MEDICINE

## 2022-08-25 PROCEDURE — 84484 ASSAY OF TROPONIN QUANT: CPT

## 2022-08-25 PROCEDURE — 36415 COLL VENOUS BLD VENIPUNCTURE: CPT

## 2022-08-25 PROCEDURE — 85730 THROMBOPLASTIN TIME PARTIAL: CPT | Performed by: PHYSICIAN ASSISTANT

## 2022-08-25 PROCEDURE — 80061 LIPID PANEL: CPT | Performed by: INTERNAL MEDICINE

## 2022-08-25 PROCEDURE — 84484 ASSAY OF TROPONIN QUANT: CPT | Performed by: PHYSICIAN ASSISTANT

## 2022-08-25 RX ORDER — EZETIMIBE 10 MG/1
10 TABLET ORAL DAILY
Status: DISCONTINUED | OUTPATIENT
Start: 2022-08-26 | End: 2022-08-26 | Stop reason: HOSPADM

## 2022-08-25 RX ORDER — AMLODIPINE BESYLATE 5 MG/1
5 TABLET ORAL DAILY
Status: DISCONTINUED | OUTPATIENT
Start: 2022-08-25 | End: 2022-08-26 | Stop reason: HOSPADM

## 2022-08-25 RX ORDER — HEPARIN SODIUM 1000 [USP'U]/ML
4000 INJECTION, SOLUTION INTRAVENOUS; SUBCUTANEOUS
Status: DISCONTINUED | OUTPATIENT
Start: 2022-08-25 | End: 2022-08-26

## 2022-08-25 RX ORDER — PANTOPRAZOLE SODIUM 40 MG/1
40 TABLET, DELAYED RELEASE ORAL
Status: DISCONTINUED | OUTPATIENT
Start: 2022-08-25 | End: 2022-08-26 | Stop reason: HOSPADM

## 2022-08-25 RX ORDER — HEPARIN SODIUM 1000 [USP'U]/ML
4000 INJECTION, SOLUTION INTRAVENOUS; SUBCUTANEOUS ONCE
Status: COMPLETED | OUTPATIENT
Start: 2022-08-25 | End: 2022-08-25

## 2022-08-25 RX ORDER — CALCIUM CARBONATE 200(500)MG
1000 TABLET,CHEWABLE ORAL DAILY PRN
Status: DISCONTINUED | OUTPATIENT
Start: 2022-08-25 | End: 2022-08-26 | Stop reason: HOSPADM

## 2022-08-25 RX ORDER — HEPARIN SODIUM 1000 [USP'U]/ML
2000 INJECTION, SOLUTION INTRAVENOUS; SUBCUTANEOUS
Status: DISCONTINUED | OUTPATIENT
Start: 2022-08-25 | End: 2022-08-26

## 2022-08-25 RX ORDER — HEPARIN SODIUM 10000 [USP'U]/100ML
3-20 INJECTION, SOLUTION INTRAVENOUS
Status: DISCONTINUED | OUTPATIENT
Start: 2022-08-25 | End: 2022-08-26

## 2022-08-25 RX ORDER — ACETAMINOPHEN 325 MG/1
650 TABLET ORAL EVERY 6 HOURS PRN
Status: DISCONTINUED | OUTPATIENT
Start: 2022-08-25 | End: 2022-08-26 | Stop reason: HOSPADM

## 2022-08-25 RX ORDER — ASPIRIN 81 MG/1
162 TABLET, CHEWABLE ORAL DAILY
Status: DISCONTINUED | OUTPATIENT
Start: 2022-08-25 | End: 2022-08-25

## 2022-08-25 RX ORDER — ATORVASTATIN CALCIUM 80 MG/1
80 TABLET, FILM COATED ORAL EVERY EVENING
Status: DISCONTINUED | OUTPATIENT
Start: 2022-08-25 | End: 2022-08-26 | Stop reason: HOSPADM

## 2022-08-25 RX ORDER — ASPIRIN 325 MG
325 TABLET ORAL ONCE
Status: COMPLETED | OUTPATIENT
Start: 2022-08-25 | End: 2022-08-25

## 2022-08-25 RX ORDER — ASPIRIN 81 MG/1
81 TABLET, CHEWABLE ORAL DAILY
Status: DISCONTINUED | OUTPATIENT
Start: 2022-08-26 | End: 2022-08-26

## 2022-08-25 RX ORDER — SODIUM CHLORIDE 9 MG/ML
75 INJECTION, SOLUTION INTRAVENOUS CONTINUOUS
Status: CANCELLED | OUTPATIENT
Start: 2022-08-25 | End: 2022-08-25

## 2022-08-25 RX ORDER — HEPARIN SODIUM 5000 [USP'U]/ML
5000 INJECTION, SOLUTION INTRAVENOUS; SUBCUTANEOUS EVERY 8 HOURS SCHEDULED
Status: CANCELLED | OUTPATIENT
Start: 2022-08-25

## 2022-08-25 RX ADMIN — ATORVASTATIN CALCIUM 80 MG: 80 TABLET, FILM COATED ORAL at 18:09

## 2022-08-25 RX ADMIN — ASPIRIN 325 MG ORAL TABLET 325 MG: 325 PILL ORAL at 18:41

## 2022-08-25 RX ADMIN — HEPARIN SODIUM 4000 UNITS: 1000 INJECTION INTRAVENOUS; SUBCUTANEOUS at 02:56

## 2022-08-25 RX ADMIN — HEPARIN SODIUM 11.1 UNITS/KG/HR: 10000 INJECTION, SOLUTION INTRAVENOUS at 02:57

## 2022-08-25 RX ADMIN — ASPIRIN 81 MG CHEWABLE TABLET 162 MG: 81 TABLET CHEWABLE at 09:50

## 2022-08-25 RX ADMIN — METOPROLOL TARTRATE 25 MG: 25 TABLET, FILM COATED ORAL at 09:51

## 2022-08-25 RX ADMIN — METOPROLOL TARTRATE 25 MG: 25 TABLET, FILM COATED ORAL at 02:56

## 2022-08-25 RX ADMIN — AMLODIPINE BESYLATE 5 MG: 5 TABLET ORAL at 09:50

## 2022-08-25 NOTE — PROGRESS NOTES
Post admit note    Patient presents with chest pain  A cardiac stress test 8/22 revealed perfusion defect  His scheduled for an outpatient cardiac catheterization  She presents with worsening symptoms  Obese, short thick neck, lungs diminished breath sounds bilateral, heart sounds S1-S2 noted, heart sounds are distant, abdomen soft, abdominal obesity noted, awake obeys simple commands, repeat edema, no rash    A/P    · Chest pain rule out ACS, outpatient cardiac stress test with perfusion defect  Continue aspirin statin beta-blocker    Cardiology plans for cardiac catheterization noted  · Obese therapeutic lifestyle modification strongly encouraged, patient sleep study recommended  · Hypertension monitor blood pressures avoid hypotension  · Dyslipidemia continue statin  · GERD continue PPI  · Encouraged incentive spirometry    Discussed with cardiology  Discussed with patient reports she is keeping her spouse updated    [place]

## 2022-08-25 NOTE — ASSESSMENT & PLAN NOTE
· Presents with chest pain and dyspnea on exertion which has been ongoing since November 2021  Seen by outpatient cardiology, ST 8/22 showed large defect was scheduled for [facility] in October but presented for worsening symptoms  · Initial troponin here negative, will trend x3 or to peak  · Monitor on telemetry  · Will start patient on beta-blocker, metoprolol 25 mg b i d   · Previously resistance statin, however will start given patient will likely get cardiac catheterization  · NPO for possible cardiac catheterization tomorrow    · Will consult Cardiology  · Will check echocardiogram  · Check lipids and hemoglobin A1c  · Start statin   · Pt took 162 mg asa at home will give additional 162 now

## 2022-08-25 NOTE — ASSESSMENT & PLAN NOTE
· Reviewed last pressure 149/126  · On amlodipine, losartan, and hydrochlorothiazide  · Will hold hydrochlorothiazide, and losartan in anticipation for possible dye load with left heart catheterization  · Will start patient on metoprolol 25 mg b i d  Joann Soares   · Monitor blood pressures

## 2022-08-25 NOTE — H&P
[facility] [address] [place] [address] 1970, 47 y o  female MRN: [id number]  Unit/Bed#: ED 26 Encounter: [id number]  Primary Care Provider: Nataly Magana DO   Date and time admitted to hospital: 8/24/2022 10:50 PM    * Chest pain  Assessment & Plan  · Presents with chest pain and dyspnea on exertion which has been ongoing since November 2021  Seen by outpatient cardiology,  8/22 showed large defect was scheduled for [facility] in October but presented for worsening symptoms  · Initial troponin here negative, will trend x3 or to peak  · Monitor on telemetry  · Will start patient on beta-blocker, metoprolol 25 mg b i d   · NPO for possible cardiac catheterization tomorrow  · Consult cardiology   · Check echo   · Check lipids and hemoglobin A1c  · Start statin   · Pt took 162 mg asa at home will give additional 162 now   · Start heparin gtt as concern for unstable angina     Gastroesophageal reflux disease without esophagitis  Assessment & Plan  · Recent EGD on 6/27/22 showing mild gastric erythema  · Patient on ppi   · Continue while here     Hyperlipidemia  Assessment & Plan  · No recent lipids  · Check a m  Lipids  · Start patient on high-intensity statin    Essential hypertension  Assessment & Plan  · Reviewed last pressure 149/126  · On amlodipine, losartan, and hydrochlorothiazide  · Will hold hydrochlorothiazide, and losartan in anticipation for possible dye load with left heart catheterization  · Will start patient on metoprolol 25 mg b i d  Ravi Mintyrone · Monitor blood pressures      VTE Pharmacologic Prophylaxis: VTE Score: 4 Moderate Risk (Score 3-4) - Pharmacological DVT Prophylaxis Ordered: heparin drip  Code Status: No Order full code   Discussion with family: Attempted to update  () via phone  Left voicemail       Anticipated Length of Stay: Patient will be admitted on an observation basis with an anticipated length of stay of less than 2 midnights secondary to chest pain   Total Time for Visit, including Counseling / Coordination of Care: 60 minutes Greater than 50% of this total time spent on direct patient counseling and coordination of care  Chief Complaint:  Chest pain    History of Present Illness:  Michael Newton is a 47 y o  female with a PMH of obesity, sleep apnea, hypertension, hyperlipidemia, GERD who presents with chest pain  Patient presents emergency department for worsening of her chest pain/shortness of breath which has been present since in November 2021  Patient states that her symptoms acutely worsened over the past couple of days in which she has been describing chest pressure which she feels is like a belt around her chest which worsens with exertion  She woke up earlier this morning experiencing pressure and shortness of breath out of proportion to her recent baseline cp and sob  She also states that she has had worsening shortness of breath to the point where she is unable to walk approximately 2 blocks which is the distant that she walks to work  She denies any associated symptoms such as arm pain, jaw pain , or severe diaphoresis or fatigue  Patient has seen outpatient cardiology in which he had significant abnormalities on recent stress testing and was set to undergo left heart catheterization on October 3, 2022  Patient is not a smoker and never has been  She does not drink alcohol  States that cousin, and on both have heart disease  Was recommend to start statin therapy, however patient states that she has more homeopathic in started plant based diet  Review of Systems:  Review of Systems   Constitutional: Negative for chills, fatigue, fever and unexpected weight change  Respiratory: Positive for chest tightness and shortness of breath  Negative for cough  Cardiovascular: Negative for chest pain and palpitations  Gastrointestinal: Negative for abdominal pain, diarrhea, nausea and vomiting  Genitourinary: Negative for decreased urine volume, dysuria, frequency and urgency  Musculoskeletal: Negative for arthralgias  Neurological: Negative for dizziness, syncope, light-headedness and headaches  All other systems reviewed and are negative  Past Medical and Surgical History:   Past Medical History:   Diagnosis Date    Anemia     Last Assessed: 18 June 2013    Concussion     Eczema     Hyperlipidemia     Hypertension     Reactive airway disease     Sleep apnea        Past Surgical History:   Procedure Laterality Date    TOOTH EXTRACTION      UPPER GASTROINTESTINAL ENDOSCOPY         Meds/Allergies:  Prior to Admission medications    Medication Sig Start Date End Date Taking? Authorizing Provider   albuterol (Ventolin HFA) 90 mcg/act inhaler Inhale 2 puffs every 6 (six) hours as needed for wheezing  Patient not taking: Reported on 8/19/2022 3/21/22   Hsin Jose M Mary Query, DO   amLODIPine (NORVASC) 5 mg tablet Take 1 tablet (5 mg total) by mouth daily 3/21/22 8/19/22  Hsin Jose M Virginia, DO   b complex vitamins tablet Take 1 tablet by mouth daily  Patient not taking: Reported on 8/19/2022    Historical Provider, MD   Cannabidiol 100 MG/ML SOLN Take by mouth daily as needed    Historical Provider, MD   fluticasone (FLONASE) 50 mcg/act nasal spray 2 sprays into each nostril daily 3/24/20   Brianna Camacho MD   Ginger Root POWD by Does not apply route    Historical Provider, MD   hydrochlorothiazide (HYDRODIURIL) 25 mg tablet Take 1 tablet (25 mg total) by mouth daily 3/21/22 8/19/22  Hsin Jose M Mary Query, DO   losartan (COZAAR) 50 mg tablet Take 1 tablet (50 mg total) by mouth daily 3/21/22 8/19/22  Hsin Jose M Mary Query, DO   Omega-3 Fatty Acids (OMEGA 3 500 PO) Take by mouth    Historical Provider, MD   pantoprazole (PROTONIX) 40 mg tablet Take 1 tablet (40 mg total) by mouth daily 5/5/22   Hsin Jose M Virginia, DO     I have reviewed home medications with patient personally  Allergies:    Allergies   Allergen Reactions    Cat Hair Extract     Celecoxib      Other reaction(s): INEFFECTIVE  Other reaction(s): Other    Cortisone      Other reaction(s): Unknown Reaction       Social History:  Marital Status: /Civil Union   Occupation: unknown   Patient Pre-hospital Living Situation: Home  Patient Pre-hospital Level of Mobility: walks  Patient Pre-hospital Diet Restrictions: npo   Substance Use History:   Social History     Substance and Sexual Activity   Alcohol Use No    Comment: Per Allscripts: socially     Social History     Tobacco Use   Smoking Status Never Smoker   Smokeless Tobacco Never Used     Social History     Substance and Sexual Activity   Drug Use No       Family History:  Family History   Problem Relation Age of Onset    Anemia Father     Hypertension Father     Stroke Father         stroke syndrome    Breast cancer Maternal Grandmother     Diabetes Maternal Grandmother     Heart attack Cousin        Physical Exam:     Vitals:   Blood Pressure: 112/75 (08/25/22 0030)  Pulse: 82 (08/25/22 0030)  Temperature: 98 °F (36 7 °C) (08/24/22 2214)  Temp Source: Oral (08/24/22 2214)  Respirations: 18 (08/24/22 2212)  SpO2: 100 % (08/25/22 0030)    Physical Exam  Vitals and nursing note reviewed  Constitutional:       General: She is not in acute distress  Appearance: She is obese  HENT:      Head: Normocephalic and atraumatic  Mouth/Throat:      Mouth: Mucous membranes are moist       Pharynx: Oropharynx is clear  No oropharyngeal exudate  Eyes:      General:         Right eye: No discharge  Left eye: No discharge  Conjunctiva/sclera: Conjunctivae normal       Pupils: Pupils are equal, round, and reactive to light  Cardiovascular:      Rate and Rhythm: Normal rate and regular rhythm  Pulses: Normal pulses  Heart sounds: Normal heart sounds  No murmur heard  Pulmonary:      Effort: Pulmonary effort is normal  No respiratory distress  Breath sounds: Normal breath sounds   No wheezing or rales  Abdominal:      General: Abdomen is flat  There is no distension  Palpations: Abdomen is soft  Tenderness: There is no abdominal tenderness  Musculoskeletal:         General: Normal range of motion  Cervical back: Neck supple  No muscular tenderness  Right lower leg: Edema present  Left lower leg: Edema present  Skin:     General: Skin is warm and dry  Capillary Refill: Capillary refill takes less than 2 seconds  Coloration: Skin is not jaundiced  Neurological:      General: No focal deficit present  Mental Status: She is alert and oriented to person, place, and time  Cranial Nerves: No cranial nerve deficit  Psychiatric:         Mood and Affect: Mood normal           Additional Data:     Lab Results:  Results from last 7 days   Lab Units 08/24/22  2326   WBC Thousand/uL 8 35   HEMOGLOBIN g/dL 11 5   HEMATOCRIT % 35 9   PLATELETS Thousands/uL 252   NEUTROS PCT % 52   LYMPHS PCT % 41   MONOS PCT % 6   EOS PCT % 1     Results from last 7 days   Lab Units 08/24/22  2326   SODIUM mmol/L 141   POTASSIUM mmol/L 3 9   CHLORIDE mmol/L 107   CO2 mmol/L 29   BUN mg/dL 9   CREATININE mg/dL 0 82   ANION GAP mmol/L 5   CALCIUM mg/dL 9 2   ALBUMIN g/dL 4 0   TOTAL BILIRUBIN mg/dL 0 37   ALK PHOS U/L 79   ALT U/L 68   AST U/L 47*   GLUCOSE RANDOM mg/dL 114                       Imaging: Reviewed radiology reports from this admission including: chest xray  X-ray chest 1 view portable    (Results Pending)       EKG and Other Studies Reviewed on Admission:   · EKG: NSR  HR Ventricular rate of 79  No ST changes concerning for ischemia at this time  QTC within normal limits       ** Please Note: This note has been constructed using a voice recognition system   **

## 2022-08-25 NOTE — CONSULTS
Consultation - General Cardiology Team 2  Reetha L Dubinkin 47 y o  female MRN: [id number]  Unit/Bed#: [facility] 431-01 Encounter: [id number]            Inpatient consult to Cardiology     Performed by  Aislinn Hernandez DO     Authorized by Janelle Cunningham PA-C            PCP: Swapnil Alcala DO   Outpatient Cardiologist: Dr Sd Fitzpatrick    History of Present Illness   Physician Requesting Consult: Jamie Corcoran MD  Reason for Consult / Principal Problem: Chest pain    HPI: Antoine Carpenter is a 47y o  year old female with a history of obesity, sleep apnea, hypertension, hyperlipidemia, GERD who presents with chest pain  Patient presents emergency department for worsening of her chest pain/shortness of breath which has been present since in November 2021  Patient states that her symptoms acutely worsened over the past couple of days in which she has been describing chest pressure which she feels is like a belt around her chest which worsens with exertion  She woke up earlier this morning experiencing pressure and shortness of breath out of proportion to her recent baseline cp and sob  She also states that she has had worsening shortness of breath to the point where she is unable to walk approximately 2 blocks which is the distant that she walks to work  She denies any associated symptoms such as arm pain, jaw pain , or severe diaphoresis or fatigue  Patient has seen outpatient cardiology in which he had significant abnormalities on recent stress testing and was set to undergo left heart catheterization on October 3, 2022  Patient is not a smoker and never has been  She does not drink alcohol  States that cousin, and on both have heart disease  Was recommend to start statin therapy, however patient states that she has more homeopathic in started plant based diet  Assessment and Plan:   1   Angina   · Patient experiencing CP on exertion that she does not have at rest but has progressed since the pain initially began in November of 2021  · Troponin's and EKG negative in the ED   · Previous stress test done in 2022 showed Left ventricular perfusion deficits which is large in size  Large area of ischemia of anterior and inferior inferoseptal walls   · Was schedule for a [facility] in October of 2022 but the pain brought her to the ED before   · Plan will be for her to get a CATH today due to worsening of symptoms - pt has remained NPO since yesterday - will update primary with plan   2  HLD  · Was recommended to take a statin at home but patient declined saying that she would rather treat it homeopathically   · Patient is now on Statin 80mg  · ASA started  3  HTN  · Home regimen of Norvasc 5mg, HCTZ 25 mg, and Losartan 50mg  · Currently on amlodipine 5, metoprolol tartrate 25mg BID,     Subjective:   Pt seen and examined lying in bed  Pt describes worsening chest pain and tightness over the course of the last couple months  Pt states that she initially was only getting the pressure with exertion but now it starts as soon as when she wakes up in the morning  Pt states that it is definitely exacerbated by exertion but she will notice it now at rest sometimes  She states that it was previously a pressure as if someone was sitting on her chest but it now includes a band like tightening sensation around her lower chest  She also says the pain will also happen in her left upper to middle quadrant of her abdomen  She states that with this pressure comes difficulty breathing  She states that she often finds it hard to catch her breath when doing minimal effort tasks and has the sensation that she has to remind herself to breathe at times  Review of Systems   Constitutional: Negative for chills, diaphoresis and fever  Respiratory: Positive for chest tightness and shortness of breath  Negative for cough  Cardiovascular: Positive for chest pain and leg swelling  Negative for palpitations     Gastrointestinal: Positive for nausea  Negative for constipation, diarrhea and vomiting  Musculoskeletal: Negative for neck pain  Neurological: Positive for dizziness and light-headedness  Negative for seizures, syncope and headaches  ROS as noted above         Historical Information   Past Medical History:   Diagnosis Date    Anemia     Last Assessed: 18 June 2013    Concussion     Eczema     Hyperlipidemia     Hypertension     Reactive airway disease     Sleep apnea      Past Surgical History:   Procedure Laterality Date    TOOTH EXTRACTION      UPPER GASTROINTESTINAL ENDOSCOPY       Social History     Substance and Sexual Activity   Alcohol Use No    Comment: Per Allscripts: socially     Social History     Substance and Sexual Activity   Drug Use No     Social History     Tobacco Use   Smoking Status Never Smoker   Smokeless Tobacco Never Used     Family History: non-contributory    Meds/Allergies   Hospital Medications:   Current Facility-Administered Medications   Medication Dose Route Frequency    acetaminophen (TYLENOL) tablet 650 mg  650 mg Oral Q6H PRN    amLODIPine (NORVASC) tablet 5 mg  5 mg Oral Daily    aspirin chewable tablet 162 mg  162 mg Oral Daily    [START ON 8/26/2022] aspirin chewable tablet 81 mg  81 mg Oral Daily    atorvastatin (LIPITOR) tablet 80 mg  80 mg Oral QPM    calcium carbonate (TUMS) chewable tablet 1,000 mg  1,000 mg Oral Daily PRN    heparin (porcine) 25,000 units in 0 45% NaCl 250 mL infusion (premix)  3-20 Units/kg/hr (Order-Specific) Intravenous Titrated    heparin (porcine) injection 2,000 Units  2,000 Units Intravenous Q1H PRN    heparin (porcine) injection 4,000 Units  4,000 Units Intravenous Q1H PRN    metoprolol tartrate (LOPRESSOR) tablet 25 mg  25 mg Oral Q12H [address]    pantoprazole (PROTONIX) EC tablet 40 mg  40 mg Oral Early Morning    sodium chloride (PF) 0 9 % injection 3 mL  3 mL Intravenous Q1H PRN     Home Medications:   Medications Prior to Admission   Medication    albuterol (Ventolin HFA) 90 mcg/act inhaler    amLODIPine (NORVASC) 5 mg tablet    b complex vitamins tablet    Cannabidiol 100 MG/ML SOLN    fluticasone (FLONASE) 50 mcg/act nasal spray    Shannan Root POWD    hydrochlorothiazide (HYDRODIURIL) 25 mg tablet    losartan (COZAAR) 50 mg tablet    Omega-3 Fatty Acids (OMEGA 3 500 PO)    pantoprazole (PROTONIX) 40 mg tablet       Allergies   Allergen Reactions    Cat Hair Extract     Celecoxib      Other reaction(s): INEFFECTIVE  Other reaction(s): Other    Cortisone      Other reaction(s): Unknown Reaction       Objective   Vitals: Blood pressure 112/76, pulse 58, temperature 98 1 °F (36 7 °C), resp  rate 16, last menstrual period 08/01/2021, SpO2 99 %, not currently breastfeeding  Orthostatic Blood Pressures    Flowsheet Row Most Recent Value   Blood Pressure 112/76 filed at 08/25/2022 0945            Invasive Devices  Report    Peripheral Intravenous Line  Duration           Peripheral IV 08/24/22 Left Antecubital <1 day    Peripheral IV 08/25/22 Right Hand <1 day                Physical Exam  Vitals reviewed  Constitutional:       General: She is not in acute distress  Appearance: Normal appearance  She is not ill-appearing  HENT:      Head: Normocephalic and atraumatic  Nose: Nose normal       Mouth/Throat:      Mouth: Mucous membranes are moist    Eyes:      General: No scleral icterus  Conjunctiva/sclera: Conjunctivae normal    Cardiovascular:      Rate and Rhythm: Normal rate and regular rhythm  Pulses: Normal pulses  Heart sounds: Normal heart sounds  No murmur heard  No friction rub  No gallop  Pulmonary:      Effort: Pulmonary effort is normal       Breath sounds: Normal breath sounds  No wheezing, rhonchi or rales  Abdominal:      General: Abdomen is flat  Bowel sounds are normal       Palpations: Abdomen is soft  There is no mass  Tenderness: There is no abdominal tenderness     Musculoskeletal: General: No swelling  Cervical back: Neck supple  Right lower leg: Edema present  Left lower leg: Edema present  Skin:     General: Skin is warm and dry  Neurological:      General: No focal deficit present  Mental Status: She is alert and oriented to person, place, and time  Psychiatric:         Mood and Affect: Mood normal          Behavior: Behavior normal            Lab Results:   I have personally reviewed pertinent lab results      CBC with diff:   Results from last 7 days   Lab Units 08/25/22  0531   WBC Thousand/uL 7 77   RBC Million/uL 3 98   HEMOGLOBIN g/dL 11 3*   HEMATOCRIT % 36 3   MCV fL 91   MCH pg 28 4   MCHC g/dL 31 1*   RDW % 13 0   MPV fL 10 3   PLATELETS Thousands/uL 250     CMP:   Results from last 7 days   Lab Units 08/24/22  2326   SODIUM mmol/L 141   CHLORIDE mmol/L 107   CO2 mmol/L 29   BUN mg/dL 9   CREATININE mg/dL 0 82   CALCIUM mg/dL 9 2   AST U/L 47*   ALT U/L 68   ALK PHOS U/L 79   EGFR ml/min/1 73sq m 83     HS Troponin:   0   Lab Value Date/Time    HSTNI0 3 08/24/2022 2326    HSTNI2 3 08/25/2022 0148    HSTNI4 3 08/25/2022 0535    HSTNI4 2 12/13/2021 1815     BNP:   Results from last 7 days   Lab Units 08/24/22  2326   POTASSIUM mmol/L 3 9   CHLORIDE mmol/L 107   CO2 mmol/L 29   BUN mg/dL 9   CREATININE mg/dL 0 82   CALCIUM mg/dL 9 2   EGFR ml/min/1 73sq m 83     Coags:   Results from last 7 days   Lab Units 08/25/22  0152   PTT seconds 28   INR  1 02     TSH:     Magnesium:     Lipid Profile:   Results from last 7 days   Lab Units 08/25/22  0531   HDL mg/dL 31*   LDL CALC mg/dL 158*   TRIGLYCERIDES mg/dL 206*         Results from last 7 days   Lab Units 08/24/22  2326   POTASSIUM mmol/L 3 9   CO2 mmol/L 29   CHLORIDE mmol/L 107   BUN mg/dL 9   CREATININE mg/dL 0 82     Results from last 7 days   Lab Units 08/25/22  0531 08/24/22  2326   HEMOGLOBIN g/dL 11 3* 11 5   HEMATOCRIT % 36 3 35 9   PLATELETS Thousands/uL 250 252     Results from last 7 days   Lab Units 08/25/22  0152   PTT seconds 28     Results from last 7 days   Lab Units 08/25/22  0531   HDL mg/dL 31*   LDL CALC mg/dL 158*   TRIGLYCERIDES mg/dL 206*         Imaging: I have personally reviewed pertinent reports  Telemetry:   Not currently on tele    EKG:   Date: 8/24/22  Interpretation: Normal sinus rhythm  Low voltage QRS  Borderline ECG  When compared with ECG of 28-MAR-2022 09:52,      Previous STRESS TEST:  Results for orders placed during the hospital encounter of 08/01/22    NM myocardial perfusion spect (rx stress and/or rest)    Interpretation Summary    Stress ECG: No ST deviation is noted  There were no arrhythmias during recovery    The ECG was negative for ischemia  The stress ECG is negative for ischemia after vasodilation and low level exercise    Perfusion: There is a left ventricular perfusion defect that is large in size with moderate reduction in uptake present in the entire inferior and inferoseptal location(s) that is reversible  There is a left ventricular perfusion defect that is medium to large in size with mild reduction in uptake present in the entire anterior location(s) that is reversible  The defect appears to be caused by breast attenuation  The possibility of artifact cannot be excluded    Stress Function: Left ventricular function post-stress is normal  Post-stress ejection fraction is 66 %    Stress Combined Conclusion: Left ventricular perfusion is abnormal  Findings are consistent with ischemia  There is large area of ischemia of anterior, inferior and inferoseptal walls  She is an obese woman who could not be proned  Contribution of breast and diaphragmatic artifacts could not be excluded as left ventricular function is normal with no diagnostic wall motion abnormalities  Previous Cath/PCI:  No results found for this or any previous visit  ECHO:  Pending read       HOLTER  No results found for this or any previous visit          VTE Prophylaxis: Heparin       Case discussed and reviewed with Dr Leslie Wood and is pending their agreement of the assessment and plan  Thank you for involving us in the care of your patient  Tj Cruz, DO  PGY-1  [facility] Cardiology - [facility]    ==========================================================================================    Counseling / Coordination of Care  Total floor / unit time spent today 1 hour minutes  Greater than 50% of total time was spent with the patient and / or family counseling and / or coordination of care  A description of the counseling / coordination of care:          Epic/ Allscripts/Care Everywhere records reviewed:     ** Please Note: Fluency DirectDictation voice to text software may have been used in the creation of this document   **

## 2022-08-25 NOTE — NURSING NOTE
Multiple RNs attempted throughout day to get blood work  Attempted x2 with ultrasound, still unsuccessful  Dr Marcelo Rahman w/[facility] notified and told that titrating heparin gtt would not be possible without PTTs  Dr Marcelo Rahman verbalized understanding of this  Heparin remains infusing at initial rate  No new orders at this time

## 2022-08-25 NOTE — ED NOTES
Pt approved to be transported upstairs per P[facility]  They will arrange tele       Eusebia Barton RN  08/25/22 5441

## 2022-08-25 NOTE — ED PROVIDER NOTES
History  Chief Complaint   Patient presents with    Heart Problem     Recent stress test, showed abnormalities  Scheduled for cath tomorrow  Having chest discomfort  Took ASA, felt some relief  Patient is a 52-year-old female with a significant past medical history of hypertension, hyperlipidemia, currently presenting with a chief complaint of chest pain  Of notes she has also been following cardiology secondary to ongoing dyspnea over the last several months and had an abnormal nuclear stress test for which she is scheduled to have a follow up left heart cath in and in October of this year  She states that her symptoms have been progressively worsening, predominantly over the last day  She describes some increased shortness of breath without pain with breathing, as well as a central, substernal, non-radiating chest pain described as a pressure type sensation  She does not have any radiation to her back, abdomen, or jaw  She does not have any associated nausea or diaphoresis  She denies orthopnea, weight gain, increased LE swelling  She denies any changes in sensation, or focal weakness  She took 325 mg of aspirin for her symptoms with moderate relief  She went to an urgent care center and was instructed to come to the emergency department  She denies any other acute complaints at this time  Prior to Admission Medications   Prescriptions Last Dose Informant Patient Reported? Taking?    Cannabidiol 100 MG/ML SOLN  Self Yes No   Sig: Take by mouth daily as needed   Ginger Root POWD  Self Yes No   Sig: by Does not apply route   Omega-3 Fatty Acids (OMEGA 3 500 PO)  Self Yes No   Sig: Take by mouth   albuterol (Ventolin HFA) 90 mcg/act inhaler  Self No No   Sig: Inhale 2 puffs every 6 (six) hours as needed for wheezing   Patient not taking: Reported on 8/19/2022   amLODIPine (NORVASC) 5 mg tablet   No No   Sig: Take 1 tablet (5 mg total) by mouth daily   b complex vitamins tablet   Yes No   Sig: Take 1 tablet by mouth daily   Patient not taking: Reported on 2022   fluticasone (FLONASE) 50 mcg/act nasal spray  Self No No   Si sprays into each nostril daily   hydrochlorothiazide (HYDRODIURIL) 25 mg tablet  Self No No   Sig: Take 1 tablet (25 mg total) by mouth daily   losartan (COZAAR) 50 mg tablet  Self No No   Sig: Take 1 tablet (50 mg total) by mouth daily   pantoprazole (PROTONIX) 40 mg tablet  Self No No   Sig: Take 1 tablet (40 mg total) by mouth daily      Facility-Administered Medications: None       Past Medical History:   Diagnosis Date    Anemia     Last Assessed: 2013    Concussion     Eczema     Hyperlipidemia     Hypertension     Reactive airway disease     Sleep apnea        Past Surgical History:   Procedure Laterality Date    TOOTH EXTRACTION      UPPER GASTROINTESTINAL ENDOSCOPY         Family History   Problem Relation Age of Onset    Anemia Father     Hypertension Father     Stroke Father         stroke syndrome    Breast cancer Maternal Grandmother     Diabetes Maternal Grandmother     Heart attack Cousin      I have reviewed and agree with the history as documented  E-Cigarette/Vaping    E-Cigarette Use Never User      E-Cigarette/Vaping Substances     Social History     Tobacco Use    Smoking status: Never Smoker    Smokeless tobacco: Never Used   Vaping Use    Vaping Use: Never used   Substance Use Topics    Alcohol use: No     Comment: Per [employer]: socially    Drug use: No        Review of Systems   Constitutional: Negative for chills and fever  HENT: Negative for sore throat  Eyes: Negative for visual disturbance  Respiratory: Positive for shortness of breath  Negative for cough  Cardiovascular: Positive for chest pain  Gastrointestinal: Negative for abdominal pain, constipation, diarrhea, nausea and vomiting  Genitourinary: Negative for dysuria  Musculoskeletal: Negative for back pain and neck pain  Skin: Negative for rash  Neurological: Negative for dizziness, syncope, light-headedness and headaches  Psychiatric/Behavioral: Negative for agitation  All other systems reviewed and are negative  Physical Exam  ED Triage Vitals   Temperature Pulse Respirations Blood Pressure SpO2   08/24/22 2214 08/24/22 2212 08/24/22 2212 08/24/22 2212 08/24/22 2212   98 °F (36 7 °C) 79 18 (!) 149/126 98 %      Temp Source Heart Rate Source Patient Position - Orthostatic VS BP Location FiO2 (%)   08/24/22 2214 08/25/22 2003 08/25/22 2003 08/25/22 2003 --   Oral Monitor Sitting Left arm       Pain Score       08/25/22 0400       No Pain             Orthostatic Vital Signs  Vitals:    08/25/22 1223 08/25/22 1518 08/25/22 2003 08/25/22 2026   BP: 111/73 125/79 124/79 126/80   Pulse: 58 70 76 63   Patient Position - Orthostatic VS:   Sitting        Physical Exam  Vitals and nursing note reviewed  Constitutional:       General: She is not in acute distress  Appearance: Normal appearance  She is obese  She is not ill-appearing or toxic-appearing  HENT:      Head: Normocephalic and atraumatic  Right Ear: External ear normal       Left Ear: External ear normal       Nose: Nose normal    Eyes:      General: No scleral icterus  Right eye: No discharge  Left eye: No discharge  Extraocular Movements: Extraocular movements intact  Conjunctiva/sclera: Conjunctivae normal    Cardiovascular:      Rate and Rhythm: Normal rate and regular rhythm  Heart sounds: Normal heart sounds  No murmur heard  No friction rub  No gallop  Pulmonary:      Effort: Pulmonary effort is normal  No respiratory distress  Breath sounds: Normal breath sounds  Abdominal:      General: Abdomen is flat  There is no distension  Palpations: Abdomen is soft  There is no mass  Tenderness: There is no abdominal tenderness  Genitourinary:     Comments: Deferred  Musculoskeletal:         General: Normal range of motion  Cervical back: Normal range of motion  Right lower leg: Edema present  Left lower leg: Edema present  Skin:     General: Skin is warm and dry  Neurological:      General: No focal deficit present  Mental Status: She is alert     Psychiatric:         Mood and Affect: Mood normal          ED Medications  Medications   sodium chloride (PF) 0 9 % injection 3 mL (has no administration in time range)   amLODIPine (NORVASC) tablet 5 mg (5 mg Oral Given 8/25/22 0950)   pantoprazole (PROTONIX) EC tablet 40 mg (40 mg Oral Not Given 8/25/22 0542)   atorvastatin (LIPITOR) tablet 80 mg (80 mg Oral Given 8/25/22 1809)   acetaminophen (TYLENOL) tablet 650 mg (has no administration in time range)   calcium carbonate (TUMS) chewable tablet 1,000 mg (has no administration in time range)   aspirin chewable tablet 81 mg (has no administration in time range)   heparin (porcine) 25,000 units in 0 45% NaCl 250 mL infusion (premix) (3 Units/kg/hr × 90 kg (Order-Specific) Intravenous Not Given 8/25/22 0305)   heparin (porcine) injection 4,000 Units (has no administration in time range)   heparin (porcine) injection 2,000 Units (has no administration in time range)   ticagrelor (BRILINTA) tablet 180 mg (has no administration in time range)   heparin (porcine) injection 4,000 Units (4,000 Units Intravenous Given 8/25/22 0256)   aspirin tablet 325 mg (325 mg Oral Given 8/25/22 1841)       Diagnostic Studies  Results Reviewed     Procedure Component Value Units Date/Time    Hemoglobin A1C w/ EAG Estimation [[id number]]  (Abnormal) Collected: 08/25/22 0531    Lab Status: Final result Specimen: Blood from Arm, Left Updated: 08/25/22 0752     Hemoglobin A1C 6 1 %       mg/dl     APTT [[id number]]     Lab Status: No result Specimen: Blood     HS Troponin I 4hr [[id number]]  (Normal) Collected: 08/25/22 0535    Lab Status: Final result Specimen: Blood from Arm, Left Updated: 08/25/22 0623     hs TnI 4hr 3 ng/L      Delta 4hr hsTnI 0 ng/L     HS Troponin I 2hr [[id number]]  (Normal) Collected: 08/25/22 0148    Lab Status: Final result Specimen: Blood from Arm, Left Updated: 08/25/22 0240     hs TnI 2hr 3 ng/L      Delta 2hr hsTnI 0 ng/L     Protime-INR [[id number]]  (Normal) Collected: 08/25/22 0152    Lab Status: Final result Specimen: Blood from Arm, Left Updated: 08/25/22 0218     Protime 13 6 seconds      INR 1 02    APTT [[id number]]  (Normal) Collected: 08/25/22 0152    Lab Status: Final result Specimen: Blood from Arm, Left Updated: 08/25/22 0218     PTT 28 seconds     HS Troponin 0hr (reflex protocol) [[id number]]  (Normal) Collected: 08/24/22 2326    Lab Status: Final result Specimen: Blood from Arm, Left Updated: 08/25/22 0025     hs TnI 0hr 3 ng/L     Comprehensive metabolic panel [[id number]]  (Abnormal) Collected: 08/24/22 2326    Lab Status: Final result Specimen: Blood from Arm, Left Updated: 08/25/22 0008     Sodium 141 mmol/L      Potassium 3 9 mmol/L      Chloride 107 mmol/L      CO2 29 mmol/L      ANION GAP 5 mmol/L      BUN 9 mg/dL      Creatinine 0 82 mg/dL      Glucose 114 mg/dL      Calcium 9 2 mg/dL      AST 47 U/L      ALT 68 U/L      Alkaline Phosphatase 79 U/L      Total Protein 7 9 g/dL      Albumin 4 0 g/dL      Total Bilirubin 0 37 mg/dL      eGFR 83 ml/min/1 73sq m     Narrative:      [place] guidelines for Chronic Kidney Disease (CKD):     Stage 1 with normal or high GFR (GFR > 90 mL/min/1 73 square meters)    Stage 2 Mild CKD (GFR = 60-89 mL/min/1 73 square meters)    Stage 3A Moderate CKD (GFR = 45-59 mL/min/1 73 square meters)    Stage 3B Moderate CKD (GFR = 30-44 mL/min/1 73 square meters)    Stage 4 Severe CKD (GFR = 15-29 mL/min/1 73 square meters)    Stage 5 End Stage CKD (GFR <15 mL/min/1 73 square meters)  Note: GFR calculation is accurate only with a steady state creatinine    CBC and differential [[id number]] Collected: 08/24/22 1009    Lab Status: Final result Specimen: Blood from Arm, Left Updated: 08/24/22 2339     WBC 8 35 Thousand/uL      RBC 4 05 Million/uL      Hemoglobin 11 5 g/dL      Hematocrit 35 9 %      MCV 89 fL      MCH 28 4 pg      MCHC 32 0 g/dL      RDW 12 9 %      MPV 10 1 fL      Platelets 048 Thousands/uL      nRBC 0 /100 WBCs      Neutrophils Relative 52 %      Immat GRANS % 0 %      Lymphocytes Relative 41 %      Monocytes Relative 6 %      Eosinophils Relative 1 %      Basophils Relative 0 %      Neutrophils Absolute 4 25 Thousands/µL      Immature Grans Absolute 0 03 Thousand/uL      Lymphocytes Absolute 3 44 Thousands/µL      Monocytes Absolute 0 48 Thousand/µL      Eosinophils Absolute 0 12 Thousand/µL      Basophils Absolute 0 03 Thousands/µL                  X-ray chest 1 view portable   Final Result by Kaylah Combs MD (08/25 8000)      No acute cardiopulmonary disease                    Workstation performed: [id number]               Procedures  ECG 12 Lead Documentation Only    Date/Time: 8/24/2022 11:25 PM  Performed by: Dami Mckenna DO  Authorized by: Dami Mckenna DO     Indications / Diagnosis:  Chest pain  ECG reviewed by me, the ED Provider: yes    Patient location:  ED  Previous ECG:     Previous ECG:  Compared to current    Similarity:  No change  Interpretation:     Interpretation: normal    Rate:     ECG rate:  79    ECG rate assessment: normal    Rhythm:     Rhythm: sinus rhythm    Ectopy:     Ectopy: none    QRS:     QRS axis:  Normal  Conduction:     Conduction: normal    ST segments:     ST segments:  Normal  T waves:     T waves: normal            ED Course             HEART Risk Score    Flowsheet Row Most Recent Value   Heart Score Risk Calculator    History 2 Filed at: 08/25/2022 0046   ECG 0 Filed at: 08/25/2022 0046   Age 1 Filed at: 08/25/2022 0046   Risk Factors 1 Filed at: 08/25/2022 0046   Troponin 0 Filed at: 08/25/2022 0046   HEART Score 4 Filed at: 08/25/2022 6000 MDM  Number of Diagnoses or Management Options  Chest pain: new and requires workup  Diagnosis management comments: Patient is a 47year old female who presents with chest pain  No evidence of volume overload or shock on exam  ECG without signs of active ischemia  ECG without evidence of STEMI  Low suspicion for acute PE, pneumothorax (not seen on CXR), thoracic aortic dissection, cardiac effusion / tamponade  Overall, ACS is being considered given higher risk features, history & physical  HEART score: 4 (see above)  Plan: cardiac monitor, labs, ECG, troponins, CXR, ASA taken prior to arrival, reassess, plan to admit for risk stratification    Patient labs largely unremarkable  Troponin negative, will delta  ECG without signs of active ischemia or STEMI  Chest x-ray without any acute cardiopulmonary disease  Given patient significant history of abnormal nuclear stress test, as well as presentation today, suspect that her presentation is most consistent with stable/unstable angina  Recommend admission for further cardiac work up  Discussed this plan with patient who seems to understand and is agreeable  All questions answered  Patient admitted         Amount and/or Complexity of Data Reviewed  Clinical lab tests: ordered and reviewed  Tests in the radiology section of CPT®: ordered and reviewed  Review and summarize past medical records: yes  Discuss the patient with other providers: yes  Independent visualization of images, tracings, or specimens: yes    Patient Progress  Patient progress: stable      Disposition  Final diagnoses:   Chest pain     Time reflects when diagnosis was documented in both MDM as applicable and the Disposition within this note     Time User Action Codes Description Comment    8/25/2022 12:46 AM Candido Good Add [R07 9] Chest pain     8/25/2022 12:06 PM Subha Samaniego [I20 0] Unstable angina ([address] )     8/25/2022 12:14 PM Pedro Rose Modify [R07 9] Chest pain     8/25/2022 12:14 PM Ananya Rose Modify [I20 0] Unstable angina [facility])       ED Disposition     ED Disposition   Admit    Condition   Stable    Date/Time   Thu Aug 25, 2022 12:46 AM    Comment   Case was discussed with LIO and the patient's admission status was agreed to be Admission Status: observation status to the service of Dr Bernabe Fritz   Follow-up Information    None         Current Discharge Medication List      CONTINUE these medications which have NOT CHANGED    Details   albuterol (Ventolin HFA) 90 mcg/act inhaler Inhale 2 puffs every 6 (six) hours as needed for wheezing  Qty: 8 g, Refills: 2    Comments: Substitution to a formulary equivalent within the same pharmaceutical class is authorized  Associated Diagnoses: Bronchitis      amLODIPine (NORVASC) 5 mg tablet Take 1 tablet (5 mg total) by mouth daily  Qty: 90 tablet, Refills: 3    Associated Diagnoses: Essential hypertension      b complex vitamins tablet Take 1 tablet by mouth daily      Cannabidiol 100 MG/ML SOLN Take by mouth daily as needed      fluticasone (FLONASE) 50 mcg/act nasal spray 2 sprays into each nostril daily  Qty: 16 g, Refills: 3    Associated Diagnoses: Seasonal allergic rhinitis due to pollen      Ginger Root POWD by Does not apply route      hydrochlorothiazide (HYDRODIURIL) 25 mg tablet Take 1 tablet (25 mg total) by mouth daily  Qty: 90 tablet, Refills: 3    Associated Diagnoses: Essential hypertension      losartan (COZAAR) 50 mg tablet Take 1 tablet (50 mg total) by mouth daily  Qty: 90 tablet, Refills: 3    Associated Diagnoses: Essential hypertension      Omega-3 Fatty Acids (OMEGA 3 500 PO) Take by mouth      pantoprazole (PROTONIX) 40 mg tablet Take 1 tablet (40 mg total) by mouth daily  Qty: 90 tablet, Refills: 1    Associated Diagnoses: Gastroesophageal reflux disease without esophagitis           No discharge procedures on file      PDMP Review     None           ED Provider  Attending physically available and evaluated Reetha L Dubinkin I managed the patient along with the ED Attending      Electronically Signed by         Chevy Bishop DO  08/25/22 5386

## 2022-08-25 NOTE — ED ATTENDING ATTESTATION
8/24/2022  Heather NGUYEN DO, saw and evaluated the patient  I have discussed the patient with the resident/non-physician practitioner and agree with the resident's/non-physician practitioner's findings, Plan of Care, and MDM as documented in the resident's/non-physician practitioner's note, except where noted  All available labs and Radiology studies were reviewed  I was present for key portions of any procedure(s) performed by the resident/non-physician practitioner and I was immediately available to provide assistance  At this point I agree with the current assessment done in the Emergency Department  I have conducted an independent evaluation of this patient a history and physical is as follows:    71-year-old female with chest pain  Started yesterday and today  History of abnormal stress test   Scheduled for a catheterization in October of this year  No dyspnea no shortness of breath right now    Did take aspirin pre-hospital   Plan to check basic cardiac labs admit for likely catheterization given angina-like symptoms    ED Course         Critical Care Time  Procedures

## 2022-08-26 ENCOUNTER — APPOINTMENT (OUTPATIENT)
Dept: NON INVASIVE DIAGNOSTICS | Facility: HOSPITAL | Age: 52
End: 2022-08-26
Payer: COMMERCIAL

## 2022-08-26 VITALS
RESPIRATION RATE: 17 BRPM | DIASTOLIC BLOOD PRESSURE: 84 MMHG | OXYGEN SATURATION: 99 % | SYSTOLIC BLOOD PRESSURE: 119 MMHG | HEART RATE: 79 BPM | BODY MASS INDEX: 50.02 KG/M2 | TEMPERATURE: 98.1 F | HEIGHT: 64 IN | WEIGHT: 293 LBS

## 2022-08-26 PROBLEM — R73.03 PRE-DIABETES: Status: ACTIVE | Noted: 2022-08-26

## 2022-08-26 PROBLEM — E66.813 CLASS 3 SEVERE OBESITY WITH SERIOUS COMORBIDITY AND BODY MASS INDEX (BMI) OF 50.0 TO 59.9 IN ADULT (HCC): Status: ACTIVE | Noted: 2022-08-26

## 2022-08-26 PROBLEM — E66.01 CLASS 3 SEVERE OBESITY WITH SERIOUS COMORBIDITY AND BODY MASS INDEX (BMI) OF 50.0 TO 59.9 IN ADULT (HCC): Status: ACTIVE | Noted: 2022-08-26

## 2022-08-26 PROBLEM — G47.33 OSA (OBSTRUCTIVE SLEEP APNEA): Status: ACTIVE | Noted: 2022-08-26

## 2022-08-26 LAB
ANION GAP SERPL CALCULATED.3IONS-SCNC: 2 MMOL/L (ref 4–13)
AORTIC ROOT: 2.1 CM
APICAL FOUR CHAMBER EJECTION FRACTION: 63 %
APTT PPP: 26 SECONDS (ref 23–37)
ASCENDING AORTA: 2.7 CM
BUN SERPL-MCNC: 11 MG/DL (ref 5–25)
CALCIUM SERPL-MCNC: 8.9 MG/DL (ref 8.3–10.1)
CHLORIDE SERPL-SCNC: 108 MMOL/L (ref 96–108)
CO2 SERPL-SCNC: 29 MMOL/L (ref 21–32)
CREAT SERPL-MCNC: 0.74 MG/DL (ref 0.6–1.3)
E WAVE DECELERATION TIME: 186 MS
ERYTHROCYTE [DISTWIDTH] IN BLOOD BY AUTOMATED COUNT: 13.2 % (ref 11.6–15.1)
FRACTIONAL SHORTENING: 35 (ref 28–44)
GFR SERPL CREATININE-BSD FRML MDRD: 94 ML/MIN/1.73SQ M
GLUCOSE P FAST SERPL-MCNC: 127 MG/DL (ref 65–99)
GLUCOSE SERPL-MCNC: 127 MG/DL (ref 65–140)
HCG SERPL QL: NEGATIVE
HCT VFR BLD AUTO: 32.5 % (ref 34.8–46.1)
HGB BLD-MCNC: 10.4 G/DL (ref 11.5–15.4)
INTERVENTRICULAR SEPTUM IN DIASTOLE (PARASTERNAL SHORT AXIS VIEW): 0.9 CM
INTERVENTRICULAR SEPTUM: 0.9 CM (ref 0.6–1.1)
LAAS-AP2: 19.3 CM2
LAAS-AP4: 16.5 CM2
LEFT ATRIUM SIZE: 3.4 CM
LEFT INTERNAL DIMENSION IN SYSTOLE: 3.2 CM (ref 2.1–4)
LEFT VENTRICULAR INTERNAL DIMENSION IN DIASTOLE: 4.9 CM (ref 3.5–6)
LEFT VENTRICULAR POSTERIOR WALL IN END DIASTOLE: 1.1 CM
LEFT VENTRICULAR STROKE VOLUME: 73 ML
LVSV (TEICH): 73 ML
MCH RBC QN AUTO: 28.8 PG (ref 26.8–34.3)
MCHC RBC AUTO-ENTMCNC: 32 G/DL (ref 31.4–37.4)
MCV RBC AUTO: 90 FL (ref 82–98)
MV E'TISSUE VEL-SEP: 14 CM/S
MV PEAK A VEL: 0.86 M/S
MV PEAK E VEL: 80 CM/S
MV STENOSIS PRESSURE HALF TIME: 54 MS
MV VALVE AREA P 1/2 METHOD: 4.07
PLATELET # BLD AUTO: 197 THOUSANDS/UL (ref 149–390)
PMV BLD AUTO: 10 FL (ref 8.9–12.7)
POTASSIUM SERPL-SCNC: 4 MMOL/L (ref 3.5–5.3)
RBC # BLD AUTO: 3.61 MILLION/UL (ref 3.81–5.12)
RIGHT ATRIUM AREA SYSTOLE A4C: 13.4 CM2
RIGHT VENTRICLE ID DIMENSION: 4 CM
SL CV LEFT ATRIUM LENGTH A2C: 5 CM
SL CV LV EF: 55
SL CV PED ECHO LEFT VENTRICLE DIASTOLIC VOLUME (MOD BIPLANE) 2D: 113 ML
SL CV PED ECHO LEFT VENTRICLE SYSTOLIC VOLUME (MOD BIPLANE) 2D: 40 ML
SODIUM SERPL-SCNC: 139 MMOL/L (ref 135–147)
TR MAX PG: 18 MMHG
TR PEAK VELOCITY: 2.1 M/S
TRICUSPID VALVE PEAK REGURGITATION VELOCITY: 2.11 M/S
WBC # BLD AUTO: 4.13 THOUSAND/UL (ref 4.31–10.16)

## 2022-08-26 PROCEDURE — 93306 TTE W/DOPPLER COMPLETE: CPT

## 2022-08-26 PROCEDURE — 93454 CORONARY ARTERY ANGIO S&I: CPT | Performed by: INTERNAL MEDICINE

## 2022-08-26 PROCEDURE — 99152 MOD SED SAME PHYS/QHP 5/>YRS: CPT | Performed by: INTERNAL MEDICINE

## 2022-08-26 PROCEDURE — 99153 MOD SED SAME PHYS/QHP EA: CPT | Performed by: INTERNAL MEDICINE

## 2022-08-26 PROCEDURE — C1894 INTRO/SHEATH, NON-LASER: HCPCS | Performed by: INTERNAL MEDICINE

## 2022-08-26 PROCEDURE — 99239 HOSP IP/OBS DSCHRG MGMT >30: CPT | Performed by: INTERNAL MEDICINE

## 2022-08-26 PROCEDURE — 84703 CHORIONIC GONADOTROPIN ASSAY: CPT

## 2022-08-26 PROCEDURE — C1769 GUIDE WIRE: HCPCS | Performed by: INTERNAL MEDICINE

## 2022-08-26 PROCEDURE — 85730 THROMBOPLASTIN TIME PARTIAL: CPT | Performed by: INTERNAL MEDICINE

## 2022-08-26 PROCEDURE — 85027 COMPLETE CBC AUTOMATED: CPT | Performed by: STUDENT IN AN ORGANIZED HEALTH CARE EDUCATION/TRAINING PROGRAM

## 2022-08-26 PROCEDURE — 99215 OFFICE O/P EST HI 40 MIN: CPT | Performed by: INTERNAL MEDICINE

## 2022-08-26 PROCEDURE — 99217 PR OBSERVATION CARE DISCHARGE MANAGEMENT: CPT | Performed by: INTERNAL MEDICINE

## 2022-08-26 PROCEDURE — 80048 BASIC METABOLIC PNL TOTAL CA: CPT | Performed by: INTERNAL MEDICINE

## 2022-08-26 PROCEDURE — 93306 TTE W/DOPPLER COMPLETE: CPT | Performed by: INTERNAL MEDICINE

## 2022-08-26 RX ORDER — SODIUM CHLORIDE 9 MG/ML
150 INJECTION, SOLUTION INTRAVENOUS CONTINUOUS
Status: DISPENSED | OUTPATIENT
Start: 2022-08-26 | End: 2022-08-26

## 2022-08-26 RX ORDER — EZETIMIBE 10 MG/1
10 TABLET ORAL DAILY
Qty: 30 TABLET | Refills: 2 | Status: SHIPPED | OUTPATIENT
Start: 2022-08-27 | End: 2022-10-10 | Stop reason: ALTCHOICE

## 2022-08-26 RX ORDER — HEPARIN SODIUM 1000 [USP'U]/ML
INJECTION, SOLUTION INTRAVENOUS; SUBCUTANEOUS AS NEEDED
Status: DISCONTINUED | OUTPATIENT
Start: 2022-08-26 | End: 2022-08-26 | Stop reason: HOSPADM

## 2022-08-26 RX ORDER — EZETIMIBE 10 MG/1
10 TABLET ORAL DAILY
Qty: 90 TABLET | Refills: 0 | Status: CANCELLED | OUTPATIENT
Start: 2022-08-27 | End: 2022-11-25

## 2022-08-26 RX ORDER — NITROGLYCERIN 20 MG/100ML
INJECTION INTRAVENOUS AS NEEDED
Status: DISCONTINUED | OUTPATIENT
Start: 2022-08-26 | End: 2022-08-26 | Stop reason: HOSPADM

## 2022-08-26 RX ORDER — FENTANYL CITRATE 50 UG/ML
INJECTION, SOLUTION INTRAMUSCULAR; INTRAVENOUS AS NEEDED
Status: DISCONTINUED | OUTPATIENT
Start: 2022-08-26 | End: 2022-08-26 | Stop reason: HOSPADM

## 2022-08-26 RX ORDER — ATORVASTATIN CALCIUM 20 MG/1
20 TABLET, FILM COATED ORAL EVERY EVENING
Qty: 30 TABLET | Refills: 2 | Status: SHIPPED | OUTPATIENT
Start: 2022-08-26 | End: 2022-10-10 | Stop reason: ALTCHOICE

## 2022-08-26 RX ORDER — VERAPAMIL HCL 2.5 MG/ML
AMPUL (ML) INTRAVENOUS AS NEEDED
Status: DISCONTINUED | OUTPATIENT
Start: 2022-08-26 | End: 2022-08-26 | Stop reason: HOSPADM

## 2022-08-26 RX ORDER — MIDAZOLAM HYDROCHLORIDE 2 MG/2ML
INJECTION, SOLUTION INTRAMUSCULAR; INTRAVENOUS AS NEEDED
Status: DISCONTINUED | OUTPATIENT
Start: 2022-08-26 | End: 2022-08-26 | Stop reason: HOSPADM

## 2022-08-26 RX ORDER — LIDOCAINE HYDROCHLORIDE 10 MG/ML
INJECTION, SOLUTION EPIDURAL; INFILTRATION; INTRACAUDAL; PERINEURAL AS NEEDED
Status: DISCONTINUED | OUTPATIENT
Start: 2022-08-26 | End: 2022-08-26 | Stop reason: HOSPADM

## 2022-08-26 RX ADMIN — SODIUM CHLORIDE 150 ML/HR: 0.9 INJECTION, SOLUTION INTRAVENOUS at 14:46

## 2022-08-26 RX ADMIN — HEPARIN SODIUM 4000 UNITS: 1000 INJECTION INTRAVENOUS; SUBCUTANEOUS at 10:16

## 2022-08-26 RX ADMIN — EZETIMIBE 10 MG: 10 TABLET ORAL at 09:00

## 2022-08-26 RX ADMIN — TICAGRELOR 180 MG: 90 TABLET ORAL at 06:26

## 2022-08-26 RX ADMIN — HEPARIN SODIUM 11.1 UNITS/KG/HR: 10000 INJECTION, SOLUTION INTRAVENOUS at 06:25

## 2022-08-26 RX ADMIN — PANTOPRAZOLE SODIUM 40 MG: 40 TABLET, DELAYED RELEASE ORAL at 06:27

## 2022-08-26 RX ADMIN — ASPIRIN 81 MG CHEWABLE TABLET 81 MG: 81 TABLET CHEWABLE at 09:00

## 2022-08-26 RX ADMIN — ATORVASTATIN CALCIUM 80 MG: 80 TABLET, FILM COATED ORAL at 17:16

## 2022-08-26 RX ADMIN — AMLODIPINE BESYLATE 5 MG: 5 TABLET ORAL at 09:01

## 2022-08-26 NOTE — ASSESSMENT & PLAN NOTE
Presents with chest pain and dyspnea on exertion which has been ongoing since November 2021   Seen by outpatient cardiology, ST 8/22 showed large defect was scheduled for [facility] in October but presented for worsening symptoms   -trops negative, EKG NSR  -cardiology consulted  -[facility] without CAD   -noncardiac / anginal chest pain

## 2022-08-26 NOTE — PROGRESS NOTES
14[address] [facility]  Progress Note - Evans President Dubinkin 1970, 47 y o  female MRN: [id number]  Unit/Bed#: [facility] 431-01 Encounter: [id number]  Primary Care Provider: Nataly Magana DO   Date and time admitted to hospital: 8/24/2022 10:50 PM    * Chest pain  Assessment & Plan  Presents with chest pain and dyspnea on exertion which has been ongoing since November 2021  Seen by outpatient cardiology,  8/22 showed large defect was scheduled for [facility] in October but presented for worsening symptoms   -trops negative, EKG NSR  -cardiology consulted  -plan for cath today   -ACS protocol heparin gtt, statin, ASA    Hyperlipidemia  Assessment & Plan  LDL direct 175, Tag 206, HDL 31  -continue Lipitor 40   -continue zetia 10    Pre-diabetes  Assessment & Plan  A1C 6 1%  Not on any home meds   -patient would benefit from initiation of metformin post discharge     MARKUS (obstructive sleep apnea)  Assessment & Plan  -reports compliance with cpap    Class 3 severe obesity with serious comorbidity and body mass index (BMI) of 50 0 to 59 9 in adult [facility])  Assessment & Plan  -diet and excercise counseling   -patient may benefit from bariatric surgery outpatient  -PCP follow up after discharge     Gastroesophageal reflux disease without esophagitis  Assessment & Plan  · Recent EGD on 6/27/22 showing mild gastric erythema  · Patient on ppi   · Continue while here     Essential hypertension  Assessment & Plan  Home rx: amlodipine, losartan, and hydrochlorothiazide  · Will hold hydrochlorothiazide, and losartan in anticipation for dye load with left heart catheterization      VTE Pharmacologic Prophylaxis: VTE Score: 4 Moderate Risk (Score 3-4) - Pharmacological DVT Prophylaxis Ordered: heparin drip  Patient Centered Rounds: I performed bedside rounds with nursing staff today    Discussions with Specialists or Other Care Team Provider: [facility] today    Education and Discussions with Family / Patient: Patient declined call to   patient declined to call spouse  She updates family     Time Spent for Care: 30 minutes  More than 50% of total time spent on counseling and coordination of care as described above  Current Length of Stay: 0 day(s)  Current Patient Status: Observation   Certification Statement: The patient will continue to require additional inpatient hospital stay due to UNDERGOING W/U [address] ACS  Discharge Plan: Anticipate discharge in 24-48 hrs to home  Code Status: Level 1 - Full Code    Subjective:   No overnight events  Patient seen and examined at bedside  No acute complaints at this time  Currently chest pain free  [facility] planned for today    Objective:     Vitals:   Temp (24hrs), Av 6 °F (36 4 °C), Min:97 1 °F (36 2 °C), Max:98 3 °F (36 8 °C)    Temp:  [97 1 °F (36 2 °C)-98 3 °F (36 8 °C)] 97 8 °F (36 6 °C)  HR:  [54-76] 54  Resp:  [16] 16  BP: (107-126)/(65-80) 122/70  SpO2:  [94 %-100 %] 95 %  Body mass index is 50 29 kg/m²  Input and Output Summary (last 24 hours): Intake/Output Summary (Last 24 hours) at 2022 0932  Last data filed at 2022 0349  Gross per 24 hour   Intake --   Output 900 ml   Net -900 ml       Physical Exam:   Physical Exam  Vitals and nursing note reviewed  Constitutional:       General: She is not in acute distress  Appearance: She is well-developed  She is obese  HENT:      Head: Normocephalic and atraumatic  Eyes:      Extraocular Movements: Extraocular movements intact  Conjunctiva/sclera: Conjunctivae normal       Pupils: Pupils are equal, round, and reactive to light  Cardiovascular:      Rate and Rhythm: Normal rate and regular rhythm  Heart sounds: No murmur heard  Pulmonary:      Effort: Pulmonary effort is normal  No respiratory distress  Breath sounds: Normal breath sounds  Abdominal:      Palpations: Abdomen is soft  Tenderness: There is no abdominal tenderness  Musculoskeletal:      Cervical back: Neck supple  Right lower leg: Edema present  Left lower leg: Edema present  Comments: Bilateral non pitting edema of lower ext   Skin:     General: Skin is warm and dry  Neurological:      Mental Status: She is alert and oriented to person, place, and time  Additional Data:     Labs:  Results from last 7 days   Lab Units 08/26/22  0828 08/25/22  0531 08/24/22  2326   WBC Thousand/uL 4 13*   < > 8 35   HEMOGLOBIN g/dL 10 4*   < > 11 5   HEMATOCRIT % 32 5*   < > 35 9   PLATELETS Thousands/uL 197   < > 252   NEUTROS PCT %  --   --  52   LYMPHS PCT %  --   --  41   MONOS PCT %  --   --  6   EOS PCT %  --   --  1    < > = values in this interval not displayed       Results from last 7 days   Lab Units 08/26/22  0828 08/24/22  2326   SODIUM mmol/L 139 141   POTASSIUM mmol/L 4 0 3 9   CHLORIDE mmol/L 108 107   CO2 mmol/L 29 29   BUN mg/dL 11 9   CREATININE mg/dL 0 74 0 82   ANION GAP mmol/L 2* 5   CALCIUM mg/dL 8 9 9 2   ALBUMIN g/dL  --  4 0   TOTAL BILIRUBIN mg/dL  --  0 37   ALK PHOS U/L  --  79   ALT U/L  --  68   AST U/L  --  47*   GLUCOSE RANDOM mg/dL 127 114     Results from last 7 days   Lab Units 08/25/22  0152   INR  1 02         Results from last 7 days   Lab Units 08/25/22  0531   HEMOGLOBIN A1C % 6 1*           Lines/Drains:  Invasive Devices  Report    Peripheral Intravenous Line  Duration           Peripheral IV 08/24/22 Left Antecubital 1 day    Peripheral IV 08/25/22 Right Hand 1 day                  Telemetry:  Telemetry Orders (From admission, onward)             48 Hour Telemetry Monitoring  Continuous x 48 hours        References:    Telemetry Guidelines   Question:  Reason for 48 Hour Telemetry  Answer:  Acute MI, chest pain - R/O MI, or unstable angina                 Telemetry Reviewed: Normal Sinus Rhythm  Indication for Continued Telemetry Use: Acute MI/Unstable Angina/Rule out ACS             Imaging: Reviewed radiology reports from this admission including: ultrasound(s)    Recent Cultures (last 7 days):         Last 24 Hours Medication List:   Current Facility-Administered Medications   Medication Dose Route Frequency Provider Last Rate    acetaminophen  650 mg Oral Q6H PRN Randall Patelalides, PA-C      amLODIPine  5 mg Oral Daily Randall Patelalides, PA-C      aspirin  81 mg Oral Daily Randall Patelalides, PA-C      atorvastatin  80 mg Oral QPM Randall Patelalimurphy, PA-C      calcium carbonate  1,000 mg Oral Daily PRN [employer], PA-C      ezetimibe  10 mg Oral Daily Bharath Eddy MD      heparin (porcine)  3-20 Units/kg/hr (Order-Specific) Intravenous Titrated Randall Pasalides, PA-C 11 1 Units/kg/hr (08/26/22 0625)    heparin (porcine)  2,000 Units Intravenous Q1H PRN Randall Patelalides, PA-C      heparin (porcine)  4,000 Units Intravenous Q1H PRN Randall Pasalides, PA-C      pantoprazole  40 mg Oral Early Morning Randall Patelalimurphy, PA-C      sodium chloride (PF)  3 mL Intravenous Q1H PRN [employer], PA-C          Today, Patient Was Seen By: Ora Strickland DO    **Please Note: This note may have been constructed using a voice recognition system  **

## 2022-08-26 NOTE — QUICK NOTE
Spoke to pt with  at bedside about cath results  Shared that she is to follow up as an outpatient in 1 week  Answered all questions she and  had

## 2022-08-26 NOTE — ASSESSMENT & PLAN NOTE
Home rx: amlodipine, losartan, and hydrochlorothiazide  · Will hold hydrochlorothiazide, and losartan in anticipation for dye load with left heart catheterization

## 2022-08-26 NOTE — UTILIZATION REVIEW
Initial Clinical Review    Admission: Date/Time/Statement:   Admission Orders (From admission, onward)     Ordered        08/25/22 0050  Place in Observation  Once                      Orders Placed This Encounter   Procedures    Place in Observation     Standing Status:   Standing     Number of Occurrences:   1     Order Specific Question:   Level of Care     Answer:   Med Surg [16]     ED Arrival Information     Expected   -    Arrival   8/24/2022 21:56    Acuity   Emergent            Means of arrival   Walk-In    Escorted by   Self    Service   Hospitalist    Admission type   Emergency            Arrival complaint   SOB  CP           Chief Complaint   Patient presents with    Heart Problem     Recent stress test, showed abnormalities  Scheduled for cath tomorrow  Having chest discomfort  Took ASA, felt some relief  Initial Presentation: 47 y o  female with PMHx of obesity, MARKUS, HTN, HLD, GERD presents to ED as a walk-in with c/o chest pain and dyspnea on exertion which has been ongoing since November 2021  Seen by outpatient cardiologyST 8/22 showed large defect was scheduled for [facility] in October but presented  today for worsening symptoms  In ED initial Trop neg  EKG unremarkable  Admit to M/S/Tele unit under observation -- tele monitoring, serial troponins  Echo, lipids and hgbA1c  Npo for now  Cardiology consult  Start statin, ppi  Continue pta amlodipine hold losartan and hydrochlorothiazide in anticipation for possible dye load with left heart catheterization  Start on metoprolol 25 mg BID    Consult cardiology 8/25 -- ECG unremarkable  Negative HS trop  NM stress personally reviewed  These anterior and inferior defects may be from soft tissue attenuation artifact  Prone imaging was unable to be performed  No quantitative TID but visually appears so  Given risk factors and presenting complaints, I recommend definitive eval with left heart catheterization to rule out obstructive CAD         Date: 8/26   Day 2:     ED Triage Vitals   Temperature Pulse Respirations Blood Pressure SpO2   08/24/22 2214 08/24/22 2212 08/24/22 2212 08/24/22 2212 08/24/22 2212   98 °F (36 7 °C) 79 18 (!) 149/126 98 %      Temp Source Heart Rate Source Patient Position - Orthostatic VS BP Location FiO2 (%)   08/24/22 2214 08/25/22 2003 08/25/22 2003 08/25/22 2003 --   Oral Monitor Sitting Left arm       Pain Score       08/25/22 0400       No Pain          Wt Readings from Last 1 Encounters:   08/19/22 133 kg (293 lb 12 8 oz)     Additional Vital Signs:   Date/Time Temp Pulse Resp BP MAP (mmHg) SpO2 O2 Device   08/26/22 07:23:27 97 8 °F (36 6 °C) 62 16 122/70 87 95 % --   08/26/22 03:33:38 97 1 °F (36 2 °C) Abnormal  61 -- 124/65 85 100 % --   08/25/22 22:44:30 97 3 °F (36 3 °C) Abnormal  76 -- 107/72 84 94 % --   08/25/22 20:26:17 97 7 °F (36 5 °C) 63 -- 126/80 95 99 % --   08/25/22 20:03:55 97 3 °F (36 3 °C) Abnormal  76 16 124/79 94 98 % None (Room air)   08/25/22 15:18:28 97 7 °F (36 5 °C) 70 -- 125/79 94 97 % --   08/25/22 12:23:59 98 3 °F (36 8 °C) 58 -- 111/73 86 99 % --   08/25/22 09:45:27 -- 58 -- 112/76 88 99 % --   08/25/22 0800 -- -- -- -- -- 99 % None (Room air)   08/25/22 07:20:09 98 1 °F (36 7 °C) 62 -- 117/74 88 97 % --   08/25/22 0400 -- -- -- -- -- -- None (Room air)   08/25/22 03:43:33 -- 79 16 127/79 95 97 % --   08/25/22 0300 -- 84 -- -- -- 98 % --   08/25/22 0215 -- 80 -- -- -- 98 % --   08/25/22 0130 -- 82 -- 149/93 113 100 % --   08/25/22 0030 -- 82 -- 112/75 88 100 % --   08/24/22 2334 -- -- -- -- -- -- None (Room air)   08/24/22 2214 98 °F (36 7 °C) -- -- -- -- -- --   08/24/22 2212 -- 79 18 149/126 Abnormal  -- 98 % --       Pertinent Labs/Diagnostic Test Results:   X-ray chest 1 view portable   Final Result by Julián Felix MD (08/25 4865)      No acute cardiopulmonary disease                 Results from last 7 days   Lab Units 08/25/22  0531 08/24/22  2326   WBC Thousand/uL 7 77 8 35   HEMOGLOBIN g/dL 11 3* 11 5   HEMATOCRIT % 36 3 35 9   PLATELETS Thousands/uL 250 252   NEUTROS ABS Thousands/µL  --  4 25     Results from last 7 days   Lab Units 08/24/22  2326   SODIUM mmol/L 141   POTASSIUM mmol/L 3 9   CHLORIDE mmol/L 107   CO2 mmol/L 29   ANION GAP mmol/L 5   BUN mg/dL 9   CREATININE mg/dL 0 82   EGFR ml/min/1 73sq m 83   CALCIUM mg/dL 9 2     Results from last 7 days   Lab Units 08/24/22  2326   AST U/L 47*   ALT U/L 68   ALK PHOS U/L 79   TOTAL PROTEIN g/dL 7 9   ALBUMIN g/dL 4 0   TOTAL BILIRUBIN mg/dL 0 37     Results from last 7 days   Lab Units 08/24/22  2326   GLUCOSE RANDOM mg/dL 114     Results from last 7 days   Lab Units 08/25/22  0531   HEMOGLOBIN A1C % 6 1*   EAG mg/dl 128     Results from last 7 days   Lab Units 08/25/22  0535 08/25/22  0148 08/24/22  2326   HS TNI 0HR ng/L  --   --  3   HS TNI 2HR ng/L  --  3  --    HSTNI D2 ng/L  --  0  --    HS TNI 4HR ng/L 3  --   --    HSTNI D4 ng/L 0  --   --      Results from last 7 days   Lab Units 08/25/22  0152   PROTIME seconds 13 6   INR  1 02   PTT seconds 28       ED Treatment:   Medication Administration from 08/24/2022 2151 to 08/25/2022 0334       Date/Time Order Dose Route Action     08/25/2022 0256 metoprolol tartrate (LOPRESSOR) tablet 25 mg 25 mg Oral Given     08/25/2022 0256 heparin (porcine) injection 4,000 Units 4,000 Units Intravenous Given     08/25/2022 0257 heparin (porcine) 25,000 units in 0 45% NaCl 250 mL infusion (premix) 11 1 Units/kg/hr Intravenous New Bag     Past Medical History:   Diagnosis Date    Anemia     Last Assessed: 18 June 2013    Concussion     Eczema     Hyperlipidemia     Hypertension     Reactive airway disease     Sleep apnea      Present on Admission:   Hyperlipidemia   Gastroesophageal reflux disease without esophagitis   Essential hypertension      Admitting Diagnosis: Chest pain [R07 9]  Known medical problems [Z78 9]  Age/Sex: 47 y o  female  Admission Orders:  Scheduled Medications:  amLODIPine, 5 mg, Oral, Daily  aspirin, 81 mg, Oral, Daily  atorvastatin, 80 mg, Oral, QPM  ezetimibe, 10 mg, Oral, Daily  pantoprazole, 40 mg, Oral, Early Morning    Continuous IV Infusions:  heparin (porcine), 3-20 Units/kg/hr (Order-Specific), Intravenous, Titrated    PRN Meds:  acetaminophen, 650 mg, Oral, Q6H PRN  calcium carbonate, 1,000 mg, Oral, Daily PRN  heparin (porcine), 2,000 Units, Intravenous, Q1H PRN  heparin (porcine), 4,000 Units, Intravenous, Q1H PRN  sodium chloride (PF), 3 mL, Intravenous, Q1H PRN        IP CONSULT TO CARDIOLOGY    Network Utilization Review Department  ATTENTION: Please call with any questions or concerns to [phone] and carefully listen to the prompts so that you are directed to the right person  All voicemails are confidential   Willy Tejeda all requests for admission clinical reviews, approved or denied determinations and any other requests to dedicated fax number below belonging to the campus where the patient is receiving treatment   List of dedicated fax numbers for the Facilities:  [address] [place] [address] DENIALS (Administrative/Medical Necessity) [phone]   [address] [place] [address] (Maternity/NICU/Pediatrics) [phone]   [address]  [address] [facility] [place] [address] [address] [place] [address] [address] [facility] [facility] [phone]

## 2022-08-26 NOTE — ASSESSMENT & PLAN NOTE
LDL direct 175, Tag 206, HDL 31  The 10-year ASCVD risk score (Lj Dominguez et al , 2013) is: 7 3%    Values used to calculate the score:      Age: 47 years      Sex: Female      Is Non- : Yes      Diabetic: No      Tobacco smoker: No      Systolic Blood Pressure: 683 mmHg      Is BP treated: Yes      HDL Cholesterol: 31 mg/dL      Total Cholesterol: 230 mg/dL  -continue Lipitor 20  -continue zetia 10

## 2022-08-26 NOTE — DISCHARGE SUMMARY
1425 [facility]  Discharge- 200 [facility] 1970, 47 y o  female MRN: [id number]  Unit/Bed#: [facility] 431-01 Encounter: [id number]  Primary Care Provider: Jacoby Lisa DO   Date and time admitted to hospital: 8/24/2022 10:50 PM    * Chest pain  Assessment & Plan  Presents with chest pain and dyspnea on exertion which has been ongoing since November 2021   Seen by outpatient cardiology,  8/22 showed large defect was scheduled for [facility] in October but presented for worsening symptoms   -trops negative, EKG NSR  -cardiology consulted  -[facility] without CAD   -noncardiac / anginal chest pain    Hyperlipidemia  Assessment & Plan  LDL direct 175, Tag 206, HDL 31  The 10-year ASCVD risk score (Jaciel Bob et al , 2013) is: 7 3%    Values used to calculate the score:      Age: 47 years      Sex: Female      Is Non- : Yes      Diabetic: No      Tobacco smoker: No      Systolic Blood Pressure: 229 mmHg      Is BP treated: Yes      HDL Cholesterol: 31 mg/dL      Total Cholesterol: 230 mg/dL  -continue Lipitor 20  -continue zetia 10    Pre-diabetes  Assessment & Plan  A1C 6 1%  Not on any home meds   -patient would benefit from initiation of metformin post discharge   -outpatient endo f/u - referral provided    MARKUS (obstructive sleep apnea)  Assessment & Plan  -reports compliance with cpap    Class 3 severe obesity with serious comorbidity and body mass index (BMI) of 50 0 to 59 9 in adult [facility])  Assessment & Plan  -diet and excercise counseling   -patient may benefit from bariatric surgery outpatient  -PCP follow up after discharge     Gastroesophageal reflux disease without esophagitis  Assessment & Plan  · Recent EGD on 6/27/22 showing mild gastric erythema  · Patient on ppi   · Continue while here     Essential hypertension  Assessment & Plan  Home rx: amlodipine, losartan, and hydrochlorothiazide  · Stable on norvasc 5 mg alone      Medical Problems Resolved Problems  Date Reviewed: 8/26/2022   None               Discharging Physician / Practitioner: Pedro Mcgarry DO  PCP: Michele Thompson DO  Admission Date:   Admission Orders (From admission, onward)     Ordered        08/25/22 0050  Place in Observation  Once                      Discharge Date: 08/26/22    Consultations During Hospital Stay:  · cardiology    Procedures Performed:   · LHC - no coronary artery disease    Significant Findings / Test Results:   · Echo - EF 55%  Systolic fx normal  Diastolic fx normal  No wall motion abnl  · , , HDL 31,   · A1C 6 1%    Incidental Findings:   · none     Test Results Pending at Discharge (will require follow up):   · none     Outpatient Tests Requested:  · none    Complications:  none    Reason for Admission: chest pain    Hospital Course:   Tyler Gibbons is a 47 y o  female patient who originally presented to the hospital on 8/24/2022 due to chest pain  Past medical history of recent abnormal stress test earlier this month, hyperlipidemia, hypertension who presents with chest pain and shortness of breath  Chest pain described as a heaviness and retrosternal, worse with exertion and still present in the ER  Initial EKG without ST elevation an initial troponin negative and trop at hour 2 and 4  Given patient's continued chest pain in the setting of a recent abnormal stress test, concern for unstable angina and as such will start ACS treatment with heparin drip, aspirin, statin  Cardiology consulted and patient under went catheterization 8/26 which did not show evidence of coronary artery disease  Per cardiology continue amlodipine at discharge as this may provide some antianginal benefit  Also continue Lipitor 20 mg, moderate transfer the statin in the setting of 7 3% ACSCVD risk  Continues ezetimibe for high triglycerides  Will follow-up outpatient with Cardiology    Also provided referral for nutritional services and endocrinology referral   Patient may benefit from metformin verses SGLT 2 inhibitor at discharge  Patient stable at time of discharge for discharge home  Please see above list of diagnoses and related plan for additional information  Condition at Discharge: good    Discharge Day Visit / Exam:   * Please refer to separate progress note for these details *    Discussion with Family: Updated  () via phone  Discharge instructions/Information to patient and family:   See after visit summary for information provided to patient and family  Provisions for Follow-Up Care:  See after visit summary for information related to follow-up care and any pertinent home health orders  Disposition:   Home    Planned Readmission: No     Discharge Statement:  I spent 25 minutes discharging the patient  This time was spent on the day of discharge  I had direct contact with the patient on the day of discharge  Greater than 50% of the total time was spent examining patient, answering all patient questions, arranging and discussing plan of care with patient as well as directly providing post-discharge instructions  Additional time then spent on discharge activities  Discharge Medications:  See after visit summary for reconciled discharge medications provided to patient and/or family        **Please Note: This note may have been constructed using a voice recognition system**

## 2022-08-26 NOTE — ASSESSMENT & PLAN NOTE
-diet and excercise counseling   -patient may benefit from bariatric surgery outpatient  -PCP follow up after discharge

## 2022-08-26 NOTE — ASSESSMENT & PLAN NOTE
Presents with chest pain and dyspnea on exertion which has been ongoing since November 2021   Seen by outpatient cardiology, ST 8/22 showed large defect was scheduled for [facility] in October but presented for worsening symptoms   -trops negative, EKG NSR  -cardiology consulted  -plan for cath today   -ACS protocol heparin gtt, statin, ASA

## 2022-08-26 NOTE — PROGRESS NOTES
Cardiology Team 2 Progress Note - Reetha L Dubinkin 47 y o  female MRN: [id number]    Unit/Bed#: [facility] 431-01 Encounter: [id number]    Current Problem List   Principal Problem:    Chest pressure on exertion   Active Problems:    Obesity   Sleep apnea  HTN  HLD  GERD  Assessment/Plan:     Cipriano Alva is a 47y o  year old female with a history of obesity, sleep apnea, HTN, HLD, GERD who presented to the ED with worsening symptoms of Chest pressure on exertion  1  Angina   ? Patient experiencing CP on exertion that she does not have at rest but has progressed since the pain initially began in November of 2021  ? Troponin's and EKG negative in the ED   ? Previous stress test done in 2022 showed Left ventricular perfusion deficits which is large in size  Large area of ischemia of anterior and inferior inferoseptal walls   ? Was schedule for a LHC in October of 2022 but the pain brought her to the ED before   ? ECHO done this morning, results pending   ? Plan will be for her to get a CATH today due to worsening of symptoms - pt has remained NPO since yesterday   2  HLD  ? Was recommended to take a statin at home but patient declined saying that she would rather treat it homeopathically   ? Patient is now on Statin 80mg  ? ASA started  3  HTN  ? Home regimen of Norvasc 5mg, HCTZ 25 mg, and Losartan 50mg  ? Currently on amlodipine 5, metoprolol tartrate 25mg BID,       Subjective:   Patient seen and examined lying in bed with ECHO ultrasound technician at bedside  States that she is doing well today and has no complaints  She informs me that she will be going for her CATH today at 1pm  She remains NPO  States that she had one episode of chest tightness last night when she got up to go to the bathroom  The timing seems to match the overnight event of tachycardia where her HR spiked to 169 on telemetry  Symptoms resolved at rest      Vitals: Blood pressure 122/70, pulse 62, temperature 97 8 °F (36 6 °C), resp   rate 16, last menstrual period 08/01/2021, SpO2 95 %, not currently breastfeeding  , There is no height or weight on file to calculate BMI ,   Orthostatic Blood Pressures    Flowsheet Row Most Recent Value   Blood Pressure 122/70 filed at 08/26/2022 2605   Patient Position - Orthostatic VS Sitting filed at 08/25/2022 2003            Intake/Output Summary (Last 24 hours) at 8/26/2022 0830  Last data filed at 8/26/2022 0349  Gross per 24 hour   Intake --   Output 900 ml   Net -900 ml       Physical Exam:    GEN: Antoine Carpenter appears well, alert and oriented x 3, pleasant and cooperative   HEENT:  Normocephalic, atraumatic, anicteric, moist mucous membranes  NECK: No JVD or carotid bruits   HEART: regular rhythm, regular rate, normal S1 and S2, no murmurs, clicks, gallops or rubs   LUNGS: Clear to auscultation bilaterally; no wheezes, rales, or rhonchi; respiration nonlabored   ABDOMEN:  Normoactive bowel sounds, soft, no tenderness, no distention  EXTREMITIES: peripheral pulses palpable; trace edema in BLLE   NEURO: no gross focal findings; cranial nerves grossly intact   SKIN:  Dry, intact, warm to touch      Current Facility-Administered Medications:     acetaminophen (TYLENOL) tablet 650 mg, 650 mg, Oral, Q6H PRN, Randall Watkins PA-C    amLODIPine (NORVASC) tablet 5 mg, 5 mg, Oral, Daily, Randall Watkins PA-C, 5 mg at 08/25/22 2951    aspirin chewable tablet 81 mg, 81 mg, Oral, Daily, Randall Watkins PA-C    atorvastatin (LIPITOR) tablet 80 mg, 80 mg, Oral, QPM, Randall Watkins PA-C, 80 mg at 08/25/22 1809    calcium carbonate (TUMS) chewable tablet 1,000 mg, 1,000 mg, Oral, Daily PRN, Janelle Cunningham PA-C    ezetimibe (ZETIA) tablet 10 mg, 10 mg, Oral, Daily, Jacquie Johnson MD    heparin (porcine) 25,000 units in 0 45% NaCl 250 mL infusion (premix), 3-20 Units/kg/hr (Order-Specific), Intravenous, Titrated, Randall Pasalides, PA-C, Last Rate: 10 mL/hr at 08/26/22 0625, 11 1 Units/kg/hr at 08/26/22 0625   heparin (porcine) injection 2,000 Units, 2,000 Units, Intravenous, Q1H PRN, Randall Watkins PA-C    heparin (porcine) injection 4,000 Units, 4,000 Units, Intravenous, Q1H PRN, Randall Watkins PA-C    pantoprazole (PROTONIX) EC tablet 40 mg, 40 mg, Oral, Early Morning, Randall Watkins PA-C, 40 mg at 08/26/22 3073    Insert peripheral IV, , , Once **AND** sodium chloride (PF) 0 9 % injection 3 mL, 3 mL, Intravenous, Q1H PRN, Tashi Moore PA-C    Labs & Results:          Results from last 7 days   Lab Units 08/24/22  2326   POTASSIUM mmol/L 3 9   CO2 mmol/L 29   CHLORIDE mmol/L 107   BUN mg/dL 9   CREATININE mg/dL 0 82     Results from last 7 days   Lab Units 08/25/22  0531 08/24/22  2326   HEMOGLOBIN g/dL 11 3* 11 5   HEMATOCRIT % 36 3 35 9   PLATELETS Thousands/uL 250 252     Results from last 7 days   Lab Units 08/25/22  0531   TRIGLYCERIDES mg/dL 206*   HDL mg/dL 31*   LDL CALC mg/dL 158*   HEMOGLOBIN A1C % 6 1*       Telemetry:   Personally reviewed by Scott Bernal DO:  NSR, 68BPM  ONE:   0344 tachycardia rate of 169  2249 on 8/25 bradycardia rate of 49  1837 on 8/25 tachycardia rate of 204    Imaging:   Echo pending      VTE Prophylaxis: Heparin       Case discussed and reviewed with Dr Jacy Pereira and is pending their agreement with the assessment and plan  Thank you for involving us in the care of your patient  Scott Bernal DO  PGY-1  8 [facility] [place]/ [facility] Everywhere records reviewed:     ** Please Note: Fluency DirectDictation voice to text software may have been used in the creation of this document   **

## 2022-08-26 NOTE — ASSESSMENT & PLAN NOTE
A1C 6 1%  Not on any home meds   -patient would benefit from initiation of metformin post discharge   -outpatient endo f/u - referral provided

## 2022-08-29 ENCOUNTER — TRANSITIONAL CARE MANAGEMENT (OUTPATIENT)
Dept: FAMILY MEDICINE CLINIC | Facility: CLINIC | Age: 52
End: 2022-08-29

## 2022-09-02 ENCOUNTER — OFFICE VISIT (OUTPATIENT)
Dept: CARDIOLOGY CLINIC | Facility: CLINIC | Age: 52
End: 2022-09-02
Payer: COMMERCIAL

## 2022-09-02 VITALS
OXYGEN SATURATION: 98 % | SYSTOLIC BLOOD PRESSURE: 118 MMHG | DIASTOLIC BLOOD PRESSURE: 70 MMHG | BODY MASS INDEX: 48.91 KG/M2 | HEIGHT: 64 IN | WEIGHT: 286.5 LBS | HEART RATE: 78 BPM

## 2022-09-02 DIAGNOSIS — E78.5 HYPERLIPIDEMIA, UNSPECIFIED HYPERLIPIDEMIA TYPE: Primary | ICD-10-CM

## 2022-09-02 PROCEDURE — 3074F SYST BP LT 130 MM HG: CPT | Performed by: INTERNAL MEDICINE

## 2022-09-02 PROCEDURE — 3078F DIAST BP <80 MM HG: CPT | Performed by: INTERNAL MEDICINE

## 2022-09-02 PROCEDURE — 99213 OFFICE O/P EST LOW 20 MIN: CPT | Performed by: INTERNAL MEDICINE

## 2022-09-02 NOTE — PROGRESS NOTES
Cardiology Clinic Note  Reetha L Dubinkin 47 y o  female [id number]      Assessment and Plan      Current Problem List   Active Problems:    * No active hospital problems  *    Assessment/Plan:    1  Recent ER visit for chest pain   Patient recently went to ER for chest pain  Had cardiac catheterization which was unremarkable  Patient was seen in our office few weeks ago for similar complaint and was already scheduled for cardiac catheterization in October although her chest pain worsen which prompted her to go to ER for further evaluation  Since her hospital discharge, patient has been doing well   Will focus on risk factor modification  Will continue Lipitor and Zetia given her prediabetes and hyperlipidemia   I educated on on weight loss  Patient is actively trying to lose weight  Repeat lipid panel in 2 months   Follow-up in 4 months  Subjective     This is 47 year female with past medical history of GERD, hypertension, hyperlipidemia, MARKUS who is presenting for hospital follow-up for chest pain  Patient was last seen in our clinic for situ nuclear stress test   Patient was scheduled for cardiac catheterization in October  In the meantime, patient had worsening chest pain which prompted her to go to ER for further evaluation  Patient had ischemic work up with cardiac catheterization which was normal   Since her discharge, she has been doing well  Denies any episodes of chest or abdominal pain  Objective     Vitals: Body mass index is 49 18 kg/m²  ROS:    Review of Systems   Constitutional: Negative for chills, decreased appetite and diaphoresis  Eyes: Negative for blurred vision, double vision and pain  Cardiovascular: Negative for chest pain, claudication, dyspnea on exertion and leg swelling  Respiratory: Negative for cough, hemoptysis and shortness of breath  Gastrointestinal: Negative for bloating, abdominal pain and anorexia         Physical Exam:  Physical Exam  Vitals reviewed  Constitutional:       General: She is not in acute distress  Appearance: She is well-developed  She is not diaphoretic  Cardiovascular:      Rate and Rhythm: Normal rate and regular rhythm  Heart sounds: Normal heart sounds  No murmur heard  No friction rub  Pulmonary:      Effort: Pulmonary effort is normal  No respiratory distress  Breath sounds: Normal breath sounds  No stridor  Abdominal:      General: Bowel sounds are normal  There is no distension  Palpations: Abdomen is soft  Tenderness: There is no abdominal tenderness  There is no rebound  Psychiatric:         Behavior: Behavior normal          Thought Content: Thought content normal          Invasive Devices  Report    None                     Labs: Invalid input(s):  EOSPCT       Invalid input(s): LABALBU              No results found for: PHART, FEV3NAP, PO2ART, PVF7BRD, Q4ATNHGX, BEART, SOURCE  No components found for: HIV1X2  No results found for: HAV, HEPAIGM, HEPBIGM, HEPBCAB, HBEAG, HEPCAB  No results found for: SPEP, UPEP   Lab Results   Component Value Date    HGBA1C 6 1 (H) 08/25/2022     Lab Results   Component Value Date    CHOL 239 (H) 04/25/2016      Lab Results   Component Value Date    HDL 31 (L) 08/25/2022    HDL 31 (L) 04/25/2016      Lab Results   Component Value Date    LDLCALC 158 (H) 08/25/2022      Lab Results   Component Value Date    TRIG 206 (H) 08/25/2022    TRIG 180 (H) 04/25/2016     No components found for: PROCAL      Micro:      Urinalysis:  No results found for: AMPHETUR, BDZUR, COCAINEUR, OPIATEUR, PCPUR, THCUR, ETOH, ACTMNPHEN, SALICYLATE       Invalid input(s): URIBILINOGEN          Nutrition:  No diet orders on file  Radiology Results:   No orders to display     Scheduled Medications:        PLEASE NOTE:  This encounter was completed utilizing the [employer] Direct Speech Voice Recognition Software   Grammatical errors, random word insertions, pronoun errors and incomplete sentences are occasional consequences of the system due to software limitations, ambient noise and hardware issues  These may be missed by proof reading prior to affixing electronic signature  Any questions or concerns about the content, text or information contained within the body of this dictation should be directly addressed to the physician for clarification  Please do not hesitate to call me directly if you have any any questions or concerns

## 2022-09-07 DIAGNOSIS — K21.9 GASTROESOPHAGEAL REFLUX DISEASE WITHOUT ESOPHAGITIS: ICD-10-CM

## 2022-09-07 RX ORDER — PANTOPRAZOLE SODIUM 40 MG/1
40 TABLET, DELAYED RELEASE ORAL DAILY
Qty: 90 TABLET | Refills: 0 | Status: SHIPPED | OUTPATIENT
Start: 2022-09-07 | End: 2022-10-23

## 2022-09-07 NOTE — TELEPHONE ENCOUNTER
Received fax requesting refills of Hydroclorothiaz tab and Losartan Tab   Meds not on patients current medication list

## 2022-09-19 DIAGNOSIS — I10 ESSENTIAL HYPERTENSION: ICD-10-CM

## 2022-09-20 RX ORDER — LOSARTAN POTASSIUM 50 MG/1
50 TABLET ORAL DAILY
Qty: 90 TABLET | Refills: 3 | OUTPATIENT
Start: 2022-09-20 | End: 2022-12-19

## 2022-09-20 RX ORDER — HYDROCHLOROTHIAZIDE 25 MG/1
25 TABLET ORAL DAILY
Qty: 90 TABLET | Refills: 3 | OUTPATIENT
Start: 2022-09-20 | End: 2022-12-19

## 2022-09-26 ENCOUNTER — TELEMEDICINE (OUTPATIENT)
Dept: FAMILY MEDICINE CLINIC | Facility: CLINIC | Age: 52
End: 2022-09-26
Payer: COMMERCIAL

## 2022-09-26 DIAGNOSIS — D50.9 IRON DEFICIENCY ANEMIA, UNSPECIFIED IRON DEFICIENCY ANEMIA TYPE: Primary | ICD-10-CM

## 2022-09-26 DIAGNOSIS — E78.5 HYPERLIPIDEMIA, UNSPECIFIED HYPERLIPIDEMIA TYPE: ICD-10-CM

## 2022-09-26 DIAGNOSIS — I10 ESSENTIAL HYPERTENSION: ICD-10-CM

## 2022-09-26 DIAGNOSIS — R06.09 DYSPNEA ON EXERTION: ICD-10-CM

## 2022-09-26 DIAGNOSIS — R73.03 PRE-DIABETES: ICD-10-CM

## 2022-09-26 DIAGNOSIS — R53.83 FATIGUE, UNSPECIFIED TYPE: ICD-10-CM

## 2022-09-26 DIAGNOSIS — G47.33 OSA (OBSTRUCTIVE SLEEP APNEA): ICD-10-CM

## 2022-09-26 PROCEDURE — 99214 OFFICE O/P EST MOD 30 MIN: CPT | Performed by: NURSE PRACTITIONER

## 2022-09-26 NOTE — ASSESSMENT & PLAN NOTE
Patient's blood pressure has been stable at home  Her last blood pressure at home is 111/63  Her medications were adjusted while hospitalized and now she is currently on amlodipine 5 mg a day  Her hydrochlorothiazide and losartan were stopped during her hospital stay  She does watch the sodium in her diet  The patient states that she is having some minor leg swelling like to go back on her hydrochlorothiazide  She recently saw her cardiologist post hospital visit    I did recommend she contact their office for further recommendations of her medications for hypertension

## 2022-09-26 NOTE — PROGRESS NOTES
Chief Complaint   Patient presents with    Transition of Care Management     Recent stress test, showed abnormalities   Virtual Tcm     Health Maintenance   Topic Date Due    Hepatitis C Screening  Never done    Pneumococcal Vaccine: Pediatrics (0 to 5 Years) and At-Risk Patients (6 to 59 Years) (1 - PCV) Never done    HIV Screening  Never done    Annual Physical  Never done    DTaP,Tdap,and Td Vaccines (1 - Tdap) Never done    Cervical Cancer Screening  Never done    BMI: Followup Plan  06/18/2021    COVID-19 Vaccine (3 - Booster for Moderna series) 07/10/2021    Influenza Vaccine (1) 09/01/2022    Depression Screening  03/28/2023    BMI: Adult  09/02/2023    Colorectal Cancer Screening  07/22/2032    HIB Vaccine  Aged Out    Hepatitis B Vaccine  Aged Out    IPV Vaccine  Aged Out    Hepatitis A Vaccine  Aged Out    Meningococcal ACWY Vaccine  Aged Out    HPV Vaccine  Aged Out       Virtual TCM Visit:    Verification of patient location:    Patient is located in the following state in which I hold an active license [place]        Assessment/Plan:        Problem List Items Addressed This Visit        Respiratory    MARKUS (obstructive sleep apnea)     Patient with history of obstructive sleep apnea  She did receive her replacement machine however she does not have supplies  I will refer her to pulmonology  She is compliant with her old machine  Relevant Orders    Ambulatory Referral to Pulmonology       Cardiovascular and Mediastinum    Essential hypertension     Patient's blood pressure has been stable at home  Her last blood pressure at home is 111/63  Her medications were adjusted while hospitalized and now she is currently on amlodipine 5 mg a day  Her hydrochlorothiazide and losartan were stopped during her hospital stay  She does watch the sodium in her diet  The patient states that she is having some minor leg swelling like to go back on her hydrochlorothiazide    She recently saw her cardiologist post hospital visit  I did recommend she contact their office for further recommendations of her medications for hypertension            Other    Dyspnea on exertion     The patient continues with some shortness of breath with exertion  She states this has has been ongoing since last year following either her COVID vaccine or her bout with COVID  She recently had a full cardiac workup while hospitalized including a cardiac catheterization which was negative  Patient's BMI is also 49  I did discuss with the patient that I can certainly refer her to the [place] [facility] however she would like to hold off on that at this time  Weight loss recommended  Hyperlipidemia     Patient was recently placed on Zetia  and Lipitor due to her cholesterol levels and ASCVD score  The patient states that she would like to come off of these medications due to leg cramping  She states the cardiologist spoke with her at her last visit and told her it would be okay to stop these and go on herbal supplementation  This is not noted in the cardiac note  I did recommend she contact our office before stopping her medications  Fatigue     Patient continues with complaints of fatigue and shortness of breath  She does have a history of iron deficiency anemia  Blood work has been ordered for the patient  Relevant Orders    TSH, 3rd generation with Free T4 reflex    Vitamin D 25 hydroxy    Pre-diabetes     Last hemoglobin A1c was 6 1  We will repeat this in several months  When hospitalized it was recommended that the patient start metformin as an out patient for prediabetes             Other Visit Diagnoses     Iron deficiency anemia, unspecified iron deficiency anemia type    -  Primary    Relevant Orders    CBC and differential    Iron Panel (Includes Ferritin, Iron Sat%, Iron, and TIBC)             Reason for visit is hospital follow up     Encounter provider Erwin Alejo, SARAH       Provider located at [address] [place] [id number]      Recent Visits  No visits were found meeting these conditions  Showing recent visits within past 7 days and meeting all other requirements  Today's Visits  Date Type Provider Dept   09/26/22 Telemedicine Genesis Padilla, [address] today's visits and meeting all other requirements  Future Appointments  No visits were found meeting these conditions  Showing future appointments within next 150 days and meeting all other requirements       After connecting through [employer], the patient was identified by name and date of birth  Christina Herring was informed that this is a telemedicine visit and that the visit is being conducted through [facility] and patient was informed this is a secure, HIPAA-complaint platform  She agrees to proceed     My office door was closed  No one else was in the room  She acknowledged consent and understanding of privacy and security of the video platform  The patient has agreed to participate and understands they can discontinue the visit at any time  Patient is aware this is a billable service  Subjective:     Patient ID: Christina Herring is a 47 y o  female  Recently hospitalized for chest pain  Following with cardiology  On lipitor and zetia  Is checking blood pressures at home 111/63  Cardiac cath performed while hospitalized  Still with symptoms of shortness of breath since last November, Walking or going up steps  Patient thinks related to [address] or COVID infection in March of 2022  Spoke to patient regarding long haulers       Review of Systems   Constitutional: Negative for activity change, fatigue and fever  HENT: Negative for congestion, hearing loss, rhinorrhea, trouble swallowing and voice change  Eyes: Negative for photophobia, pain, discharge and visual disturbance     Respiratory: Positive for shortness of breath (with exertion)  Negative for cough and chest tightness  Cardiovascular: Positive for leg swelling (slight)  Negative for chest pain and palpitations  Gastrointestinal: Negative for abdominal pain, blood in stool, constipation, nausea and vomiting  Endocrine: Negative for cold intolerance and heat intolerance  Genitourinary: Negative for difficulty urinating, frequency, hematuria, urgency, vaginal bleeding and vaginal discharge  Musculoskeletal: Negative for arthralgias and myalgias  Skin: Negative  Neurological: Negative for dizziness, weakness, numbness and headaches  Psychiatric/Behavioral: Negative for decreased concentration  The patient is not nervous/anxious  Objective: There were no vitals filed for this visit  Physical Exam  Constitutional:       General: She is not in acute distress  Appearance: Normal appearance  She is well-developed  She is obese  HENT:      Head: Normocephalic and atraumatic  Eyes:      Pupils: Pupils are equal, round, and reactive to light  Pulmonary:      Effort: Pulmonary effort is normal    Musculoskeletal:      Cervical back: Normal range of motion  Neurological:      Mental Status: She is alert and oriented to person, place, and time  Psychiatric:         Mood and Affect: Mood normal          Behavior: Behavior normal          Thought Content: Thought content normal          Judgment: Judgment normal              Transitional Care Management Review:  Victorina Reddy is a 47 y o  female here for TCM follow up       During the TCM phone call patient stated:    TCM Call     Date and time call was made  8/29/2022 10:44 AM    Patient [facility] hospitialized at  [facility]    Date of Admission  08/25/22    Date of discharge  08/26/22    Diagnosis  Chest pain    Disposition  Home    Were the patients medications reviewed and updated  No    Current Symptoms  None      TCM Call     Post hospital issues None    Should patient be enrolled in anticoag monitoring? No    Scheduled for follow up? Yes    Did you obtain your prescribed medications  Yes    Do you need help managing your prescriptions or medications  No    Is transportation to your appointment needed  No    I have advised the patient to call PCP with any new or worsening symptoms  [address] or [employer] other    Support System  Family; Spouse    Do you have social support  Yes, quite a bit    Are you recieving any outpatient services  No    Are you recieving home care services  No    Are you using any community resources  No    Current waiver services  No    Have you fallen in the last 12 months  No    Interperter language line needed  No    Counseling  Patient          I spent 20   minutes with the patient during this visit  SARAH Fofana      VIRTUAL VISIT DISCLAIMER    Reetha L Dubinkin verbally agrees to participate in [employer]  Pt is aware that [employer] could be limited without vital signs or the ability to perform a full hands-on physical Kvng Sander understands she or the provider may request at any time to terminate the video visit and request the patient to seek care or treatment in person

## 2022-09-26 NOTE — ASSESSMENT & PLAN NOTE
Last hemoglobin A1c was 6 1  We will repeat this in several months  When hospitalized it was recommended that the patient start metformin as an out patient for prediabetes

## 2022-09-26 NOTE — ASSESSMENT & PLAN NOTE
Patient was recently placed on Zetia  and Lipitor due to her cholesterol levels and ASCVD score  The patient states that she would like to come off of these medications due to leg cramping  She states the cardiologist spoke with her at her last visit and told her it would be okay to stop these and go on herbal supplementation  This is not noted in the cardiac note  I did recommend she contact our office before stopping her medications

## 2022-09-26 NOTE — ASSESSMENT & PLAN NOTE
Patient with history of obstructive sleep apnea  She did receive her replacement machine however she does not have supplies  I will refer her to pulmonology  She is compliant with her old machine

## 2022-09-26 NOTE — ASSESSMENT & PLAN NOTE
Patient continues with complaints of fatigue and shortness of breath  She does have a history of iron deficiency anemia  Blood work has been ordered for the patient

## 2022-09-28 ENCOUNTER — TELEPHONE (OUTPATIENT)
Dept: FAMILY MEDICINE CLINIC | Facility: CLINIC | Age: 52
End: 2022-09-28

## 2022-09-28 DIAGNOSIS — K21.9 GASTROESOPHAGEAL REFLUX DISEASE WITHOUT ESOPHAGITIS: ICD-10-CM

## 2022-09-28 NOTE — TELEPHONE ENCOUNTER
Patient needs to be seen in the office and have her blood pressure re-evaluated  I did a virtual visit with her the other day and she was going to follow-up with cardiology regarding her antihypertensive medications

## 2022-09-28 NOTE — TELEPHONE ENCOUNTER
Received request from [address] [place] [address] for hydroclorothiaz tab and losartan tab  Neither are listed as current meds

## 2022-10-04 NOTE — TELEPHONE ENCOUNTER
Patient called and left voice mail to schedule  I returned call but patient did not answer  Left second voicemail

## 2022-10-10 ENCOUNTER — OFFICE VISIT (OUTPATIENT)
Dept: FAMILY MEDICINE CLINIC | Facility: CLINIC | Age: 52
End: 2022-10-10
Payer: COMMERCIAL

## 2022-10-10 VITALS
HEART RATE: 76 BPM | RESPIRATION RATE: 16 BRPM | HEIGHT: 64 IN | BODY MASS INDEX: 50.02 KG/M2 | WEIGHT: 293 LBS | SYSTOLIC BLOOD PRESSURE: 122 MMHG | DIASTOLIC BLOOD PRESSURE: 70 MMHG | TEMPERATURE: 98.9 F

## 2022-10-10 DIAGNOSIS — I10 ESSENTIAL HYPERTENSION: Primary | ICD-10-CM

## 2022-10-10 DIAGNOSIS — E66.01 CLASS 3 SEVERE OBESITY WITH SERIOUS COMORBIDITY AND BODY MASS INDEX (BMI) OF 50.0 TO 59.9 IN ADULT, UNSPECIFIED OBESITY TYPE (HCC): ICD-10-CM

## 2022-10-10 DIAGNOSIS — R60.0 BILATERAL LEG EDEMA: ICD-10-CM

## 2022-10-10 DIAGNOSIS — R06.09 DYSPNEA ON EXERTION: ICD-10-CM

## 2022-10-10 DIAGNOSIS — K21.9 GASTROESOPHAGEAL REFLUX DISEASE WITHOUT ESOPHAGITIS: ICD-10-CM

## 2022-10-10 DIAGNOSIS — G47.33 OSA (OBSTRUCTIVE SLEEP APNEA): ICD-10-CM

## 2022-10-10 PROCEDURE — 99214 OFFICE O/P EST MOD 30 MIN: CPT | Performed by: NURSE PRACTITIONER

## 2022-10-10 RX ORDER — LOSARTAN POTASSIUM AND HYDROCHLOROTHIAZIDE 12.5; 5 MG/1; MG/1
1 TABLET ORAL DAILY
Qty: 90 TABLET | Refills: 3 | Status: SHIPPED | OUTPATIENT
Start: 2022-10-10

## 2022-10-10 NOTE — PROGRESS NOTES
[facility] - [facility]    NAME: Reetha L Dubinkin  AGE: 47 y o  SEX: female  : 1970     DATE: 10/10/2022     Assessment and Plan:     Problem List Items Addressed This Visit        Digestive    Gastroesophageal reflux disease without esophagitis     Continue protonix  Respiratory    MARKUS (obstructive sleep apnea)     Patient with history of obstructive sleep apnea  She did receive her replacement machine however she does not have supplies  I will refer her to pulmonology  She is compliant with her old machine  Cardiovascular and Mediastinum    Essential hypertension - Primary     Patient's blood pressure in the office today is 122/70  At the time of the patient's last video visit she was recently hospitalized and she was discharged on only her amlodipine  Her hydrochlorothiazide and losartan were stopped during her hospital stay  The patient is concerned regarding lower extremity swelling which is chronic in nature  Patient states she has been taking her losartan along with her amlodipine  I did recommend to the patient that we stop her amlodipine at this time  I will send Hyzaar to her pharmacy  5  She will continue to check her blood pressures at home  Low-salt diet recommended  Follow-up with cardiology recommended  Relevant Medications    losartan-hydrochlorothiazide (HYZAAR) 50-12 5 mg per tablet       Other    Bilateral leg edema     Chronic in nature  I will restart her hydrochlorothiazide at this time  Dyspnea on exertion     Shortness of breath on exertion continues  The patient recently had a cardiac catheterization which was normal   She was to make a follow-up with pulmonology which she has not done yet  Weight loss recommended         Class 3 severe obesity with serious comorbidity and body mass index (BMI) of 50 0 to 59 9 in adult (HCC)     BMI 50  Weight loss recommended                 BMI Counseling: Body mass index is 50 29 kg/m²  The BMI is above normal  Nutrition recommendations include decreasing portion sizes, encouraging healthy choices of fruits and vegetables, decreasing fast food intake, limiting drinks that contain sugar, moderation in carbohydrate intake, increasing intake of lean protein, reducing intake of saturated and trans fat and reducing intake of cholesterol  Exercise recommendations include moderate physical activity 150 minutes/week and exercising 3-5 times per week  Rationale for BMI follow-up plan is due to patient being overweight or obese  Return in about 4 weeks (around 11/7/2022) for recheck, Yael Mckeon  Chief Complaint:     Chief Complaint   Patient presents with   • Follow-up     Med management           History of Present Illness:     Patient presents to the office today for follow-up  She was recently hospitalized at which time a cardiac catheterization was performed due to ongoing shortness of breath and chest discomfort  This was negative  The patient has been reminded to make a Cardiology follow-up as well as a pulmonology follow-up  She continues with shortness of breath with exertion however her BMI is 50  She was discharged from the hospital on amlodipine however the patient restarted her losartan as well  Her blood pressure today is 122/70  I did adjust her blood pressure medications and this is discussed above  I will see her back in the office in a month to recheck her pressure     Review of Systems:     Review of Systems   Constitutional: Negative for activity change, fatigue and fever  HENT: Negative for congestion, hearing loss, rhinorrhea, trouble swallowing and voice change  Eyes: Negative for photophobia, pain, discharge and visual disturbance  Respiratory: Negative for cough, chest tightness and shortness of breath (with exertion)  Cardiovascular: Positive for leg swelling (chronic)  Negative for chest pain and palpitations  Gastrointestinal: Negative for abdominal pain, blood in stool, constipation, nausea and vomiting  Endocrine: Negative for cold intolerance and heat intolerance  Genitourinary: Negative for difficulty urinating, frequency, hematuria, urgency, vaginal bleeding and vaginal discharge  Musculoskeletal: Negative for arthralgias and myalgias  Skin: Negative  Neurological: Negative for dizziness, weakness, numbness and headaches  Psychiatric/Behavioral: Negative for decreased concentration  The patient is not nervous/anxious           Problem List:     Patient Active Problem List   Diagnosis   • Anxiety   • Bilateral leg edema   • Dyspnea on exertion   • Essential hypertension   • Hyperlipidemia   • Class 3 severe obesity due to excess calories without serious comorbidity with body mass index (BMI) of 50 0 to 59 9 in adult [facility])   • Osteoarthritis of knee   • Seasonal allergies   • Venous stasis dermatitis   • Sacroiliitis (HCC)   • Nausea   • Dizziness   • History of Helicobacter pylori infection   • Fatigue   • Vegetarian   • Gastroesophageal reflux disease without esophagitis   • Pre-diabetes   • Class 3 severe obesity with serious comorbidity and body mass index (BMI) of 50 0 to 59 9 in adult ([facility] [address] )   • MARKUS (obstructive sleep apnea)        Objective:     /70   Pulse 76   Temp 98 9 °F (37 2 °C) (Tympanic)   Resp 16   Ht 5' 4" (1 626 m)   Wt 133 kg (293 lb)   LMP 08/01/2021 (Approximate)   BMI 50 29 kg/m²     Current Outpatient Medications   Medication Sig Dispense Refill   • amLODIPine (NORVASC) 5 mg tablet Take 1 tablet (5 mg total) by mouth daily 90 tablet 3   • Cannabidiol 100 MG/ML SOLN Take by mouth daily as needed     • fluticasone (FLONASE) 50 mcg/act nasal spray 2 sprays into each nostril daily 16 g 3   • losartan-hydrochlorothiazide (HYZAAR) 50-12 5 mg per tablet Take 1 tablet by mouth daily 90 tablet 3   • Omega-3 Fatty Acids (OMEGA 3 500 PO) Take by mouth     • pantoprazole (PROTONIX) 40 mg tablet Take 1 tablet (40 mg total) by mouth daily 90 tablet 0     No current facility-administered medications for this visit  Physical Exam  Vitals reviewed  Constitutional:       Appearance: Normal appearance  She is obese  HENT:      Head: Normocephalic  Nose: Nose normal       Mouth/Throat:      Mouth: Mucous membranes are moist       Pharynx: Oropharynx is clear  Eyes:      Extraocular Movements: Extraocular movements intact  Pupils: Pupils are equal, round, and reactive to light  Cardiovascular:      Rate and Rhythm: Normal rate and regular rhythm  Pulses: Normal pulses  Heart sounds: Normal heart sounds  Pulmonary:      Effort: Pulmonary effort is normal       Breath sounds: Normal breath sounds  Musculoskeletal:         General: Normal range of motion  Skin:     General: Skin is warm and dry  Neurological:      General: No focal deficit present  Mental Status: She is alert and oriented to person, place, and time  Psychiatric:         Mood and Affect: Mood normal          Behavior: Behavior normal          Thought Content:  Thought content normal          Judgment: Judgment normal          Briana Plaza

## 2022-10-10 NOTE — ASSESSMENT & PLAN NOTE
Patient's blood pressure in the office today is 122/70  At the time of the patient's last video visit she was recently hospitalized and she was discharged on only her amlodipine  Her hydrochlorothiazide and losartan were stopped during her hospital stay  The patient is concerned regarding lower extremity swelling which is chronic in nature  Patient states she has been taking her losartan along with her amlodipine  I did recommend to the patient that we stop her amlodipine at this time  I will send Hyzaar to her pharmacy 50/12 5  She will continue to check her blood pressures at home  Low-salt diet recommended  Follow-up with cardiology recommended

## 2022-10-10 NOTE — PATIENT INSTRUCTIONS
Stop amlodipine  Start Hyzaar   Check blood pressure at home  If the blood pressure is consistently above 140 systolic or 90 diastolic call the office  DASH Eating Plan   WHAT YOU NEED TO KNOW:   The DASH (Dietary Approaches to Stop Hypertension) Eating Plan is designed to help prevent or lower high blood pressure  It can also help to lower LDL (bad) cholesterol and decrease your risk for heart disease  The plan is low in sodium, sugar, unhealthy fats, and total fat  It is high in potassium, calcium, magnesium, and fiber  These nutrients are added when you eat more fruits, vegetables, and whole grains  With the DASH eating plan, you need to eat a certain number of servings from each food group  This will help you get enough of certain nutrients and limit others  The amount of servings you should eat depends on how many calories you need  Your dietitian can help you create meal plans with the right number of servings for each food group  DISCHARGE INSTRUCTIONS:   What you need to know about sodium:  Your dietitian will tell you how much sodium is safe for you to have each day  People with high blood pressure should have no more than 1,500 to 2,300 mg of sodium in a day  A teaspoon (tsp) of salt has 2,300 mg of sodium  This may seem like a difficult goal, but small changes to the foods you eat can make a big difference  Your healthcare provider or dietitian can help you create a meal plan that follows your sodium limit  Read food labels  Food labels can help you choose foods that are low in sodium  The amount of sodium is listed in milligrams (mg)  The % Daily Value (DV) column tells you how much of your daily needs are met by 1 serving of the food for each nutrient listed  Choose foods that have less than 5% of the DV of sodium  These foods are considered low in sodium  Foods that have 20% or more of the DV of sodium are considered high in sodium   Avoid foods that have more than 300 mg of sodium in each serving  Choose foods that say low-sodium, reduced-sodium, or no salt added on the food label  Limit added salt  Do not salt food at the table if you add salt when you cook  Use herbs and spices, such as onions, garlic, and salt-free seasonings to add flavor  Try lemon or lime juice or vinegar to add a tart flavor  Use hot peppers or a small amount of hot pepper sauce to add a spicy flavor  Limit foods high in added salt, such as the following:    Seasonings made with salt, such as garlic salt, celery salt, onion salt, seasoned salt, meat tenderizers, and monosodium glutamate (MSG)    Miso soup and canned or dried soup mixes    Regular soy sauce, barbecue sauce, teriyaki sauce, steak sauce, Worcestershire sauce, and most flavored vinegars    Snack foods, such as salted chips, popcorn, pretzels, pork rinds, salted crackers, and salted nuts    Frozen foods, such as dinners, entrees, vegetables with sauces, and breaded meats    Ask about salt substitutes  Ask your healthcare provider if you may use salt substitutes  Some salt substitutes have ingredients that can be harmful if you have certain health conditions  Choose foods carefully at restaurants  Meals from restaurants, especially fast food restaurants, are often high in sodium  Some restaurants have nutrition information that tells you the amount of sodium in their foods  Ask to have your food prepared with less, or no salt  What you need to know about fats:  Healthy fats include unsaturated fats and omega-3 fatty acids  Unhealthy fats include saturated fats and trans fats    Include healthy fats, such as the following:      Cooking oils, such as soybean, canola, olive, or sunflower    Fatty fish, such as salmon, tuna, mackerel, or sardines    Flaxseed oil or ground flaxseed    ½ cup of cooked beans, such as black beans, kidney beans, or hester beans    1½ ounces of low-sodium nuts, such as almonds or walnuts    Low-sugar, low-sodium peanut butter    Seeds such as jenae seeds or sunflower seeds       Limit or do not have unhealthy fats, such as the following:      Foods that contain fat from animals, such as fatty meats, whole milk, butter, and cream    Shortening, stick margarine, palm oil, and coconut oil    Full-fat or creamy salad dressing    Creamy soup    Crackers, chips, and baked goods made with margarine or shortening    Foods that are fried in unhealthy fats    Gravy and sauces, such as Robert or cheese sauces    What you need to know about carbohydrates (carbs): All carbs break down into sugar  Complex carbs contain more fiber than simple carbs  This means complex carbs go into the bloodstream more slowly and cause less of a blood sugar spike  Try to include more complex carbs and fewer simple carbs  Include complex carbs, such as the followin slice of whole-grain bread    1 ounce of dry cereal that does not contain added sugar    ½ cup of cooked oatmeal    2 ounces of cooked whole-grain pasta    ½ cup of cooked brown rice    Limit or do not have simple carbs, such as the following:      [employer], such as doughnuts, pastries, and cookies    Mixes for cornbread and biscuits    White rice and pasta mixes, such as boxed macaroni and cheese    Instant and cold cereals that contain sugar    Jelly, jam, and ice cream that contain sugar    Condiments such as ketchup    Drinks high in sugar, such as soft drinks, lemonade, and fruit juice    What you need to know about vegetables and fruits:  Vegetables and fruits can be fresh, frozen, or canned  If possible, try to choose low-sodium canned options    Include a variety of vegetables and fruits, such as the followin medium apple, pear, or peach (about ½ cup chopped)    ½ small banana    ½ cup berries, such as blueberries, strawberries, or blackberries    1 cup of raw leafy greens, such as lettuce, spinach, kale, or prem greens    ½ cup of frozen or canned (no added salt) vegetables, such as green beans    ½ cup of fresh, frozen, or canned fruit (canned in light syrup or fruit juice)    ½ cup of vegetable or fruit juice    Limit or do not have vegetables and fruits made in the following ways:      Frozen fruit such as cherries that have added sugar    Fruit in cream or butter sauce    Canned vegetables that are high in sodium    Sauerkraut, pickled vegetables, and other foods prepared in brine    Fried vegetables or vegetables in butter or high-fat sauces    What you need to know about protein foods: Include lean or low-fat protein foods, such as the following:      Poultry (chicken, turkey) with no skin    Fish (especially fatty fish, such as salmon, fresh tuna, or mackerel)    Lean beef and pork (loin, round, extra lean hamburger)    Egg whites and egg substitutes    1 cup of nonfat (skim) or 1% milk    1½ ounces of fat-free or low-fat cheese    6 ounces of nonfat or low-fat yogurt    Limit or do not have high-fat protein foods, such as the following:      Smoked or cured meat, such as corned beef, lane, ham, hot dogs, and sausage    Canned beans and canned meats or spreads, such as potted meats, sardines, anchovies, and imitation seafood    Deli or lunch meats, such as bologna, ham, turkey, and roast beef    High-fat meat (T-bone steak, regular hamburger, and ribs)    Whole eggs and egg yolks    Whole milk, 2% milk, and cream    Regular cheese and processed cheese    Other guidelines to follow:   Maintain a healthy weight  Your risk for heart disease is higher if you are overweight  Your healthcare provider may suggest that you lose weight if you are overweight  You can lose weight by eating fewer calories and foods that have added sugars and fat  The DASH meal plan can help you do this  Decrease calories by eating smaller portions at each meal and fewer snacks  Ask your healthcare provider for more information about how to lose weight  Exercise regularly    Regular exercise can help you reach or maintain a healthy weight  Regular exercise can also help decrease your blood pressure and improve your cholesterol levels  Get 30 minutes or more of moderate exercise each day of the week  To lose weight, get at least 60 minutes of exercise  Talk to your healthcare provider about the best exercise program for you  Limit alcohol  Women should limit alcohol to 1 drink a day  Men should limit alcohol to 2 drinks a day  A drink of alcohol is 12 ounces of beer, 5 ounces of wine, or 1½ ounces of liquor  For more information:   [facility] and [address]  [address] [address]  [place] [address]  Phone: 9- [phone] - [phone]  Web Address: [facility] no    © 1413 [facility] 2022 Information is for End User's use only and may not be sold, redistributed or otherwise used for commercial purposes  All illustrations and images included in CareNotes® are the copyrighted property of A D A M , Inc  or [address] [place], [place] [address]   The above information is an  only  It is not intended as medical advice for individual conditions or treatments  Talk to your doctor, nurse or pharmacist before following any medical regimen to see if it is safe and effective for you

## 2022-10-10 NOTE — ASSESSMENT & PLAN NOTE
Shortness of breath on exertion continues  The patient recently had a cardiac catheterization which was normal   She was to make a follow-up with pulmonology which she has not done yet    Weight loss recommended

## 2022-10-23 DIAGNOSIS — K21.9 GASTROESOPHAGEAL REFLUX DISEASE WITHOUT ESOPHAGITIS: ICD-10-CM

## 2022-10-23 RX ORDER — PANTOPRAZOLE SODIUM 40 MG/1
TABLET, DELAYED RELEASE ORAL
Qty: 90 TABLET | Refills: 3 | Status: SHIPPED | OUTPATIENT
Start: 2022-10-23

## 2022-11-02 NOTE — TELEPHONE ENCOUNTER
LVM requesting a call back to schedule [facility] 
Patient scheduled for [facility] on 10/3/22 in [place]  Instructions sent to patient through [address]  Medication hold HCTZ  Can I have auth?
DISPLAY PLAN FREE TEXT

## 2022-11-14 ENCOUNTER — TELEPHONE (OUTPATIENT)
Dept: FAMILY MEDICINE CLINIC | Facility: CLINIC | Age: 52
End: 2022-11-14

## 2022-11-14 NOTE — TELEPHONE ENCOUNTER
Patient started on new BP med, Losartan HCTZ  States she has 4 pills left  Meds working well, her swelling is down and BP range is 130/65 highest and 109/55  Wanted to set up follow up 11/21/22, this is her day off from work  Kandy Dorsey is not in next Monday    Please advise      Pharmacy is [facility] [address]

## 2022-11-16 DIAGNOSIS — I10 ESSENTIAL HYPERTENSION: ICD-10-CM

## 2022-11-16 RX ORDER — LOSARTAN POTASSIUM AND HYDROCHLOROTHIAZIDE 12.5; 5 MG/1; MG/1
1 TABLET ORAL DAILY
Qty: 90 TABLET | Refills: 3 | Status: SHIPPED | OUTPATIENT
Start: 2022-11-16

## 2022-12-27 DIAGNOSIS — I10 ESSENTIAL HYPERTENSION: ICD-10-CM

## 2022-12-27 RX ORDER — LOSARTAN POTASSIUM AND HYDROCHLOROTHIAZIDE 12.5; 5 MG/1; MG/1
1 TABLET ORAL DAILY
Qty: 90 TABLET | Refills: 3 | Status: SHIPPED | OUTPATIENT
Start: 2022-12-27

## 2022-12-29 DIAGNOSIS — I10 ESSENTIAL HYPERTENSION: ICD-10-CM

## 2022-12-29 RX ORDER — LOSARTAN POTASSIUM AND HYDROCHLOROTHIAZIDE 12.5; 5 MG/1; MG/1
1 TABLET ORAL DAILY
Qty: 90 TABLET | Refills: 3 | OUTPATIENT
Start: 2022-12-29

## 2023-01-05 DIAGNOSIS — K21.9 GASTROESOPHAGEAL REFLUX DISEASE WITHOUT ESOPHAGITIS: ICD-10-CM

## 2023-01-05 RX ORDER — PANTOPRAZOLE SODIUM 40 MG/1
40 TABLET, DELAYED RELEASE ORAL DAILY
Qty: 90 TABLET | Refills: 3 | Status: SHIPPED | OUTPATIENT
Start: 2023-01-05

## 2023-02-13 DIAGNOSIS — I10 ESSENTIAL HYPERTENSION: ICD-10-CM

## 2023-02-13 RX ORDER — LOSARTAN POTASSIUM AND HYDROCHLOROTHIAZIDE 12.5; 5 MG/1; MG/1
1 TABLET ORAL DAILY
Qty: 90 TABLET | Refills: 3 | Status: SHIPPED | OUTPATIENT
Start: 2023-02-13

## 2023-03-04 ENCOUNTER — HOSPITAL ENCOUNTER (EMERGENCY)
Facility: HOSPITAL | Age: 53
Discharge: HOME | End: 2023-03-04
Attending: EMERGENCY MEDICINE

## 2023-03-04 ENCOUNTER — APPOINTMENT (EMERGENCY)
Dept: RADIOLOGY | Facility: HOSPITAL | Age: 53
End: 2023-03-04

## 2023-03-04 VITALS
OXYGEN SATURATION: 100 % | DIASTOLIC BLOOD PRESSURE: 80 MMHG | WEIGHT: 292.6 LBS | SYSTOLIC BLOOD PRESSURE: 136 MMHG | TEMPERATURE: 97.7 F | RESPIRATION RATE: 12 BRPM | HEART RATE: 60 BPM | BODY MASS INDEX: 50.22 KG/M2

## 2023-03-04 DIAGNOSIS — R07.9 CHEST PAIN: Primary | ICD-10-CM

## 2023-03-04 LAB
ALBUMIN SERPL BCP-MCNC: 4.1 G/DL (ref 3.5–5)
ALP SERPL-CCNC: 74 U/L (ref 46–116)
ALT SERPL W P-5'-P-CCNC: 74 U/L (ref 12–78)
ANION GAP SERPL CALCULATED.3IONS-SCNC: 2 MMOL/L (ref 4–13)
AST SERPL W P-5'-P-CCNC: 73 U/L (ref 5–45)
ATRIAL RATE: 69 BPM
BASOPHILS # BLD AUTO: 0.03 THOUSANDS/ÂΜL (ref 0–0.1)
BASOPHILS NFR BLD AUTO: 1 % (ref 0–1)
BILIRUB SERPL-MCNC: 0.6 MG/DL (ref 0.2–1)
BUN SERPL-MCNC: 13 MG/DL (ref 5–25)
CALCIUM SERPL-MCNC: 9.8 MG/DL (ref 8.3–10.1)
CARDIAC TROPONIN I PNL SERPL HS: 3 NG/L
CHLORIDE SERPL-SCNC: 105 MMOL/L (ref 96–108)
CO2 SERPL-SCNC: 30 MMOL/L (ref 21–32)
CREAT SERPL-MCNC: 0.74 MG/DL (ref 0.6–1.3)
EOSINOPHIL # BLD AUTO: 0.08 THOUSAND/ÂΜL (ref 0–0.61)
EOSINOPHIL NFR BLD AUTO: 2 % (ref 0–6)
ERYTHROCYTE [DISTWIDTH] IN BLOOD BY AUTOMATED COUNT: 12.7 % (ref 11.6–15.1)
GFR SERPL CREATININE-BSD FRML MDRD: 94 ML/MIN/1.73SQ M
GLUCOSE SERPL-MCNC: 118 MG/DL (ref 65–140)
HCT VFR BLD AUTO: 36.2 % (ref 34.8–46.1)
HGB BLD-MCNC: 11.7 G/DL (ref 11.5–15.4)
IMM GRANULOCYTES # BLD AUTO: 0.01 THOUSAND/UL (ref 0–0.2)
IMM GRANULOCYTES NFR BLD AUTO: 0 % (ref 0–2)
LYMPHOCYTES # BLD AUTO: 1.93 THOUSANDS/ÂΜL (ref 0.6–4.47)
LYMPHOCYTES NFR BLD AUTO: 39 % (ref 14–44)
MCH RBC QN AUTO: 28.9 PG (ref 26.8–34.3)
MCHC RBC AUTO-ENTMCNC: 32.3 G/DL (ref 31.4–37.4)
MCV RBC AUTO: 89 FL (ref 82–98)
MONOCYTES # BLD AUTO: 0.33 THOUSAND/ÂΜL (ref 0.17–1.22)
MONOCYTES NFR BLD AUTO: 7 % (ref 4–12)
NEUTROPHILS # BLD AUTO: 2.59 THOUSANDS/ÂΜL (ref 1.85–7.62)
NEUTS SEG NFR BLD AUTO: 51 % (ref 43–75)
NRBC BLD AUTO-RTO: 0 /100 WBCS
P AXIS: 59 DEGREES
PLATELET # BLD AUTO: 237 THOUSANDS/UL (ref 149–390)
PMV BLD AUTO: 9.9 FL (ref 8.9–12.7)
POTASSIUM SERPL-SCNC: 4 MMOL/L (ref 3.5–5.3)
PR INTERVAL: 170 MS
PROT SERPL-MCNC: 7.9 G/DL (ref 6.4–8.4)
QRS AXIS: 16 DEGREES
QRSD INTERVAL: 82 MS
QT INTERVAL: 384 MS
QTC INTERVAL: 411 MS
RBC # BLD AUTO: 4.05 MILLION/UL (ref 3.81–5.12)
SODIUM SERPL-SCNC: 137 MMOL/L (ref 135–147)
T WAVE AXIS: 38 DEGREES
VENTRICULAR RATE: 69 BPM
WBC # BLD AUTO: 4.97 THOUSAND/UL (ref 4.31–10.16)

## 2023-03-04 RX ORDER — FAMOTIDINE 10 MG/ML
20 INJECTION, SOLUTION INTRAVENOUS ONCE
Status: COMPLETED | OUTPATIENT
Start: 2023-03-04 | End: 2023-03-04

## 2023-03-04 RX ORDER — MAGNESIUM HYDROXIDE/ALUMINUM HYDROXICE/SIMETHICONE 120; 1200; 1200 MG/30ML; MG/30ML; MG/30ML
30 SUSPENSION ORAL ONCE
Status: COMPLETED | OUTPATIENT
Start: 2023-03-04 | End: 2023-03-04

## 2023-03-04 RX ADMIN — FAMOTIDINE 20 MG: 10 INJECTION INTRAVENOUS at 11:43

## 2023-03-04 RX ADMIN — ALUMINUM HYDROXIDE, MAGNESIUM HYDROXIDE, AND SIMETHICONE 30 ML: 200; 200; 20 SUSPENSION ORAL at 11:43

## 2023-03-04 NOTE — ED PROVIDER NOTES
History  Chief Complaint   Patient presents with   • Chest Pain     Pt reports worsening chest pain, pressure, and tightness  Pt reports burning sensation across the chest  Hx of cardiac cath 2022  HPI     62-year-old female with past medical history of GERD, hypertension, and obesity who presents for evaluation of chest pain  Patient states she has been having intermittent chest pain for the past few weeks  She states the pain is in the center of her chest with some radiation down below the left side of her rib cage  She states pain feels like a pressure sensation  Pain occasionally comes on when she is just sitting at rest   She also states pain seems to be worse with walking around and seems to be worse after eating  She states she has been taking Protonix which occasionally improves the pain  She has had associated shortness of breath and lightheadedness  Denies fevers or chills  Denies cough  Denies abdominal pain  She has had nausea but no vomiting or diarrhea  Denies leg pain or leg swelling  Denies history of DVT or PE  Denies any recent hospitalizations, surgeries, or prolonged immobilization  Patient states she has had similar symptoms in the past in 2022  Per chart review, patient had normal appearing coronary arteries  Prior to Admission Medications   Prescriptions Last Dose Informant Patient Reported? Taking?    Cannabidiol 100 MG/ML SOLN   Yes No   Sig: Take by mouth daily as needed   Omega-3 Fatty Acids (OMEGA 3 500 PO)   Yes No   Sig: Take by mouth   amLODIPine (NORVASC) 5 mg tablet   No No   Sig: Take 1 tablet (5 mg total) by mouth daily   fluticasone (FLONASE) 50 mcg/act nasal spray   No No   Si sprays into each nostril daily   losartan-hydrochlorothiazide (HYZAAR) 50-12 5 mg per tablet   No No   Sig: Take 1 tablet by mouth daily   pantoprazole (PROTONIX) 40 mg tablet   No No   Sig: Take 1 tablet (40 mg total) by mouth daily      Facility-Administered Medications: None       Past Medical History:   Diagnosis Date   • Anemia     Last Assessed: 18 June 2013   • Concussion    • Eczema    • Hyperlipidemia    • Hypertension    • Reactive airway disease    • Sleep apnea        Past Surgical History:   Procedure Laterality Date   • CARDIAC CATHETERIZATION N/A 8/26/2022    Procedure: Cardiac catheterization;  Surgeon: Jorden Coles MD;  Location: [facility]; Service: Cardiology   • CARDIAC CATHETERIZATION N/A 8/26/2022    Procedure: Cardiac Coronary Angiogram;  Surgeon: Jorden Coles MD;  Location: [facility]; Service: Cardiology   • TOOTH EXTRACTION     • UPPER GASTROINTESTINAL ENDOSCOPY         Family History   Problem Relation Age of Onset   • Anemia Father    • Hypertension Father    • Stroke Father         stroke syndrome   • Breast cancer Maternal Grandmother    • Diabetes Maternal Grandmother    • Heart attack Cousin      I have reviewed and agree with the history as documented  E-Cigarette/Vaping   • E-Cigarette Use Never User      E-Cigarette/Vaping Substances     Social History     Tobacco Use   • Smoking status: Never   • Smokeless tobacco: Never   Vaping Use   • Vaping Use: Never used   Substance Use Topics   • Alcohol use: No     Comment: Per [employer]: socially   • Drug use: No        Review of Systems   Constitutional: Negative for appetite change, chills and fever  HENT: Negative for congestion, rhinorrhea and sore throat  Respiratory: Positive for shortness of breath  Negative for cough  Cardiovascular: Positive for chest pain  Gastrointestinal: Positive for nausea  Negative for abdominal pain, diarrhea and vomiting  Musculoskeletal: Negative for arthralgias and myalgias  Skin: Negative for rash  Neurological: Positive for light-headedness  Negative for dizziness, weakness, numbness and headaches  All other systems reviewed and are negative        Physical Exam  ED Triage Vitals [03/04/23 1057]   Temperature Pulse Respirations Blood Pressure SpO2   97 7 °F (36 5 °C) 81 20 162/93 100 %      Temp Source Heart Rate Source Patient Position - Orthostatic VS BP Location FiO2 (%)   Oral Monitor Lying Right arm --      Pain Score       7             Orthostatic Vital Signs  Vitals:    03/04/23 1057 03/04/23 1142 03/04/23 1150 03/04/23 1300   BP: 162/93 (!) 152/111 134/75 136/80   Pulse: 81 74  60   Patient Position - Orthostatic VS: Lying Sitting         Physical Exam  Vitals and nursing note reviewed  Constitutional:       General: She is not in acute distress  Appearance: Normal appearance  She is well-developed  She is obese  She is not ill-appearing, toxic-appearing or diaphoretic  HENT:      Head: Normocephalic and atraumatic  Right Ear: External ear normal       Left Ear: External ear normal       Nose: Nose normal       Mouth/Throat:      Mouth: Mucous membranes are moist       Pharynx: Oropharynx is clear  Eyes:      Extraocular Movements: Extraocular movements intact  Conjunctiva/sclera: Conjunctivae normal    Cardiovascular:      Rate and Rhythm: Normal rate and regular rhythm  Pulses: Normal pulses  Heart sounds: Normal heart sounds  No murmur heard  No friction rub  No gallop  Pulmonary:      Effort: Pulmonary effort is normal  No respiratory distress  Breath sounds: Normal breath sounds  No decreased breath sounds, wheezing, rhonchi or rales  Abdominal:      General: There is no distension  Palpations: Abdomen is soft  Tenderness: There is no abdominal tenderness  There is no guarding or rebound  Musculoskeletal:         General: No tenderness  Cervical back: Neck supple  Right lower leg: No tenderness  No edema  Left lower leg: No tenderness  No edema  Skin:     General: Skin is warm and dry  Coloration: Skin is not pale  Findings: No erythema or rash  Neurological:      General: No focal deficit present        Mental Status: She is alert and oriented to person, place, and time  Cranial Nerves: No cranial nerve deficit  Sensory: No sensory deficit  Motor: No weakness     Psychiatric:         Mood and Affect: Mood normal          Behavior: Behavior normal          ED Medications  Medications   aluminum-magnesium hydroxide-simethicone (MYLANTA) oral suspension 30 mL (30 mL Oral Given 3/4/23 1143)   Famotidine (PF) (PEPCID) injection 20 mg (20 mg Intravenous Given 3/4/23 1143)       Diagnostic Studies  Results Reviewed     Procedure Component Value Units Date/Time    HS Troponin 0hr (reflex protocol) [[id number]]  (Normal) Collected: 03/04/23 1128    Lab Status: Final result Specimen: Blood from Arm, Left Updated: 03/04/23 1201     hs TnI 0hr 3 ng/L     Comprehensive metabolic panel [[id number]]  (Abnormal) Collected: 03/04/23 1128    Lab Status: Final result Specimen: Blood from Arm, Left Updated: 03/04/23 1201     Sodium 137 mmol/L      Potassium 4 0 mmol/L      Chloride 105 mmol/L      CO2 30 mmol/L      ANION GAP 2 mmol/L      BUN 13 mg/dL      Creatinine 0 74 mg/dL      Glucose 118 mg/dL      Calcium 9 8 mg/dL      AST 73 U/L      ALT 74 U/L      Alkaline Phosphatase 74 U/L      Total Protein 7 9 g/dL      Albumin 4 1 g/dL      Total Bilirubin 0 60 mg/dL      eGFR 94 ml/min/1 73sq m     Narrative:      Mirian guidelines for Chronic Kidney Disease (CKD):   •  Stage 1 with normal or high GFR (GFR > 90 mL/min/1 73 square meters)  •  Stage 2 Mild CKD (GFR = 60-89 mL/min/1 73 square meters)  •  Stage 3A Moderate CKD (GFR = 45-59 mL/min/1 73 square meters)  •  Stage 3B Moderate CKD (GFR = 30-44 mL/min/1 73 square meters)  •  Stage 4 Severe CKD (GFR = 15-29 mL/min/1 73 square meters)  •  Stage 5 End Stage CKD (GFR <15 mL/min/1 73 square meters)  Note: GFR calculation is accurate only with a steady state creatinine    CBC and differential [[id number]] Collected: 03/04/23 1128    Lab Status: Final result Specimen: Blood from Arm, Left Updated: 03/04/23 1138     WBC 4 97 Thousand/uL      RBC 4 05 Million/uL      Hemoglobin 11 7 g/dL      Hematocrit 36 2 %      MCV 89 fL      MCH 28 9 pg      MCHC 32 3 g/dL      RDW 12 7 %      MPV 9 9 fL      Platelets 544 Thousands/uL      nRBC 0 /100 WBCs      Neutrophils Relative 51 %      Immat GRANS % 0 %      Lymphocytes Relative 39 %      Monocytes Relative 7 %      Eosinophils Relative 2 %      Basophils Relative 1 %      Neutrophils Absolute 2 59 Thousands/µL      Immature Grans Absolute 0 01 Thousand/uL      Lymphocytes Absolute 1 93 Thousands/µL      Monocytes Absolute 0 33 Thousand/µL      Eosinophils Absolute 0 08 Thousand/µL      Basophils Absolute 0 03 Thousands/µL                  XR chest 2 views   ED Interpretation by Seda Medrano MD (03/04 1323)   No pneumonia  Final Result by Ambrosio Pryro MD (03/04 1636)      No acute cardiopulmonary disease                    Workstation performed: [id number]               Procedures  ECG 12 Lead Documentation Only    Date/Time: 3/4/2023 11:31 AM  Performed by: Annetta Valdes MD  Authorized by: Annetta Valdes MD     Indications / Diagnosis:  Chest pain  ECG reviewed by me, the ED Provider: yes    Patient location:  ED  Previous ECG:     Previous ECG:  Compared to current    Similarity:  No change    Comparison to cardiac monitor: Yes    Interpretation:     Interpretation: normal    Rate:     ECG rate:  69    ECG rate assessment: normal    Rhythm:     Rhythm: sinus rhythm    Ectopy:     Ectopy: none    QRS:     QRS axis:  Normal    QRS intervals:  Normal  Conduction:     Conduction: normal    ST segments:     ST segments:  Normal  T waves:     T waves: normal      US Guided Peripheral IV    Date/Time: 3/4/2023 6:56 PM  Performed by: Annetta Valdes MD  Authorized by: Annetta Valdes MD     Patient location:  ED  Performed by:  Resident  Other Assisting Provider: No    Indications:     Indications: difficulty obtaining IV access      Image availability:  Not saved  Procedure details:     Patient evaluated for contraindications to access (i e  fistula, thrombosis, etc): Yes      Standard clean technique used for ultrasound access: Yes      Location:  Left arm    Catheter size:  18 gauge    Number of attempts:  1    Successful placement: yes    Post-procedure details:     Post-procedure:  Dressing applied    Assessment: free fluid flow and no signs of infiltration      Post-procedure complications: none      Patient tolerance of procedure: Tolerated well, no immediate complications          ED Course             HEART Risk Score    Flowsheet Row Most Recent Value   Heart Score Risk Calculator    History 1 Filed at: 03/04/2023 1258   ECG 0 Filed at: 03/04/2023 1258   Age 1 Filed at: 03/04/2023 1258   Risk Factors 1 Filed at: 03/04/2023 1258   Troponin 0 Filed at: 03/04/2023 1258   HEART Score 3 Filed at: 03/04/2023 1258                      SBIRT 22yo+    Flowsheet Row Most Recent Value   SBIRT (23 yo +)    In order to provide better care to our patients, we are screening all of our patients for alcohol and drug use  Would it be okay to ask you these screening questions? Yes Filed at: 03/04/2023 1111   Initial Alcohol Screen: US AUDIT-C     1  How often do you have a drink containing alcohol? 1 Filed at: 03/04/2023 1111   2  How many drinks containing alcohol do you have on a typical day you are drinking? 0 Filed at: 03/04/2023 1111   3b  FEMALE Any Age, or MALE 65+: How often do you have 4 or more drinks on one occassion? 0 Filed at: 03/04/2023 1111   Audit-C Score 1 Filed at: 03/04/2023 1111   FRITZ: How many times in the past year have you    Used an illegal drug or used a prescription medication for non-medical reasons?  Never Filed at: 03/04/2023 1111                MDM     61-year-old female with past medical history of GERD, hypertension, and obesity who presents for evaluation of chest pain, she has had intermittent pressure type chest pain over the past few weeks, seems to be worse after eating but also sometimes comes on with exertion  Per chart review, patient had normal cardiac catheterization last year and had similar symptoms at that time  Differential diagnosis includes: ACS, GERD, esophageal dysmotility, anemia  We will obtain CBC to evaluate for anemia, CMP to evaluate liver function, renal function, and electrolytes, EKG and troponin to evaluate for ACS, and chest x-ray to evaluate for cardiomegaly, effusions  Will treat symptomatically with Pepcid and Maalox  Reviewed labs, no marked abnormalities  Troponin negative  Reviewed and interpreted chest x-ray, negative for acute cardiopulmonary disease  Reviewed EKG, shows normal sinus rhythm, no acute ischemic changes  Reassessed patient, pain is slightly improved  Given similar symptoms to last year and normal cardiac cath at that time and normal EKG and troponin currently, feel that the symptoms are likely GI related  Patient had normal EGD in the past, however this pain may be functional motility disorder  HEART score 3  Recommend taking Protonix daily as prescribed  We will have patient follow-up with GI  Discussed with patient strict return precautions  Patient expressed understanding and was agreeable for discharge  Disposition  Final diagnoses:   Chest pain     Time reflects when diagnosis was documented in both MDM as applicable and the Disposition within this note     Time User Action Codes Description Comment    3/4/2023 12:56 PM Everardo Angela Add [[id number]] Chest pain       ED Disposition     ED Disposition   Discharge    Condition   Stable    Date/Time   Sat Mar 4, 2023 12:51 PM    Comment   Reetha L Dubinkin discharge to home/self care                 Follow-up Information     Follow up With Specialties Details [facility] [id number] [facility]  Internal Medicine Schedule an appointment as soon as possible for a visit   [phone] [address]  [phone]            Discharge Medication List as of 3/4/2023  1:30 PM      CONTINUE these medications which have NOT CHANGED    Details   amLODIPine (NORVASC) 5 mg tablet Take 1 tablet (5 mg total) by mouth daily, Starting Mon 3/21/2022, Until Mon 10/10/2022, Normal      Cannabidiol 100 MG/ML SOLN Take by mouth daily as needed, Historical Med      fluticasone (FLONASE) 50 mcg/act nasal spray 2 sprays into each nostril daily, Starting Tue 3/24/2020, Normal      losartan-hydrochlorothiazide (HYZAAR) 50-12 5 mg per tablet Take 1 tablet by mouth daily, Starting Mon 2/13/2023, Normal      Omega-3 Fatty Acids (OMEGA 3 500 PO) Take by mouth, Historical Med      pantoprazole (PROTONIX) 40 mg tablet Take 1 tablet (40 mg total) by mouth daily, Starting Thu 1/5/2023, Normal           No discharge procedures on file  PDMP Review     None           ED Provider  Attending physically available and evaluated [facility]  I managed the patient along with the ED Attending      Electronically Signed by         Mason Boykin MD  03/04/23 6242

## 2023-03-04 NOTE — ED ATTENDING ATTESTATION
Final Diagnoses:     1  Chest pain      ED Course as of 03/04/23 1324   Sat Mar 04, 2023   1123 Procedure Note: EKG  Date/Time: 03/04/23 11:23 AM   Interpreted by: Radha Magdaleno   Indications / Diagnosis: CP  ECG reviewed by me, the ED Provider: yes   The EKG demonstrates:  Rhythm: HR 69 normal sinus  Intervals: normal intervals  Axis: normal axis  QRS/Blocks: low voltage, otherwise normal QRS  ST Changes: No acute ST Changes, no STD/DANIAL  Similar to previosu Aug 2022   1147 POCUS Cardiac + Lung + IVC:  - transthoracic echocardiogram was performed at bedside by myself   - Images collected were inadequate quality  This was a technically difficult study due to lung interference  - Apical, parasternal, subcostal views were obtained/attempted  - The main purpose of this study was to r/o obvious pericardial tamponade  - Most views were obtained for educational reasons    - Findings: There was no obvious pericardial effusion   There was no obvious pleural effusion  LV function / EF grossly normal appearing but very poorly visualized     EPSS 5mm   RV function grossly normal appearing  IVC was not visualized  Lungs:    B-lines were faintly present LEFT anteriorly localized to a questionable subpleural abnormality  Everywhere else appeared normal      Bilateral anterior lung sliding present  Summary: Grossly normal heart without pericardial effusion  Milbert Severance MD, saw and evaluated the patient  All available labs and X-rays were ordered by me or the resident and have been reviewed by myself  I discussed the patient with the resident / non-physician and agree with the resident's / non-physician practitioner's findings and plan as documented in the resident's / non-physician practicitioner's note, except where noted  At this point, I agree with the current assessment done in the ED  I was present during key portions of all procedures performed unless otherwise stated  Nursing Triage:     Chief Complaint   Patient presents with   • Chest Pain     Pt reports worsening chest pain, pressure, and tightness  Pt reports burning sensation across the chest  Hx of cardiac cath 8/2022  HPI:   This is a 47 y o  female presenting for evaluation of CP  Intermittent for weeks, middle of chest going to the LEFT side below the rib cage  Burning sometimes, pressure sometimes  Worse after eating but also comes on with walking  Can't tolerate same degree of exercise as previously, has exertional symptoms  +intermittent LH  No belly pain  No diarrhea  No leg pain/swelling  Hx of similar in the past ? cardiac cath that was negative in the past    Takes Protonix very intermittently  PMH: GERD, obesity    ASSESSMENT + PLAN:   CP:  - Given patient's concerns, will do a cardiac workup  - Will do an EKG for arrythmia, strain; troponin for same as per protocol for evaluation of ACS  - CBC for anemia; CMP for kidney function and electrolytes  - Will check CXR for pneumonia, PTX, fluid overload  - Will do POCUS Cardiac to evaluate for pericardial effusion    - Disposition per workup  HEART score:  History 0=Slightly or non-suspicious   ECG 0=Normal   Age 1= > 45 - <65 years   Risk Factors 1= 1 or 2 risk factors   Troponin 0= Less than or equal to 12 ng/L   Total 2     Physical:   Pertinent: Large legs without edema  General: VS reviewed  Appears in NAD  awake, alert  Well-nourished, well-developed  Appears stated age  Speaking normally in full sentences  Head: Normocephalic, atraumatic  Eyes: EOM-I  No diplopia  No hyphema  No subconjunctival hemorrhages  Symmetrical lids  ENT: Atraumatic external nose and ears  MMM  No malocclusion  No stridor  Normal phonation  No drooling  Normal swallowing  Neck: No JVD  CV: No pallor noted  Lungs:   No tachypnea  No respiratory distress  Abd: soft nt nd no rebound/guarding  MSK:   FROM spontaneously  Skin: Dry, intact  Neuro: Awake, alert, GCS15, CN II-XII grossly intact  Motor grossly intact  Psychiatric/Behavioral: interacting normally; appropriate mood/affect   Exam: deferred    Vitals:    03/04/23 1057 03/04/23 1142 03/04/23 1150 03/04/23 1300   BP: 162/93 (!) 152/111 134/75 136/80   BP Location: Right arm Right arm     Pulse: 81 74  60   Resp: 20 21  12   Temp: 97 7 °F (36 5 °C)      TempSrc: Oral      SpO2: 100% 99%  100%   Weight: 133 kg (292 lb 9 6 oz)        - There are no obvious limitations to social determinants of care  - Nursing note reviewed  - Vitals reviewed  - Orders placed by myself and/or advanced practitioner / resident     - Previous chart was reviewed  - No language barrier    - History obtained from patient  - There are no limitations to the history obtained:     Past Medical:    has a past medical history of Anemia, Concussion, Eczema, Hyperlipidemia, Hypertension, Reactive airway disease, and Sleep apnea  Past Surgical:    has a past surgical history that includes Tooth extraction; Upper gastrointestinal endoscopy; Cardiac catheterization (N/A, 8/26/2022); and Cardiac catheterization (N/A, 8/26/2022)  Social:     Social History     Substance and Sexual Activity   Alcohol Use No    Comment: Per [employer]: socially     Social History     Tobacco Use   Smoking Status Never   Smokeless Tobacco Never     Social History     Substance and Sexual Activity   Drug Use No       Echo:   No results found for this or any previous visit  No results found for this or any previous visit  Cath:    No results found for this or any previous visit        Code Status: Prior  Advance Directive and Living Will:      Power of :    POLST:    Medications   aluminum-magnesium hydroxide-simethicone (MYLANTA) oral suspension 30 mL (30 mL Oral Given 3/4/23 1143)   Famotidine (PF) (PEPCID) injection 20 mg (20 mg Intravenous Given 3/4/23 1143)     XR chest 2 views   ED Interpretation   No pneumonia  Orders Placed This Encounter   Procedures   • ED ECG Documentation Only   • XR chest 2 views   • CBC and differential   • Comprehensive metabolic panel   • HS Troponin 0hr (reflex protocol)   • HS Troponin I 2hr   • HS Troponin I 4hr   • Insert peripheral IV   • ECG 12 lead   • ECG 12 lead     Labs Reviewed   COMPREHENSIVE METABOLIC PANEL - Abnormal       Result Value Ref Range Status    Sodium 137  135 - 147 mmol/L Final    Potassium 4 0  3 5 - 5 3 mmol/L Final    Chloride 105  96 - 108 mmol/L Final    CO2 30  21 - 32 mmol/L Final    ANION GAP 2 (*) 4 - 13 mmol/L Final    BUN 13  5 - 25 mg/dL Final    Creatinine 0 74  0 60 - 1 30 mg/dL Final    Comment: Standardized to IDMS reference method    Glucose 118  65 - 140 mg/dL Final    Comment: If the patient is fasting, the ADA then defines impaired fasting glucose as > 100 mg/dL and diabetes as > or equal to 123 mg/dL  Specimen collection should occur prior to Sulfasalazine administration due to the potential for falsely depressed results  Specimen collection should occur prior to Sulfapyridine administration due to the potential for falsely elevated results  Calcium 9 8  8 3 - 10 1 mg/dL Final    AST 73 (*) 5 - 45 U/L Final    Comment: Specimen collection should occur prior to Sulfasalazine administration due to the potential for falsely depressed results  ALT 74  12 - 78 U/L Final    Comment: Specimen collection should occur prior to Sulfasalazine and/or Sulfapyridine administration due to the potential for falsely depressed results  Alkaline Phosphatase 74  46 - 116 U/L Final    Total Protein 7 9  6 4 - 8 4 g/dL Final    Albumin 4 1  3 5 - 5 0 g/dL Final    Total Bilirubin 0 60  0 20 - 1 00 mg/dL Final    Comment: Use of this assay is not recommended for patients undergoing treatment with eltrombopag due to the potential for falsely elevated results      eGFR 94  ml/min/1 73sq m Final    Narrative:     [place] guidelines for Chronic Kidney Disease (CKD):   •  Stage 1 with normal or high GFR (GFR > 90 mL/min/1 73 square meters)  •  Stage 2 Mild CKD (GFR = 60-89 mL/min/1 73 square meters)  •  Stage 3A Moderate CKD (GFR = 45-59 mL/min/1 73 square meters)  •  Stage 3B Moderate CKD (GFR = 30-44 mL/min/1 73 square meters)  •  Stage 4 Severe CKD (GFR = 15-29 mL/min/1 73 square meters)  •  Stage 5 End Stage CKD (GFR <15 mL/min/1 73 square meters)  Note: GFR calculation is accurate only with a steady state creatinine   HS TROPONIN I 0HR - Normal    hs TnI 0hr 3  "Refer to ACS Flowchart"- see link ng/L Final    Comment:                                              Initial (time 0) result  If >=50 ng/L, Myocardial injury suggested ;  Type of myocardial injury and treatment strategy  to be determined  If 5-49 ng/L, a delta result at 2 hours and or 4 hours will be needed to further evaluate  If <4 ng/L, and chest pain has been >3 hours since onset, patient may qualify for discharge based on the HEART score in the ED  If <5 ng/L and <3hours since onset of chest pain, a delta result at 2 hours will be needed to further evaluate  HS Troponin 99th Percentile URL of a Health Population=12 ng/L with a 95% Confidence Interval of 8-18 ng/L  Second Troponin (time 2 hours)  If calculated delta >= 20 ng/L,  Myocardial injury suggested ; Type of myocardial injury and treatment strategy to be determined  If 5-49 ng/L and the calculated delta is 5-19 ng/L, consult medical service for evaluation  Continue evaluation for ischemia on ecg and other possible etiology and repeat hs troponin at 4 hours  If delta is <5 ng/L at 2 hours, consider discharge based on risk stratification via the HEART score (if in ED), or [place] risk score in IP/Observation      HS Troponin 99th Percentile URL of a Health Population=12 ng/L with a 95% Confidence Interval of 8-18 ng/L    CBC AND DIFFERENTIAL    WBC 4 97  4 31 - 10 16 Thousand/uL Final    RBC 4 05  3 81 - 5 12 Million/uL Final    Hemoglobin 11 7  11 5 - 15 4 g/dL Final    Hematocrit 36 2  34 8 - 46 1 % Final    MCV 89  82 - 98 fL Final    MCH 28 9  26 8 - 34 3 pg Final    MCHC 32 3  31 4 - 37 4 g/dL Final    RDW 12 7  11 6 - 15 1 % Final    MPV 9 9  8 9 - 12 7 fL Final    Platelets 275  873 - 390 Thousands/uL Final    nRBC 0  /100 WBCs Final    Neutrophils Relative 51  43 - 75 % Final    Immat GRANS % 0  0 - 2 % Final    Lymphocytes Relative 39  14 - 44 % Final    Monocytes Relative 7  4 - 12 % Final    Eosinophils Relative 2  0 - 6 % Final    Basophils Relative 1  0 - 1 % Final    Neutrophils Absolute 2 59  1 85 - 7 62 Thousands/µL Final    Immature Grans Absolute 0 01  0 00 - 0 20 Thousand/uL Final    Lymphocytes Absolute 1 93  0 60 - 4 47 Thousands/µL Final    Monocytes Absolute 0 33  0 17 - 1 22 Thousand/µL Final    Eosinophils Absolute 0 08  0 00 - 0 61 Thousand/µL Final    Basophils Absolute 0 03  0 00 - 0 10 Thousands/µL Final   HS TROPONIN I 2HR   HS TROPONIN I 4HR     Time reflects when diagnosis was documented in both MDM as applicable and the Disposition within this note     Time User Action Codes Description Comment    3/4/2023 12:56 PM Felipe Barnett Add [R07 9] Chest pain       ED Disposition     ED Disposition   Discharge    Condition   Stable    Date/Time   Sat Mar 4, 2023 1251    [facility] discharge to home/self care  Follow-up Information     Follow up With Specialties Details Why [address] Internal Medicine Schedule an appointment as soon as possible for a visit   [address] [facility]  [phone]          Patient's Medications   Discharge Prescriptions    No medications on file     No discharge procedures on file  Prior to Admission Medications   Prescriptions Last Dose Informant Patient Reported? Taking?    Cannabidiol 100 MG/ML SOLN   Yes No   Sig: Take by mouth daily as needed   Omega-3 Fatty Acids (OMEGA 3 500 PO)   Yes No Sig: Take by mouth   amLODIPine (NORVASC) 5 mg tablet   No No   Sig: Take 1 tablet (5 mg total) by mouth daily   fluticasone (FLONASE) 50 mcg/act nasal spray   No No   Si sprays into each nostril daily   losartan-hydrochlorothiazide (HYZAAR) 50-12 5 mg per tablet   No No   Sig: Take 1 tablet by mouth daily   pantoprazole (PROTONIX) 40 mg tablet   No No   Sig: Take 1 tablet (40 mg total) by mouth daily      Facility-Administered Medications: None     HEART Risk Score    Flowsheet Row Most Recent Value   Heart Score Risk Calculator    History 1 Filed at: 2023 1258   ECG 0 Filed at: 2023 1258   Age 1 Filed at: 2023 1258   Risk Factors 1 Filed at: 2023 1258   Troponin 0 Filed at: 2023 1258   HEART Score 3 Filed at: 2023 1258                         Portions of the record may have been created with voice recognition software  Occasional wrong word or "sound a like" substitutions may have occurred due to the inherent limitations of voice recognition software  Read the chart carefully and recognize, using context, where substitutions have occurred      Electronically signed by:  Prasanna Vernon

## 2023-05-14 ENCOUNTER — APPOINTMENT (EMERGENCY)
Dept: NON INVASIVE DIAGNOSTICS | Facility: HOSPITAL | Age: 53
End: 2023-05-14

## 2023-05-14 ENCOUNTER — HOSPITAL ENCOUNTER (EMERGENCY)
Facility: HOSPITAL | Age: 53
Discharge: HOME | End: 2023-05-14
Attending: EMERGENCY MEDICINE

## 2023-05-14 ENCOUNTER — APPOINTMENT (EMERGENCY)
Dept: RADIOLOGY | Facility: HOSPITAL | Age: 53
End: 2023-05-14

## 2023-05-14 VITALS
RESPIRATION RATE: 13 BRPM | DIASTOLIC BLOOD PRESSURE: 79 MMHG | SYSTOLIC BLOOD PRESSURE: 119 MMHG | HEART RATE: 60 BPM | TEMPERATURE: 97.2 F | OXYGEN SATURATION: 99 %

## 2023-05-14 DIAGNOSIS — R07.9 CHEST PAIN: ICD-10-CM

## 2023-05-14 DIAGNOSIS — M79.605 LOWER EXTREMITY PAIN, BILATERAL: ICD-10-CM

## 2023-05-14 DIAGNOSIS — M79.605 PAIN IN BOTH LOWER EXTREMITIES: Primary | ICD-10-CM

## 2023-05-14 DIAGNOSIS — M79.604 LOWER EXTREMITY PAIN, BILATERAL: ICD-10-CM

## 2023-05-14 DIAGNOSIS — R42 EPISODIC LIGHTHEADEDNESS: ICD-10-CM

## 2023-05-14 DIAGNOSIS — M79.604 PAIN IN BOTH LOWER EXTREMITIES: Primary | ICD-10-CM

## 2023-05-14 LAB
2HR DELTA HS TROPONIN: 0 NG/L
ANION GAP SERPL CALCULATED.3IONS-SCNC: 3 MMOL/L (ref 4–13)
ATRIAL RATE: 55 BPM
ATRIAL RATE: 75 BPM
BASOPHILS # BLD AUTO: 0.02 THOUSANDS/ÂΜL (ref 0–0.1)
BASOPHILS NFR BLD AUTO: 0 % (ref 0–1)
BUN SERPL-MCNC: 10 MG/DL (ref 5–25)
CALCIUM SERPL-MCNC: 9.2 MG/DL (ref 8.3–10.1)
CARDIAC TROPONIN I PNL SERPL HS: 3 NG/L
CARDIAC TROPONIN I PNL SERPL HS: 3 NG/L
CHLORIDE SERPL-SCNC: 106 MMOL/L (ref 96–108)
CO2 SERPL-SCNC: 27 MMOL/L (ref 21–32)
CREAT SERPL-MCNC: 0.76 MG/DL (ref 0.6–1.3)
EOSINOPHIL # BLD AUTO: 0.08 THOUSAND/ÂΜL (ref 0–0.61)
EOSINOPHIL NFR BLD AUTO: 2 % (ref 0–6)
ERYTHROCYTE [DISTWIDTH] IN BLOOD BY AUTOMATED COUNT: 12.6 % (ref 11.6–15.1)
GFR SERPL CREATININE-BSD FRML MDRD: 91 ML/MIN/1.73SQ M
GLUCOSE SERPL-MCNC: 118 MG/DL (ref 65–140)
HCT VFR BLD AUTO: 35.1 % (ref 34.8–46.1)
HGB BLD-MCNC: 11.2 G/DL (ref 11.5–15.4)
IMM GRANULOCYTES # BLD AUTO: 0.01 THOUSAND/UL (ref 0–0.2)
IMM GRANULOCYTES NFR BLD AUTO: 0 % (ref 0–2)
LYMPHOCYTES # BLD AUTO: 2.01 THOUSANDS/ÂΜL (ref 0.6–4.47)
LYMPHOCYTES NFR BLD AUTO: 40 % (ref 14–44)
MCH RBC QN AUTO: 28.6 PG (ref 26.8–34.3)
MCHC RBC AUTO-ENTMCNC: 31.9 G/DL (ref 31.4–37.4)
MCV RBC AUTO: 90 FL (ref 82–98)
MONOCYTES # BLD AUTO: 0.34 THOUSAND/ÂΜL (ref 0.17–1.22)
MONOCYTES NFR BLD AUTO: 7 % (ref 4–12)
NEUTROPHILS # BLD AUTO: 2.51 THOUSANDS/ÂΜL (ref 1.85–7.62)
NEUTS SEG NFR BLD AUTO: 51 % (ref 43–75)
NRBC BLD AUTO-RTO: 0 /100 WBCS
P AXIS: 39 DEGREES
P AXIS: 69 DEGREES
PLATELET # BLD AUTO: 215 THOUSANDS/UL (ref 149–390)
PMV BLD AUTO: 9.7 FL (ref 8.9–12.7)
POTASSIUM SERPL-SCNC: 4 MMOL/L (ref 3.5–5.3)
PR INTERVAL: 168 MS
PR INTERVAL: 170 MS
QRS AXIS: 39 DEGREES
QRS AXIS: 39 DEGREES
QRSD INTERVAL: 80 MS
QRSD INTERVAL: 86 MS
QT INTERVAL: 394 MS
QT INTERVAL: 428 MS
QTC INTERVAL: 409 MS
QTC INTERVAL: 439 MS
RBC # BLD AUTO: 3.91 MILLION/UL (ref 3.81–5.12)
SODIUM SERPL-SCNC: 136 MMOL/L (ref 135–147)
T WAVE AXIS: 13 DEGREES
T WAVE AXIS: 50 DEGREES
VENTRICULAR RATE: 55 BPM
VENTRICULAR RATE: 75 BPM
WBC # BLD AUTO: 4.97 THOUSAND/UL (ref 4.31–10.16)

## 2023-05-14 NOTE — ED PROVIDER NOTES
History  Chief Complaint   Patient presents with   • Shortness of Breath     Ongoing since thursday   • Chest Pain     Left anterior, non-radiating   • Dizziness     36-year-old female past medical history significant for hyperlipidemia, hypertension presenting to ED today for symptoms of chest pain, lightheadedness, leg cramps  Patient states that she has been getting bilateral leg cramps that have been waking her up from her sleep for the last couple days  She states that today as well as she started with left-sided chest pain  She says that this issue has been chronic and that she has been getting recurrent chest pain however today she had new chest pain  She says is located underneath the left breast   She denies any radiation anywhere else  She also states that she has episodes where she gets lightheaded while she is walking  Denies any nausea or vomiting  No diaphoresis  She is concerned about blood clots at this time in her legs as well  She also did show me 2 areas on her leg  She states that the one on the left leg she is not sure when she got but she also had a spot on the right leg and she was worried about blood clots from those spots  Prior to Admission Medications   Prescriptions Last Dose Informant Patient Reported? Taking?    Cannabidiol 100 MG/ML SOLN   Yes No   Sig: Take by mouth daily as needed   Omega-3 Fatty Acids (OMEGA 3 500 PO)   Yes No   Sig: Take by mouth   amLODIPine (NORVASC) 5 mg tablet   No No   Sig: Take 1 tablet (5 mg total) by mouth daily   fluticasone (FLONASE) 50 mcg/act nasal spray   No No   Si sprays into each nostril daily   losartan-hydrochlorothiazide (HYZAAR) 50-12 5 mg per tablet   No No   Sig: Take 1 tablet by mouth daily   pantoprazole (PROTONIX) 40 mg tablet   No No   Sig: Take 1 tablet (40 mg total) by mouth daily      Facility-Administered Medications: None       Past Medical History:   Diagnosis Date   • Anemia     Last Assessed: 2013   • Concussion    • Eczema    • Hyperlipidemia    • Hypertension    • Reactive airway disease    • Sleep apnea        Past Surgical History:   Procedure Laterality Date   • CARDIAC CATHETERIZATION N/A 8/26/2022    Procedure: Cardiac catheterization;  Surgeon: Marianne Villatoro MD;  Location: [facility]; Service: Cardiology   • CARDIAC CATHETERIZATION N/A 8/26/2022    Procedure: Cardiac Coronary Angiogram;  Surgeon: Marianne Villatoro MD;  Location: [facility]; Service: Cardiology   • TOOTH EXTRACTION     • UPPER GASTROINTESTINAL ENDOSCOPY         Family History   Problem Relation Age of Onset   • Anemia Father    • Hypertension Father    • Stroke Father         stroke syndrome   • Breast cancer Maternal Grandmother    • Diabetes Maternal Grandmother    • Heart attack Cousin      I have reviewed and agree with the history as documented  E-Cigarette/Vaping   • E-Cigarette Use Never User      E-Cigarette/Vaping Substances     Social History     Tobacco Use   • Smoking status: Never   • Smokeless tobacco: Never   Vaping Use   • Vaping Use: Never used   Substance Use Topics   • Alcohol use: No     Comment: Per Allscripts: socially   • Drug use: No        Review of Systems   Constitutional: Negative for chills and fever  HENT: Negative for hearing loss  Eyes: Negative for visual disturbance  Respiratory: Negative for shortness of breath  Cardiovascular: Positive for chest pain  Gastrointestinal: Negative for abdominal pain, constipation, diarrhea, nausea and vomiting  Genitourinary: Negative for difficulty urinating  Musculoskeletal: Negative for myalgias  Skin: Negative for color change  Neurological: Positive for light-headedness  Negative for dizziness and headaches  Psychiatric/Behavioral: Negative for agitation  All other systems reviewed and are negative        Physical Exam  ED Triage Vitals [05/14/23 0906]   Temperature Pulse Respirations Blood Pressure SpO2   (!) 97 2 °F (36 2 °C) 77 18 132/87 100 %      Temp Source Heart Rate Source Patient Position - Orthostatic VS BP Location FiO2 (%)   Temporal Monitor Lying Left arm --      Pain Score       4             Orthostatic Vital Signs  Vitals:    05/14/23 0906 05/14/23 1000   BP: 132/87 119/79   Pulse: 77 60   Patient Position - Orthostatic VS: Lying Lying       Physical Exam  Vitals and nursing note reviewed  Constitutional:       General: She is not in acute distress  Appearance: Normal appearance  She is well-developed  She is not ill-appearing  HENT:      Head: Normocephalic and atraumatic  Right Ear: External ear normal       Left Ear: External ear normal       Nose: Nose normal  No congestion  Mouth/Throat:      Mouth: Mucous membranes are moist       Pharynx: Oropharynx is clear  No oropharyngeal exudate  Eyes:      General:         Right eye: No discharge  Left eye: No discharge  Extraocular Movements: Extraocular movements intact  Conjunctiva/sclera: Conjunctivae normal       Pupils: Pupils are equal, round, and reactive to light  Cardiovascular:      Rate and Rhythm: Normal rate and regular rhythm  Heart sounds: Normal heart sounds  No murmur heard  No friction rub  No gallop  Pulmonary:      Effort: Pulmonary effort is normal  No respiratory distress  Breath sounds: Normal breath sounds  No stridor  No wheezing  Abdominal:      General: Bowel sounds are normal  There is no distension  Palpations: Abdomen is soft  Tenderness: There is no abdominal tenderness  Musculoskeletal:         General: No swelling  Normal range of motion  Cervical back: Normal range of motion and neck supple  No rigidity  Comments: No posterior calf tenderness present  No unilateral swelling present  Skin:     General: Skin is warm and dry  Capillary Refill: Capillary refill takes less than 2 seconds        Comments: Seborrheic keratosis present on the right anterior thigh   Small area at the left medial knee which looks like a either old insect bite or area that the patient may have scratched   Neurological:      General: No focal deficit present  Mental Status: She is alert and oriented to person, place, and time  Mental status is at baseline  Cranial Nerves: No cranial nerve deficit  Motor: No weakness        Gait: Gait normal    Psychiatric:         Mood and Affect: Mood normal          Behavior: Behavior normal          ED Medications  Medications - No data to display    Diagnostic Studies  Results Reviewed     Procedure Component Value Units Date/Time    HS Troponin I 2hr [[id number]]  (Normal) Collected: 05/14/23 1122    Lab Status: Final result Specimen: Blood from Arm, Left Updated: 05/14/23 1207     hs TnI 2hr 3 ng/L      Delta 2hr hsTnI 0 ng/L     HS Troponin 0hr (reflex protocol) [[id number]]  (Normal) Collected: 05/14/23 0930    Lab Status: Final result Specimen: Blood from Arm, Left Updated: 05/14/23 1011     hs TnI 0hr 3 ng/L     Basic metabolic panel [[id number]]  (Abnormal) Collected: 05/14/23 0930    Lab Status: Final result Specimen: Blood from Arm, Left Updated: 05/14/23 0954     Sodium 136 mmol/L      Potassium 4 0 mmol/L      Chloride 106 mmol/L      CO2 27 mmol/L      ANION GAP 3 mmol/L      BUN 10 mg/dL      Creatinine 0 76 mg/dL      Glucose 118 mg/dL      Calcium 9 2 mg/dL      eGFR 91 ml/min/1 73sq m     Narrative:      [place] guidelines for Chronic Kidney Disease (CKD):   •  Stage 1 with normal or high GFR (GFR > 90 mL/min/1 73 square meters)  •  Stage 2 Mild CKD (GFR = 60-89 mL/min/1 73 square meters)  •  Stage 3A Moderate CKD (GFR = 45-59 mL/min/1 73 square meters)  •  Stage 3B Moderate CKD (GFR = 30-44 mL/min/1 73 square meters)  •  Stage 4 Severe CKD (GFR = 15-29 mL/min/1 73 square meters)  •  Stage 5 End Stage CKD (GFR <15 mL/min/1 73 square meters)  Note: GFR calculation is accurate only with a steady state creatinine    CBC and differential [[id number]]  (Abnormal) Collected: 05/14/23 0930    Lab Status: Final result Specimen: Blood from Arm, Left Updated: 05/14/23 0938     WBC 4 97 Thousand/uL      RBC 3 91 Million/uL      Hemoglobin 11 2 g/dL      Hematocrit 35 1 %      MCV 90 fL      MCH 28 6 pg      MCHC 31 9 g/dL      RDW 12 6 %      MPV 9 7 fL      Platelets 881 Thousands/uL      nRBC 0 /100 WBCs      Neutrophils Relative 51 %      Immat GRANS % 0 %      Lymphocytes Relative 40 %      Monocytes Relative 7 %      Eosinophils Relative 2 %      Basophils Relative 0 %      Neutrophils Absolute 2 51 Thousands/µL      Immature Grans Absolute 0 01 Thousand/uL      Lymphocytes Absolute 2 01 Thousands/µL      Monocytes Absolute 0 34 Thousand/µL      Eosinophils Absolute 0 08 Thousand/µL      Basophils Absolute 0 02 Thousands/µL                  XR chest 1 view portable   ED Interpretation by Christiano Dai MD (05/14 1022)   I interpreted this XR as no acute cardiopulmonary process      VAS lower limb venous duplex study, complete bilateral    (Results Pending)         Procedures  Procedures      ED Course  ED Course as of 05/14/23 1506   Sun May 14, 2023   0911 Procedure Note: EKG  Date/Time: 05/14/23 9:11 AM   Interpreted by: Della Ball   Indications / Diagnosis: CP  ECG reviewed by me, the ED Provider: yes   The EKG demonstrates:  Rhythm: normal sinus with sinus arrhythmia  Intervals: normal intervals  Axis: normal axis  QRS/Blocks: normal QRS  ST Changes: No acute ST Changes, no STD/DANIAL        0943 Hemoglobin(!): 11 2  Patient at 11 7 as of two months ago   1603 Basic metabolic panel(!)  No actionable derangements     1021 hs TnI 0hr: 3  Will delta given her pain was ongoing   1047 I discussed with Vascular Tech, Negative for DVT bilaterally             HEART Risk Score    Flowsheet Row Most Recent Value   Heart Score Risk Calculator    History 0 Filed at: 05/14/2023 1216   ECG 0 Filed at: 05/14/2023 1216   Age 1 Filed at: 05/14/2023 1216   Risk Factors 1 Filed at: 05/14/2023 1216   Troponin 0 Filed at: 05/14/2023 1216   HEART Score 2 Filed at: 05/14/2023 1216                                Medical Decision Making  60-year-old female presenting to ED today with chest pain, bilateral leg pain and episodes of lightheadedness  At this time differential diagnosis includes ACS versus DVT versus pneumonia versus chest wall pain versus musculoskeletal leg pain  Will evaluate at this time with a CBC, CMP, troponin, twelve-lead EKG, chest x-ray  For her leg pains with concern of DVT we will do a bilateral duplex  Lab work and imaging and vascular studies otherwise unremarkable  Will discharge patient home  Encouraged her to follow-up with her primary care provider as well as with her cardiologist to discuss whether or not Holter monitor is appropriate  Strict return to ER precautions given the patient was discharged home  Amount and/or Complexity of Data Reviewed  Labs: ordered  Decision-making details documented in ED Course  Radiology: ordered and independent interpretation performed              Disposition  Final diagnoses:   Pain in both lower extremities   Chest pain   Episodic lightheadedness     Time reflects when diagnosis was documented in both MDM as applicable and the Disposition within this note     Time User Action Codes Description Comment    5/14/2023  9:51 AM Shellie Jurado Add [J76 941,  M79 605] Lower extremity pain, bilateral     5/14/2023 12:18 PM Constance Quiroz Add [F98 150,  M79 605] Pain in both lower extremities     5/14/2023 12:19 PM Constance Quiroz Add [R07 9] Chest pain     5/14/2023 12:19 PM Constance Quiroz Add [R42] Lightheadedness     5/14/2023 12:19 PM Grant How, Vik Remove [R42] Lightheadedness     5/14/2023 12:19 PM Constance Wigginsky Add [R42] Episodic lightheadedness       ED Disposition     ED Disposition   Discharge    Condition   Stable    Date/Time   Sun May 14, 2023 12:16 PM    Comment Reetha L Dubinkin discharge to home/self care  Follow-up Information     Follow up With Specialties Details Why Contact Info Additional [address], [place] Internal Medicine Schedule an appointment as soon as possible for a visit  to discuss follow up Efrem Piedra [phone]       [address] [place] [address] Emergency Department Emergency Medicine Go to  If symptoms worsen, As needed [address] [phone]  [address] [facility] Emergency Department, [address] [place] [address], [address]          Discharge Medication List as of 5/14/2023 12:49 PM      CONTINUE these medications which have NOT CHANGED    Details   amLODIPine (NORVASC) 5 mg tablet Take 1 tablet (5 mg total) by mouth daily, Starting Mon 3/21/2022, Until Mon 10/10/2022, Normal      Cannabidiol 100 MG/ML SOLN Take by mouth daily as needed, Historical Med      fluticasone (FLONASE) 50 mcg/act nasal spray 2 sprays into each nostril daily, Starting Tue 3/24/2020, Normal      losartan-hydrochlorothiazide (HYZAAR) 50-12 5 mg per tablet Take 1 tablet by mouth daily, Starting Mon 2/13/2023, Normal      Omega-3 Fatty Acids (OMEGA 3 500 PO) Take by mouth, Historical Med      pantoprazole (PROTONIX) 40 mg tablet Take 1 tablet (40 mg total) by mouth daily, Starting Thu 1/5/2023, Normal           No discharge procedures on file  PDMP Review     None           ED Provider  Attending physically available and evaluated [facility]  I managed the patient along with the ED Attending      Electronically Signed by         Misty Sy MD  05/14/23 1436

## 2023-05-14 NOTE — DISCHARGE INSTRUCTIONS
You can discuss with your Primary care provider about a referral for holter monitor which can monitor your heart rate continuously to see if that's correlated to your lightheadedness  Return to the ER for any new or worsening symptoms

## 2023-05-14 NOTE — ED ATTENDING ATTESTATION
5/14/2023  I, Aparna Rai MD, saw and evaluated the patient  I have discussed the patient with the resident/non-physician practitioner and agree with the resident's/non-physician practitioner's findings, Plan of Care, and MDM as documented in the resident's/non-physician practitioner's note, except where noted  All available labs and Radiology studies were reviewed  I was present for key portions of any procedure(s) performed by the resident/non-physician practitioner and I was immediately available to provide assistance  At this point I agree with the current assessment done in the Emergency Department  I have conducted an independent evaluation of this patient a history and physical is as follows:    ED Course         Critical Care Time  Procedures    48 yo female concerned for blood clots due to waking up with pain in legs last few nights  Pt also with chest pain similar to previous episodes for which she has been evaluated  Pt feels intermittently lightheadedness worse with ambulation  Pt with cath in august and normal vessels  Vss, afebrile, lungs cta, rrr, abdomen soft nontender, no swelling in legs, no calf tenderness  Duplex to address pt concerns, cardiac workup

## 2023-08-28 ENCOUNTER — TELEPHONE (OUTPATIENT)
Dept: FAMILY MEDICINE CLINIC | Facility: CLINIC | Age: 53
End: 2023-08-28

## 2023-08-28 NOTE — TELEPHONE ENCOUNTER
Patient called, stated that she is having shape leg pain, and also a bruise on her leg, patient stated that her leg is warm to the touch in some places, patient is also experiencing sob, and a chest tightness, did advise patient go to the ED

## 2023-09-01 ENCOUNTER — APPOINTMENT (EMERGENCY)
Dept: NON INVASIVE DIAGNOSTICS | Facility: HOSPITAL | Age: 53
End: 2023-09-01
Payer: COMMERCIAL

## 2023-09-01 ENCOUNTER — HOSPITAL ENCOUNTER (EMERGENCY)
Facility: HOSPITAL | Age: 53
Discharge: HOME | End: 2023-09-01
Attending: EMERGENCY MEDICINE
Payer: COMMERCIAL

## 2023-09-01 VITALS
SYSTOLIC BLOOD PRESSURE: 138 MMHG | HEART RATE: 80 BPM | TEMPERATURE: 97.6 F | RESPIRATION RATE: 18 BRPM | OXYGEN SATURATION: 98 % | DIASTOLIC BLOOD PRESSURE: 73 MMHG

## 2023-09-01 DIAGNOSIS — R79.89 ELEVATED D-DIMER: ICD-10-CM

## 2023-09-01 DIAGNOSIS — M79.604 RIGHT LEG PAIN: Primary | ICD-10-CM

## 2023-09-01 LAB — D DIMER PPP FEU-MCNC: 0.84 UG/ML FEU

## 2023-09-01 PROCEDURE — 36415 COLL VENOUS BLD VENIPUNCTURE: CPT

## 2023-09-01 PROCEDURE — 93005 ELECTROCARDIOGRAM TRACING: CPT

## 2023-09-01 PROCEDURE — 85379 FIBRIN DEGRADATION QUANT: CPT

## 2023-09-01 PROCEDURE — 99285 EMERGENCY DEPT VISIT HI MDM: CPT | Performed by: EMERGENCY MEDICINE

## 2023-09-01 PROCEDURE — 93971 EXTREMITY STUDY: CPT

## 2023-09-01 PROCEDURE — 99284 EMERGENCY DEPT VISIT MOD MDM: CPT

## 2023-09-01 RX ORDER — ACETAMINOPHEN 325 MG/1
975 TABLET ORAL ONCE
Status: DISCONTINUED | OUTPATIENT
Start: 2023-09-01 | End: 2023-09-02 | Stop reason: HOSPADM

## 2023-09-02 LAB
ATRIAL RATE: 69 BPM
P AXIS: 86 DEGREES
PR INTERVAL: 192 MS
QRS AXIS: 47 DEGREES
QRSD INTERVAL: 74 MS
QT INTERVAL: 380 MS
QTC INTERVAL: 407 MS
T WAVE AXIS: 49 DEGREES
VENTRICULAR RATE: 69 BPM

## 2023-09-02 PROCEDURE — 93971 EXTREMITY STUDY: CPT | Performed by: SURGERY

## 2023-09-02 PROCEDURE — 93010 ELECTROCARDIOGRAM REPORT: CPT | Performed by: INTERNAL MEDICINE

## 2023-09-02 NOTE — ED PROVIDER NOTES
Final Diagnoses:     1. Right leg pain    2. Elevated d-dimer      ED Course as of 09/02/23 0004   Fri Sep 01, 2023   2211 ECG 12 lead  Compared to May 14, 2023. Regular rate sinus rhythm, normal axis, MI, QRS, QTc within normal limits. T wave no longer inverted in lead III. Flattening of T waves and V2 and V3 compared to prior. Nonspecific T wave changes. 2253 VAS lower limb venous duplex study, unilateral/limited  TT from [facility], No DVT in the right leg and patent left common femoral   Sat Sep 02, 2023   0003 Spoke to patient regarding need for follow-up ultrasound. Patient given instructions to schedule next Thursday or Friday. Patient in agreement with the plan. Discharged home. Nursing Triage:     Chief Complaint   Patient presents with   • Leg Pain     PT c/o right leg pain that has been on and off for a week. PT states it is a sharp burning sensation that radiates up her leg. No Hx clots in the past. No redness or swelling noted. HPI:   This is a 47 y.o. female presenting for evaluation of right leg pain. Relevant past medical history of venous stasis, hypertension, hyperlipidemia, diagnostic heart cath. Patient states for the last week she has had right leg pain that shoots up her leg. Patient states that she called her PCP and they recommended that she come to the emergency department but has deferred until today when the pain has been too much for her. Patient endorses venous stasis at times and wears compression socks. Patient also states that she has Planter fasciitis that usually affects her right leg and is worried that maybe is coming back in her right leg causing pain to go up her leg. Patient denies any recent trauma, however has been going up and down stairs for house maintenance. Patient denies taking anything for the pain. Patient denies any recent travel, tobacco, alcohol, recreational drugs. Patient denies being on any blood thinners at this time.   Patient denies any chest pain, shortness of breath, muscle weakness, back pain, sensory deficits. ASSESSMENT + PLAN:   Given asymmetric swelling right greater than left concern for DVT, could be worsening venous stasis or muscle strain could also be arthritis. Plan to get D-dimer, DVT study of the right leg as well as trial Tylenol  Physical:   Pertinent: 2+ pitting edema in the right lower leg up to the shin compared to 1+ pitting edema in the left. Circumferential measurement difference of bilateral calfs, no Homans' sign. General: VS reviewed  Appears in NAD  awake, alert. Well-nourished, well-developed. Appears stated age. Speaking normally in full sentences. Head: Normocephalic, atraumatic  Eyes: EOM-I. No diplopia. No hyphema. No subconjunctival hemorrhages. Symmetrical lids. ENT: Atraumatic external nose and ears. MMM  No malocclusion. No stridor. Normal phonation. No drooling. Normal swallowing. Neck: No JVD. CV: Regular rate and rhythm, no murmurs rubs or gallops, no pallor noted  Lungs: Clear bilaterally,  No tachypnea  No respiratory distress  Abd: soft nt nd no rebound/guarding  MSK:   FROM spontaneously  Skin: Dry, intact. Neuro: Awake, alert, GCS15, CN II-XII grossly intact. Motor grossly intact. Vitals:    09/01/23 2037 09/01/23 2038   BP: 138/73    BP Location: Right arm    Pulse: 80    Resp: 18    Temp:  97.6 °F (36.4 °C)   TempSrc:  Tympanic   SpO2: 98%      - There are no obvious limitations to social determinants of care. - Nursing note reviewed. - Vitals reviewed. - Orders placed by myself and/or advanced practitioner / resident.    - Previous chart was reviewed  - No language barrier.   - History obtained from patient. - There are no limitations to the history obtained:     Past Medical:    has a past medical history of Anemia, Concussion, Eczema, Hyperlipidemia, Hypertension, Reactive airway disease, and Sleep apnea.     Past Surgical:    has a past surgical history that includes Tooth extraction; Upper gastrointestinal endoscopy; Cardiac catheterization (N/A, 8/26/2022); and Cardiac catheterization (N/A, 8/26/2022). Social:     Social History     Substance and Sexual Activity   Alcohol Use No    Comment: Per Allscripts: socially     Social History     Tobacco Use   Smoking Status Never   Smokeless Tobacco Never     Social History     Substance and Sexual Activity   Drug Use No       Echo:   No results found for this or any previous visit. No results found for this or any previous visit. Cath:    No results found for this or any previous visit. Code Status: Prior  Advance Directive and Living Will:      Power of :    POLST:    Medications   acetaminophen (TYLENOL) tablet 975 mg (975 mg Oral Not Given 9/1/23 2216)     VAS lower limb venous duplex study, unilateral/limited    (Results Pending)   VAS lower limb venous duplex study, complete bilateral    (Results Pending)     Orders Placed This Encounter   Procedures   • D-dimer, quantitative   • ECG 12 lead   • ECG 12 lead     Labs Reviewed   D-DIMER, QUANTITATIVE - Abnormal       Result Value Ref Range Status    D-Dimer, Quant 0.84 (*) <0.50 ug/ml FEU Final    Comment: Reference and upper limits to exclude DVT and PE are the same. Do not use to exclude if clinical symptoms are present. Pregnant women:  1st trimester:  <0.22 - 1.06 ug/ml FEU  2nd trimester:  <0.22 - 1.88 ug/ml FEU  3rd trimester:   0.24 - 3.28 ug/ml FEU    Note: Normal ranges may not apply to patients who are transgender, non-binary, or whose legal sex, sex at birth, and gender identity differ. Narrative: In the evaluation for possible pulmonary embolism, in the appropriate (Well's Score of 4 or less) patient, the age adjusted d-dimer cutoff for this patient can be calculated as:    Age x 0.01 (in ug/mL) for Age-adjusted D-dimer exclusion threshold for a patient over 50 years.      Time reflects when diagnosis was documented in both MDM as applicable and the Disposition within this note     Time User Action Codes Description Comment    2023 11:28 PM Bripoly Ferrari Add [H18.709] Right leg pain     2023 11:28 PM Bri Ferrari Add [R79.89] Elevated d-dimer       ED Disposition     ED Disposition   Discharge    Condition   Stable    Date/Time   Fri Sep 1, 2023 11:28 PM    Comment   Haroon PICKARD Dubinkin discharge to home/self care. Follow-up Information    None       Discharge Medication List as of 2023 11:32 PM      CONTINUE these medications which have NOT CHANGED    Details   amLODIPine (NORVASC) 5 mg tablet Take 1 tablet (5 mg total) by mouth daily, Starting Mon 3/21/2022, Until Mon 10/10/2022, Normal      Cannabidiol 100 MG/ML SOLN Take by mouth daily as needed, Historical Med      fluticasone (FLONASE) 50 mcg/act nasal spray 2 sprays into each nostril daily, Starting Tue 3/24/2020, Normal      losartan-hydrochlorothiazide (HYZAAR) 50-12.5 mg per tablet Take 1 tablet by mouth daily, Starting 2023, Normal      Omega-3 Fatty Acids (OMEGA 3 500 PO) Take by mouth, Historical Med      pantoprazole (PROTONIX) 40 mg tablet Take 1 tablet (40 mg total) by mouth daily, Starting Thu 2023, Normal           Outpatient Discharge Orders   VAS lower limb venous duplex study, complete bilateral   Standing Status: Future Standing Exp. Date: 27     Prior to Admission Medications   Prescriptions Last Dose Informant Patient Reported? Taking?    Cannabidiol 100 MG/ML SOLN  Self Yes No   Sig: Take by mouth daily as needed   Omega-3 Fatty Acids (OMEGA 3 500 PO)  Self Yes No   Sig: Take by mouth   amLODIPine (NORVASC) 5 mg tablet  Self No No   Sig: Take 1 tablet (5 mg total) by mouth daily   fluticasone (FLONASE) 50 mcg/act nasal spray  Self No No   Si sprays into each nostril daily   losartan-hydrochlorothiazide (HYZAAR) 50-12.5 mg per tablet   No No   Sig: Take 1 tablet by mouth daily   pantoprazole (PROTONIX) 40 mg tablet No No   Sig: Take 1 tablet (40 mg total) by mouth daily      Facility-Administered Medications: None                        Portions of the record may have been created with voice recognition software. Occasional wrong word or "sound a like" substitutions may have occurred due to the inherent limitations of voice recognition software. Read the chart carefully and recognize, using context, where substitutions have occurred.        Yolanda Tong MD  09/02/23 5637

## 2023-09-02 NOTE — ED ATTENDING ATTESTATION
9/1/2023  I, Adrienne Simms MD, saw and evaluated the patient. I have discussed the patient with the resident/non-physician practitioner and agree with the resident's/non-physician practitioner's findings, Plan of Care, and MDM as documented in the resident's/non-physician practitioner's note, except where noted. All available labs and Radiology studies were reviewed. I was present for key portions of any procedure(s) performed by the resident/non-physician practitioner and I was immediately available to provide assistance. At this point I agree with the current assessment done in the Emergency Department. I have conducted an independent evaluation of this patient a history and physical is as follows: This is a 72-year-old woman who presents to the emergency department with sharp lower extremity pain, right greater than left. Patient states that she has had intermittent stabbing pains in her legs that have been on and off for some time, but now has developed some swelling in her legs as well and complains of sharp pains in the right. Patient states that she wears compression stockings, but has been less compliant over the summer because its been too hot for them. She also states that she is generally very careful about her diet, but has been less so over the summer. The patient has not had significant shortness of breath. She states that she has GERD and that has been acting up lately. She describes this as a burning in her chest that radiates down into her abdomen that occurs at night and is made worse by dietary discretions. She states that her GERD is significant enough that there are days at all she eats is salad and tea. Patient does not have a history of cardiac disease. She does not have exertional symptoms. She does not have pleuritic symptoms. She does not have any chest pain currently. She denies abdominal pain.   Her review of systems is otherwise negative and 12 systems reviewed. On exam vital signs were reviewed. Patient is awake, alert, interactive. The patient's pupils are equally round reactive to light. Oropharynx is clear with moist mucous membranes. Neck is supple and nontender with no adenopathy or JVD. Heart is regular with no murmurs, rubs, or gallops. Lungs are clear and equal with no wheezes, rales, or rhonchi. Abdomen is soft and nontender with no masses, rebound, or guarding. There is no CVA tenderness. The patient was completely exposed. There is no skin breakdown. There are no rashes or skin changes. Extremities are warm and well perfused with good pulses. The patient has normal strength, sensation, and cranial nerves. Impression: Right lower leg pain, benign exam with good pulses, no rashes or skin changes, duplex was negative with positive dimer. We will plan to have follow-up duplex in 1 week.   EKG reviewed by me, sinus rhythm without ischemia or ectopy  ED Course         Critical Care Time  Procedures

## 2023-09-02 NOTE — DISCHARGE INSTRUCTIONS
Today you were seen in the emergency department today for right leg pain. Your ultrasound and EKG were normal.  A marker of possible blood clot formation was mildly elevated for that reason we want you to have a follow-up ultrasound in 1 week. All the paperwork you are being provided there is a phone number for the ultrasound scheduling. Please make sure that you follow-up next Thursday or Friday.     Request an Appointment     [phone])  Hours:  Monday to Friday: 7:30 am to 5:30 pm  Saturday: 8 am to 12 pm

## 2023-09-05 ENCOUNTER — TELEPHONE (OUTPATIENT)
Dept: FAMILY MEDICINE CLINIC | Facility: CLINIC | Age: 53
End: 2023-09-05

## 2023-09-05 DIAGNOSIS — R60.0 BILATERAL LEG EDEMA: Primary | ICD-10-CM

## 2023-09-05 NOTE — TELEPHONE ENCOUNTER
Good morning. I was in the emergency on 9/01 for ultrasound on my legs just to determine if I had blood clots. The doctor the in the emergency wanted me to have another one within a week, another ultrasound because of my blood work coming back. When I called today to order it, they said that my primary needs to order it because it was put in the wrong way. So if someone could call me back about that I also I needed to be sent in on my only day off. This week is Thursday to have it done at [facility]. My phone number is [phone]. Thank you.

## 2023-09-06 ENCOUNTER — HOSPITAL ENCOUNTER (OUTPATIENT)
Dept: NON INVASIVE DIAGNOSTICS | Facility: HOSPITAL | Age: 53
Discharge: HOME | End: 2023-09-06
Payer: COMMERCIAL

## 2023-09-06 DIAGNOSIS — R60.0 BILATERAL LEG EDEMA: ICD-10-CM

## 2023-09-06 PROCEDURE — 93970 EXTREMITY STUDY: CPT | Performed by: SURGERY

## 2023-09-06 PROCEDURE — 93970 EXTREMITY STUDY: CPT

## 2023-10-11 ENCOUNTER — OFFICE VISIT (OUTPATIENT)
Dept: FAMILY MEDICINE CLINIC | Facility: CLINIC | Age: 53
End: 2023-10-11
Payer: COMMERCIAL

## 2023-10-11 VITALS
OXYGEN SATURATION: 99 % | HEART RATE: 85 BPM | RESPIRATION RATE: 18 BRPM | DIASTOLIC BLOOD PRESSURE: 72 MMHG | WEIGHT: 289.8 LBS | TEMPERATURE: 98.8 F | HEIGHT: 64 IN | SYSTOLIC BLOOD PRESSURE: 110 MMHG | BODY MASS INDEX: 49.47 KG/M2

## 2023-10-11 DIAGNOSIS — R39.15 URINARY URGENCY: ICD-10-CM

## 2023-10-11 DIAGNOSIS — R10.32 ACUTE LEFT LOWER QUADRANT PAIN: Primary | ICD-10-CM

## 2023-10-11 LAB
BACTERIA UR QL AUTO: ABNORMAL /HPF
BILIRUB UR QL STRIP: NEGATIVE
CLARITY UR: CLEAR
COLOR UR: ABNORMAL
GLUCOSE UR STRIP-MCNC: NEGATIVE MG/DL
HGB UR QL STRIP.AUTO: NEGATIVE
KETONES UR STRIP-MCNC: NEGATIVE MG/DL
LEUKOCYTE ESTERASE UR QL STRIP: NEGATIVE
NITRITE UR QL STRIP: NEGATIVE
NON-SQ EPI CELLS URNS QL MICRO: ABNORMAL /HPF
PH UR STRIP.AUTO: 8 [PH]
PROT UR STRIP-MCNC: ABNORMAL MG/DL
RBC #/AREA URNS AUTO: ABNORMAL /HPF
SL AMB  POCT GLUCOSE, UA: ABNORMAL
SL AMB LEUKOCYTE ESTERASE,UA: ABNORMAL
SL AMB POCT BILIRUBIN,UA: ABNORMAL
SL AMB POCT BLOOD,UA: ABNORMAL
SL AMB POCT CLARITY,UA: ABNORMAL
SL AMB POCT COLOR,UA: YELLOW
SL AMB POCT KETONES,UA: ABNORMAL
SL AMB POCT NITRITE,UA: ABNORMAL
SL AMB POCT PH,UA: 8
SL AMB POCT SPECIFIC GRAVITY,UA: 1.01
SL AMB POCT URINE PROTEIN: ABNORMAL
SL AMB POCT UROBILINOGEN: ABNORMAL
SP GR UR STRIP.AUTO: 1.01 (ref 1–1.03)
UROBILINOGEN UR STRIP-ACNC: <2 MG/DL
WBC #/AREA URNS AUTO: ABNORMAL /HPF

## 2023-10-11 PROCEDURE — 87086 URINE CULTURE/COLONY COUNT: CPT | Performed by: FAMILY MEDICINE

## 2023-10-11 PROCEDURE — 81002 URINALYSIS NONAUTO W/O SCOPE: CPT | Performed by: FAMILY MEDICINE

## 2023-10-11 PROCEDURE — 99213 OFFICE O/P EST LOW 20 MIN: CPT | Performed by: FAMILY MEDICINE

## 2023-10-11 PROCEDURE — 81001 URINALYSIS AUTO W/SCOPE: CPT | Performed by: FAMILY MEDICINE

## 2023-10-11 RX ORDER — CIPROFLOXACIN 500 MG/1
TABLET, FILM COATED ORAL
Qty: 14 TABLET | Refills: 0 | Status: SHIPPED | OUTPATIENT
Start: 2023-10-11 | End: 2023-10-18

## 2023-10-11 NOTE — PROGRESS NOTES
Assessment/Plan:    L LQ pain / Urinary urgency, frequency - urine dipstick is positive for leukocytes 3+and protein. Will send urine for urinalysis and culture. Suspect UTI. Also in differential acute diverticulitis. Recommended patient to follow a clear liquid diet today and tomorrow. Stay well hydrated. Will start on Cipro 500 mg 1 tablet twice daily for 7 days. Recommended to go to the emergency room if  symptoms persist or worsen. Subjective:      Patient ID: Dorothy Ramos is a 47 y.o. female. HPI    Patient presents to the office c/o L LQ pain, urinary urgency, frequency since yesterday morning. Denies fever, had chills yesterday. No blood in urine. Denies burning with urination. No diarrhea. No nausea, vomiting. Denies H/o diverticulitis or recurrent UTI's. Colonoscopy done in 7/2022 showed scattered diverticulosis. Review of Systems   Constitutional:  Positive for chills. Negative for activity change, appetite change, fatigue and fever. HENT: Negative. Respiratory:  Negative for cough. Cardiovascular:  Negative for chest pain. Gastrointestinal:  Positive for abdominal pain (L LQ pain) and constipation (chronic). Negative for blood in stool, diarrhea, nausea and vomiting. Genitourinary:  Positive for frequency and urgency. Negative for difficulty urinating, dysuria and hematuria. Neurological:  Negative for dizziness and headaches. Hematological: Negative. Objective:      /72   Pulse 85   Temp 98.8 °F (37.1 °C) (Tympanic)   Resp 18   Ht 5' 4" (1.626 m)   Wt 131 kg (289 lb 12.8 oz)   LMP 08/01/2021 (Approximate)   SpO2 99%   BMI 49.74 kg/m²          Physical Exam  Vitals and nursing note reviewed. Constitutional:       Appearance: She is well-developed. Comments: Morbidly obese   Cardiovascular:      Rate and Rhythm: Normal rate and regular rhythm.    Pulmonary:      Effort: Pulmonary effort is normal.      Breath sounds: Normal breath sounds. Abdominal:      General: Bowel sounds are normal. There is no distension. Palpations: Abdomen is soft. Tenderness: There is abdominal tenderness in the left lower quadrant. There is no right CVA tenderness, left CVA tenderness, guarding or rebound. Skin:     General: Skin is warm and dry. Findings: No rash. Neurological:      Mental Status: She is alert.

## 2023-10-12 LAB — BACTERIA UR CULT: NORMAL

## 2023-10-13 ENCOUNTER — TELEPHONE (OUTPATIENT)
Dept: FAMILY MEDICINE CLINIC | Facility: CLINIC | Age: 53
End: 2023-10-13

## 2023-10-13 NOTE — TELEPHONE ENCOUNTER
Per patient states that pain is better. She would like to know if she should stop the antibiotics our finish the course.

## 2023-10-13 NOTE — TELEPHONE ENCOUNTER
Voicemail:  Hi, my name is Eder linares. My YOB: 1970. I received a phone call from Doctor [facility]. Unfortunately I could not understand the message. I think there is a bad connection. Anyway, she had called it. I did hear something about my new test result. So if someone could give me a call back or she could give me a call back, it would greatly appreciate it. I think I may. I'm supposed to be going to the emergency, possibly for an ultrasound. I just wanted to confirm that if that was what the test results said. Thank you. md Fox. I'm sorry. And my phone number is [phone]. Returned call to patient and couldn't find any results to give to patient-informed patient we will forward to physician.

## 2023-10-20 ENCOUNTER — OFFICE VISIT (OUTPATIENT)
Dept: FAMILY MEDICINE CLINIC | Facility: CLINIC | Age: 53
End: 2023-10-20

## 2023-10-20 VITALS
BODY MASS INDEX: 50.02 KG/M2 | HEART RATE: 66 BPM | RESPIRATION RATE: 16 BRPM | WEIGHT: 293 LBS | TEMPERATURE: 97.7 F | DIASTOLIC BLOOD PRESSURE: 68 MMHG | OXYGEN SATURATION: 99 % | SYSTOLIC BLOOD PRESSURE: 112 MMHG | HEIGHT: 64 IN

## 2023-10-20 DIAGNOSIS — R10.32 ACUTE LEFT LOWER QUADRANT PAIN: ICD-10-CM

## 2023-10-20 DIAGNOSIS — R73.03 PRE-DIABETES: ICD-10-CM

## 2023-10-20 DIAGNOSIS — E78.5 HYPERLIPIDEMIA, UNSPECIFIED HYPERLIPIDEMIA TYPE: ICD-10-CM

## 2023-10-20 DIAGNOSIS — R39.15 URINARY URGENCY: ICD-10-CM

## 2023-10-20 DIAGNOSIS — I10 ESSENTIAL HYPERTENSION: Primary | ICD-10-CM

## 2023-10-20 DIAGNOSIS — Z12.31 ENCOUNTER FOR SCREENING MAMMOGRAM FOR MALIGNANT NEOPLASM OF BREAST: ICD-10-CM

## 2023-10-20 DIAGNOSIS — E66.01 CLASS 3 SEVERE OBESITY DUE TO EXCESS CALORIES WITHOUT SERIOUS COMORBIDITY WITH BODY MASS INDEX (BMI) OF 50.0 TO 59.9 IN ADULT (HCC): ICD-10-CM

## 2023-10-20 DIAGNOSIS — Z12.4 CERVICAL CANCER SCREENING: ICD-10-CM

## 2023-10-20 PROBLEM — R06.09 DYSPNEA ON EXERTION: Status: RESOLVED | Noted: 2017-02-20 | Resolved: 2023-10-20

## 2023-10-20 NOTE — ASSESSMENT & PLAN NOTE
Lab Results   Component Value Date    HGBA1C 6.1 (H) 08/25/2022     Last A1c 6.1. A1c ordered for surveillance.

## 2023-10-20 NOTE — ASSESSMENT & PLAN NOTE
Patient was evaluated last week for acute left lower quadrant pain. Antibiotics were provided to the patient. Her urine was negative. Her symptoms have resolved. She is still having some urinary urgency but that has been going on for several months.

## 2023-10-20 NOTE — PROGRESS NOTES
[facility] - [facility]    NAME: Reetha L Dubinkin  AGE: 47 y.o. SEX: female  : 1970     DATE: 10/20/2023     Assessment and Plan:     Problem List Items Addressed This Visit        Cardiovascular and Mediastinum    Essential hypertension - Primary (Chronic)     Blood pressure in the office today is 112/68. She continues on her Hyzaar. Patient was scanned today         Relevant Orders    Comprehensive metabolic panel    CBC and differential       Other    Hyperlipidemia (Chronic)     Patient due for lipid panel. Her levels have been increased in the past.  She was placed on Zetia and Lipitor however the patient did stop these medications due to cramping. Over-the-counter red yeast rice and co-Q10 discussed. Lipid panel ordered for surveillance. Relevant Orders    Lipid Panel with Direct LDL reflex    Class 3 severe obesity due to excess calories without serious comorbidity with body mass index (BMI) of 50.0 to 59.9 in adult [facility]) (Chronic)     Discussed lifestyle changes including diet and exercise. Diet should include switching from simple carbohydrates like white rice, white pasta, white bread to brown rice, wheat pasta, wheat bread. Increase exercise to 150 minutes per week, should include cardio and weightlifting. Patient states today I might write the needed medications will make         Pre-diabetes     Lab Results   Component Value Date    HGBA1C 6.1 (H) 2022     Last A1c 6.1. A1c ordered for surveillance. Relevant Orders    HEMOGLOBIN A1C W/ EAG ESTIMATION    Acute left lower quadrant pain     Patient was evaluated last week for acute left lower quadrant pain. Antibiotics were provided to the patient. Her urine was negative. Her symptoms have resolved. She is still having some urinary urgency but that has been going on for several months. Urinary urgency     Patient continues with urinary urgency for several months. Occasional stress incontinence. Information regarding bladder irritants was provided to the patient. Other Visit Diagnoses     Cervical cancer screening        Relevant Orders    Ambulatory Referral to Gynecology    Encounter for screening mammogram for malignant neoplasm of breast        Relevant Orders    Mammo screening bilateral w 3d & cad          BMI Counseling: Body mass index is 50.43 kg/m². The BMI is above normal. Nutrition recommendations include decreasing portion sizes, encouraging healthy choices of fruits and vegetables, consuming healthier snacks, limiting drinks that contain sugar, moderation in carbohydrate intake, increasing intake of lean protein, reducing intake of saturated and trans fat and reducing intake of cholesterol. Exercise recommendations include moderate physical activity 150 minutes/week and exercising 3-5 times per week. Rationale for BMI follow-up plan is due to patient being overweight or obese. Return in about 4 months (around 2/20/2024) for yearly physical exam, Arelis Spicer. Chief Complaint:     Chief Complaint   Patient presents with   • Follow-up     1 week abdominal pain         History of Present Illness: The patient presents to the office today for follow-up of her abdominal pain 2 weeks ago. She is feeling better. She does have residual urgency. I did recommend she cut back on caffeine and increase her water intake. Timed voiding may also help. Mammogram and cervical cancer screening discussed with the patient. She did have a colonoscopy last year. Blood work ordered for the patient to have done in January. I will see her back in 4 months. Review of Systems:     Review of Systems   Constitutional:  Negative for activity change, fatigue and fever. HENT:  Negative for congestion, hearing loss, rhinorrhea, trouble swallowing and voice change. Eyes:  Negative for photophobia, pain, discharge and visual disturbance.    Respiratory: Negative for cough, chest tightness and shortness of breath. Cardiovascular:  Negative for chest pain, palpitations and leg swelling. Gastrointestinal:  Negative for abdominal pain, blood in stool, constipation, nausea and vomiting. Endocrine: Negative for cold intolerance and heat intolerance. Genitourinary:  Positive for urgency. Negative for difficulty urinating, frequency, hematuria, vaginal bleeding and vaginal discharge. Musculoskeletal:  Negative for arthralgias and myalgias. Skin: Negative. Neurological:  Negative for dizziness, weakness, numbness and headaches. Psychiatric/Behavioral:  Negative for decreased concentration. The patient is not nervous/anxious. Problem List:     Patient Active Problem List   Diagnosis   • Anxiety   • Bilateral leg edema   • Essential hypertension   • Hyperlipidemia   • Class 3 severe obesity due to excess calories without serious comorbidity with body mass index (BMI) of 50.0 to 59.9 in adult [facility])   • Osteoarthritis of knee   • Seasonal allergies   • Venous stasis dermatitis   • Sacroiliitis (HCC)   • History of Helicobacter pylori infection   • Vegetarian   • Gastroesophageal reflux disease without esophagitis   • Pre-diabetes   • MARKUS (obstructive sleep apnea)   • Acute left lower quadrant pain   • Urinary urgency        Objective:     /68   Pulse 66   Temp 97.7 °F (36.5 °C) (Temporal)   Resp 16   Ht 5' 4" (1.626 m)   Wt 133 kg (293 lb 12.8 oz)   LMP 08/01/2021 (Approximate)   SpO2 99%   BMI 50.43 kg/m²     Current Outpatient Medications   Medication Sig Dispense Refill   • Cannabidiol 100 MG/ML SOLN Take by mouth daily as needed     • fluticasone (FLONASE) 50 mcg/act nasal spray 2 sprays into each nostril daily 16 g 3   • losartan-hydrochlorothiazide (HYZAAR) 50-12.5 mg per tablet Take 1 tablet by mouth daily 90 tablet 3     No current facility-administered medications for this visit. Physical Exam  Vitals reviewed. Constitutional:       Appearance: Normal appearance. She is obese. HENT:      Head: Normocephalic. Nose: Nose normal.      Mouth/Throat:      Mouth: Mucous membranes are moist.      Pharynx: Oropharynx is clear. Eyes:      Extraocular Movements: Extraocular movements intact. Pupils: Pupils are equal, round, and reactive to light. Cardiovascular:      Rate and Rhythm: Normal rate and regular rhythm. Pulses: Normal pulses. Heart sounds: Normal heart sounds. Pulmonary:      Effort: Pulmonary effort is normal.      Breath sounds: Normal breath sounds. Abdominal:      General: Bowel sounds are normal.      Palpations: Abdomen is soft. Musculoskeletal:         General: Normal range of motion. Skin:     General: Skin is warm and dry. Neurological:      General: No focal deficit present. Mental Status: She is alert and oriented to person, place, and time. Mental status is at baseline. Psychiatric:         Mood and Affect: Mood normal.         Behavior: Behavior normal.         Thought Content:  Thought content normal.         Judgment: Judgment normal.         Mayra Osuna, [address]

## 2023-10-20 NOTE — ASSESSMENT & PLAN NOTE
Patient continues with urinary urgency for several months. Occasional stress incontinence. Information regarding bladder irritants was provided to the patient.

## 2023-10-20 NOTE — PATIENT INSTRUCTIONS
FOODS THAT MAY BE BLADDER IRRITANTS    The following selections show the most common foods which may worsen or irritate the bladder in patients with overactive bladder or interstitial cystitis. This does not mean that you need to avoid all of these foods, but you should pay close attention to how you feel after eating them. If you feel worse, then there is a good chance that this is a trigger food for you. Cranberry Juices and Extracts:   Cranberry juice may be the most frequent irritant in a patient’s diet. Recommended for consumption during urinary tract infections, cranberry juice can be very difficult for an overactive bladder to tolerate. If you don’t wish to give it up entirely, try diluting it with water by half or more. Coffee and Tea Products: In a sensitive bladder, coffee is believed to be the top bladder irritant. Some people can tolerate low acid coffees, while others try teas. For the most sensitive patient, the best option for a hot drink is hot water with honey. Carbonated beverages an sodas of any type (diet and regular): The most difficult soda to tolerate appears to be diet cola, which may consist of carbonation, caffeine, aspartame, and cocoa derivatives, four known bladder irritants. If you must have soda, try a clear, non-diet soda like Sprite. Tomatoes, Tofu, and some Beans:   Red tomatoes can be very acidic. Some patients can tolerate pizza and tomato sauces for pasta, while others cannot. Low acid yellow tomatoes may be good substitutes in pasta and salads. Hugo, black, lima and soy beans are also potential irritants, due to their high acid content. Herbal teas:   Patients with overactive bladder can be sensitive to herb teas, particularly those that have many ingredients. If you cannot avoid herbal tea, try experimenting with one or two ingredients at a time, so you can tell if a particular herb bothers you.     Tobacco.    Alcohol and Vinegars:   Wine, beer, champagne and even wine sauces could be irritants due to their alcohol content. Some patients find white yuli more tolerable than red yuli (due to their reduced histamine content). Chocolate  Chocolate is considered one of the worst irritants for the body. Strawberries and Acidic Fruits  Lemon, orange, grapefruit, pineapple, strawberries and plums have been known to inflame sensitive bladders. Fruit juices seem to be particularly difficult for some patients to tolerate because each can of juice may contain more acid than contained in one piece of fruit. Try low acid fruit juice, or diluted juice. Food Additives and Seasonings  Food additives are known to cause chemical sensitivities and allergic reactions in a small percentage of the general public, particularly monosodium glutamate (MSG), nitrates, and butylated hydroxytolene (BHT). Monosodium Glutamate (MSG) is frequently found in prepared foods and mixes. Nitrates, found in prepared meats, have long been suspected as bladder irritants. Butylated hydroxytolene (BHT), is commonly found in cereals and breads. A careful review of ingredient labels will help identify foods that should be limited or eliminated from your diet. BLADDER HEALTH    WHAT IS CONSIDERED NORMAL? The average bladder can hold about 2 cups of urine before it needs to be emptied. The normal range of voiding urine is 6 to 8 times during a 24 hour period. As we get older, our bladder capacity can get smaller and we may need to pass urine more frequently but usually not more than every 2 hours. Urine should flow easily without discomfort in a good, steady stream until the bladder is empty. No pushing or straining is necessary to empty the bladder. An urge is a signal that you feel as the bladder stretches to fill with urine. Urges can be felt even if the bladder is not full.  Urges are not commands to go to the toilet, merely a signal and can be controlled. WHAT ARE GOOD BLADDER HABITS? Take your time when emptying your bladder. Don’t strain or push to empty your bladder. Make sure you empty your bladder completely each time you pass urine. Do not rush the process. Consistently ignoring the urge to go (waiting more than 4 hours between toileting) or urinating too infrequently may be convenient but not healthy for your bladder. Avoid going to the toilet “just in case” or more often than every 2 hours. It is usually not necessary to go when you feel the first urge. Try to go only when your bladder is full. Urgency and frequency of urination can be improved by retraining the bladder and spacing your fluid intake throughout the day. Practice good toilet habits. Don’t let your bladder control your life. TIPS TO MAINTAIN GOOD BLADDER HABITS  Maintain a good fluid intake. Depending on your body size and environment, drink 6 -8 cups (8 oz each) of fluid per day unless otherwise advised by your doctor. Not enough fluid creates a foul odor and dark color of the urine. Limit the amount of caffeine (coffee, cola, chocolate or tea) and citrus foods that you consume as these foods can be associated with increased sensation of urinary urgency and frequency. Limit the amount of alcohol you drink. Alcohol increases urine production and makes it difficult for the brain to coordinate bladder control. Avoid constipation by maintaining a balanced diet of dietary fiber. Cigarette smoking is also irritating to the bladder surface and is associated with bladder cancer. In addition, the coughing associated with smoking may lead to increased incontinent episodes because of the increased pressure. HOW DIET CAN AFFECT YOUR BLADDER  Although there is no particular "diet" that can cure bladder control, there are certain dietary suggestions you can use to help control the problem.     There are 2 points to consider when evaluating how your habits and diet may affect your bladder:    Foods and fluids can irritate the bladder  Some foods and beverages are thought to contribute to bladder leakage and irritability. However their effect on the bladder is not completely understood and you may want to see if eliminating one or all of these items improves your bladder control. If you are unable to give them up completely, it is recommended that you use the following items in moderation:  Acidic beverages and foods (orange juice, grapefruit juice, lemonade etc)  Alcoholic beverages  Vinegar  Coffee (regular and decaf)  Tea (regular and decaf)  Caffeinated beverages  Carbonated beverages          Drinking enough and the right kinds of fluids  Many people with bladder control issues decrease their intake of liquids in hope that they will need to urinate less frequently or have less urinary leakage. You should not restrict fluids to control your bladder. While a decrease in liquid intake does result in a decrease in the volume of urine, the smaller amount of urine may be more highly concentrated. Highly concentrated, dark yellow urine is irritating to the bladder surface and may actually cause you to go to the bathroom more frequently. It also encourages the growth of bacteria, which may lead to infections resulting in incontinence. Substitutions for Bladder Irritants: water is always the best beverage choice. Grape and apple juice are thirst quenchers are good selections and are not as irritating to the bladder. Low acid fruits:  Pears, apricots, papaya, watermelon  For coffee drinkers: KAVA®, Postum®, Theresa®, Kaffree Gregoria®  For tea drinkers:  non-citrus or herbal and sun brewed tea       FOODS THAT MAY BE BLADDER IRRITANTS    The following selections show the most common foods which may worsen or irritate the bladder in patients with overactive bladder or interstitial cystitis.  This does not mean that you need to avoid all of these foods, but you should pay close attention to how you feel after eating them. If you feel worse, then there is a good chance that this is a trigger food for you. Cranberry Juices and Extracts:   Cranberry juice may be the most frequent irritant in a patient’s diet. Recommended for consumption during urinary tract infections, cranberry juice can be very difficult for an overactive bladder to tolerate. If you don’t wish to give it up entirely, try diluting it with water by half or more. Coffee and Tea Products: In a sensitive bladder, coffee is believed to be the top bladder irritant. Some people can tolerate low acid coffees, while others try teas. For the most sensitive patient, the best option for a hot drink is hot water with honey. Carbonated beverages an sodas of any type (diet and regular): The most difficult soda to tolerate appears to be diet cola, which may consist of carbonation, caffeine, aspartame, and cocoa derivatives, four known bladder irritants. If you must have soda, try a clear, non-diet soda like Sprite. Tomatoes, Tofu, and some Beans:   Red tomatoes can be very acidic. Some patients can tolerate pizza and tomato sauces for pasta, while others cannot. Low acid yellow tomatoes may be good substitutes in pasta and salads. Hugo, black, lima and soy beans are also potential irritants, due to their high acid content. Herbal teas:   Patients with overactive bladder can be sensitive to herb teas, particularly those that have many ingredients. If you cannot avoid herbal tea, try experimenting with one or two ingredients at a time, so you can tell if a particular herb bothers you. Tobacco.    Alcohol and Vinegars:   Wine, beer, champagne and even wine sauces could be irritants due to their alcohol content. Some patients find white yuli more tolerable than red yuli (due to their reduced histamine content).     Chocolate  Chocolate is considered one of the worst irritants for the body.    Strawberries and Acidic Fruits  Lemon, orange, grapefruit, pineapple, strawberries and plums have been known to inflame sensitive bladders. Fruit juices seem to be particularly difficult for some patients to tolerate because each can of juice may contain more acid than contained in one piece of fruit. Try low acid fruit juice, or diluted juice. Food Additives and Seasonings  Food additives are known to cause chemical sensitivities and allergic reactions in a small percentage of the general public, particularly monosodium glutamate (MSG), nitrates, and butylated hydroxytolene (BHT). Monosodium Glutamate (MSG) is frequently found in prepared foods and mixes. Nitrates, found in prepared meats, have long been suspected as bladder irritants. Butylated hydroxytolene (BHT), is commonly found in cereals and breads. A careful review of ingredient labels will help identify foods that should be limited or eliminated from your diet.

## 2023-10-20 NOTE — ASSESSMENT & PLAN NOTE
Discussed lifestyle changes including diet and exercise. Diet should include switching from simple carbohydrates like white rice, white pasta, white bread to brown rice, wheat pasta, wheat bread. Increase exercise to 150 minutes per week, should include cardio and weightlifting.   Patient states today I might write the needed medications will make

## 2023-10-20 NOTE — ASSESSMENT & PLAN NOTE
Patient due for lipid panel. Her levels have been increased in the past.  She was placed on Zetia and Lipitor however the patient did stop these medications due to cramping. Over-the-counter red yeast rice and co-Q10 discussed. Lipid panel ordered for surveillance.

## 2023-10-20 NOTE — ASSESSMENT & PLAN NOTE
Blood pressure in the office today is 112/68. She continues on her Hyzaar.   Patient was scanned today

## 2023-11-09 ENCOUNTER — ANNUAL EXAM (OUTPATIENT)
Dept: OBGYN CLINIC | Facility: CLINIC | Age: 53
End: 2023-11-09
Payer: COMMERCIAL

## 2023-11-09 VITALS
SYSTOLIC BLOOD PRESSURE: 122 MMHG | DIASTOLIC BLOOD PRESSURE: 72 MMHG | BODY MASS INDEX: 49.24 KG/M2 | WEIGHT: 288.4 LBS | HEIGHT: 64 IN

## 2023-11-09 DIAGNOSIS — R35.0 URINE FREQUENCY: ICD-10-CM

## 2023-11-09 DIAGNOSIS — Z01.419 WOMEN'S ANNUAL ROUTINE GYNECOLOGICAL EXAMINATION: Primary | ICD-10-CM

## 2023-11-09 DIAGNOSIS — Z12.31 ENCOUNTER FOR SCREENING MAMMOGRAM FOR MALIGNANT NEOPLASM OF BREAST: ICD-10-CM

## 2023-11-09 DIAGNOSIS — N95.2 ATROPHIC VAGINITIS: ICD-10-CM

## 2023-11-09 DIAGNOSIS — Z12.4 CERVICAL CANCER SCREENING: ICD-10-CM

## 2023-11-09 LAB
SL AMB  POCT GLUCOSE, UA: ABNORMAL
SL AMB LEUKOCYTE ESTERASE,UA: ABNORMAL
SL AMB POCT BILIRUBIN,UA: ABNORMAL
SL AMB POCT BLOOD,UA: ABNORMAL
SL AMB POCT CLARITY,UA: CLEAR
SL AMB POCT COLOR,UA: YELLOW
SL AMB POCT KETONES,UA: ABNORMAL
SL AMB POCT NITRITE,UA: ABNORMAL
SL AMB POCT PH,UA: 5
SL AMB POCT SPECIFIC GRAVITY,UA: 1.02
SL AMB POCT URINE PROTEIN: 15
SL AMB POCT UROBILINOGEN: 0.2

## 2023-11-09 PROCEDURE — 81002 URINALYSIS NONAUTO W/O SCOPE: CPT | Performed by: OBSTETRICS & GYNECOLOGY

## 2023-11-09 PROCEDURE — 99386 PREV VISIT NEW AGE 40-64: CPT | Performed by: OBSTETRICS & GYNECOLOGY

## 2023-11-09 NOTE — PROGRESS NOTES
Assessment/Plan:    No problem-specific Assessment & Plan notes found for this encounter. Diagnoses and all orders for this visit:    Women's annual routine gynecological examination  -     Thinprep Tis Pap Reflex HPV mRNA E6/E7    Encounter for screening mammogram for malignant neoplasm of breast  -     Mammo screening bilateral w 3d & cad; Future    Cervical cancer screening  -     Ambulatory Referral to Gynecology    Atrophic vaginitis    Other orders  -     MAGNESIUM PO; Take by mouth          Normal gynecological physical examination. Self-breast examination stressed. Mammogram ordered. Discussed regular exercise, healthy diet, importance of vitamin D and calcium supplements. Discussed importance of sun block use during periods of prolonged sun exposure. Patient will be seen in 1 year for routine gynecologic and medical examination. Patient will call office for any problems, concerns, or issues which may arise during the interim. Subjective:      Patient reports intermittent episodes of lower abdominal discomfort. Will order baseline pelvic ultrasound for evaluation. HPI    Patient ID: Marilee Zelaya is a 47 y.o. female who presents today for her annual gynecologic and medical examination    Menstrual status: Patient is postmenopausal and denies any vaginal bleeding over the last 2 years. Vasomotor symptoms: Reports intermittent episodes of hot flashes. Recommended [place] nutrition for supplements suggestions if necessary.     Patient reports normal appetite    Patient reports normal bowel and bladder habits    Patient denies any significant pelvic or abdominal pain    Patient denies any headaches, chest pain, shortness of breath fever shakes or chills    Patient denies any COVID 19 symptoms including cough or loss of taste or smell    COVID vaccine status: Patient up-to-date with COVID-vaccine    Medical problems: Patient followed for blood pressure    Colonoscopy status: Patient up-to-date with screening colonoscopy    Mammogram status: Importance of self breast examination was stressed to the patient and she is up-to-date with screening mammography. Appropriate arrangements for her annual screening mammogram were placed in the EMR system for today's visit. The following portions of the patient's history were reviewed and updated as appropriate: allergies, current medications, past family history, past medical history, past social history, past surgical history and problem list.    Review of Systems   Constitutional: Negative. Negative for appetite change, diaphoresis, fatigue, fever and unexpected weight change. HENT: Negative. Eyes: Negative. Respiratory: Negative. Cardiovascular: Negative. Follow-up for blood pressure   Gastrointestinal: Negative. Negative for abdominal pain, blood in stool, constipation, diarrhea, nausea and vomiting. Endocrine: Negative. Negative for cold intolerance and heat intolerance. Genitourinary: Negative. Negative for dysuria, frequency, hematuria, urgency, vaginal bleeding, vaginal discharge and vaginal pain. Musculoskeletal: Negative. Skin: Negative. Allergic/Immunologic: Negative. Neurological: Negative. Hematological: Negative. Negative for adenopathy. Psychiatric/Behavioral: Negative. Objective:      /72   Ht 5' 4" (1.626 m)   Wt 131 kg (288 lb 6.4 oz)   LMP 08/01/2021 (Approximate)   BMI 49.50 kg/m²          Physical Exam  Constitutional:       General: She is not in acute distress. Appearance: Normal appearance. She is well-developed. She is not diaphoretic. HENT:      Head: Normocephalic and atraumatic. Eyes:      Pupils: Pupils are equal, round, and reactive to light. Cardiovascular:      Rate and Rhythm: Normal rate and regular rhythm. Heart sounds: Normal heart sounds. No murmur heard. No friction rub. No gallop.    Pulmonary:      Effort: Pulmonary effort is normal.      Breath sounds: Normal breath sounds. Chest:   Breasts:     Breasts are symmetrical.      Right: No inverted nipple, mass, nipple discharge, skin change or tenderness. Left: No inverted nipple, mass, nipple discharge, skin change or tenderness. Abdominal:      General: Bowel sounds are normal.      Palpations: Abdomen is soft. Genitourinary:     General: Normal vulva. Exam position: Supine. Labia:         Right: No rash or lesion. Left: No rash or lesion. Urethra: No urethral swelling or urethral lesion. Vagina: Normal. No vaginal discharge, erythema, tenderness or bleeding. Cervix: No discharge or friability. Uterus: Not enlarged and not tender. Adnexa:         Right: No mass, tenderness or fullness. Left: No mass, tenderness or fullness. Rectum: Normal. Guaiac result negative. Comments: Pelvic exam revealed mild atrophic vaginitis  Good pelvic support confirmed  Musculoskeletal:         General: Normal range of motion. Cervical back: Normal range of motion and neck supple. Lymphadenopathy:      Cervical: No cervical adenopathy. Upper Body:      Right upper body: No supraclavicular adenopathy. Left upper body: No supraclavicular adenopathy. Skin:     General: Skin is warm and dry. Findings: No rash. Neurological:      General: No focal deficit present. Mental Status: She is alert and oriented to person, place, and time. Psychiatric:         Mood and Affect: Mood normal.         Speech: Speech normal.         Behavior: Behavior normal.         Thought Content:  Thought content normal.         Judgment: Judgment normal.

## 2023-11-09 NOTE — PATIENT INSTRUCTIONS
Normal gynecological physical examination. Self-breast examination stressed. Mammogram ordered. Discussed regular exercise, healthy diet, importance of vitamin D and calcium supplements. Discussed importance of sun block use during periods of prolonged sun exposure. Patient will be seen in 1 year for routine gynecologic and medical examination. Patient will call office for any problems, concerns, or issues which may arise during the interim. We will order baseline pelvic ultrasound in light of intermittent episodes of pelvic discomfort.

## 2023-11-13 ENCOUNTER — ULTRASOUND (OUTPATIENT)
Dept: OBGYN CLINIC | Facility: CLINIC | Age: 53
End: 2023-11-13

## 2023-11-13 DIAGNOSIS — R10.2 PELVIC PAIN: Primary | ICD-10-CM

## 2023-11-13 DIAGNOSIS — R35.0 URINE FREQUENCY: ICD-10-CM

## 2023-11-13 LAB
CLINICAL INFO: NORMAL
CYTO CVX: NORMAL
CYTOLOGY CMNT CVX/VAG CYTO-IMP: NORMAL
DATE PREVIOUS BX: NORMAL
LMP START DATE: NORMAL
SL AMB PREV. PAP:: NORMAL
SPECIMEN SOURCE CVX/VAG CYTO: NORMAL

## 2023-11-13 NOTE — PROGRESS NOTES
AMB US Pelvic Non OB    Date/Time: 11/13/2023 9:15 AM    Performed by: Karla Frey  Authorized by: Mehrdad Donato MD    Procedure details:     Indications: non-obstetric abdominal pain      Technique:  US Pelvic, Non-OB with complete exam  Uterine findings:     Length (cm): 6.6    Height (cm):  4.2    Width (cm):  3.5    Uterine adhesions: not identified      Adnexal mass: not identified      Polyps: not identified      Myomas: not identified      Endometrial stripe: identified      Endometrial hyperplasia: not identified      Endometrium thickness (mm):  4  Left ovary findings:     Left ovary:  Visualized    Cysts: not identified      Length (cm): 2.1    Height (cm): 2.1    Width (cm): 1.8  Right ovary findings:     Cysts: not identified      Length (cm): 2.6    Height (cm): 1.8    Width (cm): 1.2  Other findings:     Free pelvic fluid: not identified      Free peritoneal fluid: not identified    Post-Procedure Details:     Impression:  Endo-4, WNL    Tolerance: Tolerated well, no immediate complications  Additional Procedure Comments:          Dr. Mary Shields.  MD  [address] [place] [address] Rd  [address] [place] [address] NINA Bustos, [address] [facility]  [phone]

## 2023-11-20 ENCOUNTER — TELEPHONE (OUTPATIENT)
Dept: OBGYN CLINIC | Facility: CLINIC | Age: 53
End: 2023-11-20

## 2024-01-14 NOTE — PROGRESS NOTES
PT Evaluation     Today's date: 1/15/2024  Patient name: Reetha L Dubinkin  : 1970  MRN: [id number]  Referring provider: Dheeraj Rojas, *  Dx:   Encounter Diagnosis     ICD-10-CM    1. Pelvic pain  R10.2 Ambulatory Referral to Physical Therapy      2. Urine frequency  R35.0 Ambulatory Referral to Physical Therapy                     Assessment  Assessment details: Reetha L Dubinkin is a 53 y.o. female who presents with concerns of pelvic pain with intimacy, urinary urgency and frequency and mixed urinary incontinence. Examination reveals mildly hypertonic pelvic floor muscles that do not relax well, mild tenderness to palpation, and overall pelvic floor weakness.       The plan of care was discussed and included education regarding pelvic floor anatomy, explanation of exam technique, explanation of exam findings and discussion of treatment plan as well as the importance of patient compliance and adherence to physical therapy visits. Patient would benefit from skilled physical therapy services  to address deficits and ultimately meet goal of independent self management of condition.     Patient provided written and verbal consent for pelvic floor muscle exam: yes        Impairments: abnormal coordination, abnormal muscle firing, abnormal muscle tone, abnormal or restricted ROM, activity intolerance, impaired physical strength, lacks appropriate home exercise program and pain with function  Understanding of Dx/Px/POC: good   Prognosis: good    Goals  STGs to be met in 4 weeks:  * Patient will be compliant with introductory HEP as prescribed.  * Patient presents with good understanding of pelvic floor protective strategies to reduce intra-abdominal pressure against pelvic organs.    LTGs to be met by discharge:  * Pelvic Floor Distress Inventory  (nonsurg KRISTY > 13.5 pts, surg KRISTY > 45 pts)     * Normalize sEMG findings to indicate strength average > 12uV and resting average < 2.0uV.   * Demonstrates  correct isolation and relaxation of pelvic floor to palpation without overflow from global stabilizers.  * Reports that she/he can cough/laugh/sneeze with at least 75% less bladder leakage.  * Implements relaxation strategies on a daily basis.  * Patient will report 80 % reduction in discomfort with either palpation or intimacy.  * Patient implements urge suppression strategies throughout the day and reports that her average voiding interval is 2+ hours upon discharge.   * Patient will use pelvic floor muscles correctly during functional ADLs such as coughing, sneezing, lifting and exercise activities to avoid excessive IAP and PFM strain.   * Patient will be compliant with comprehensive home exercise program for self management of condition.     Plan  Patient would benefit from: skilled physical therapy  Referral necessary: No  Planned modality interventions: biofeedback  Planned therapy interventions: manual therapy, motor coordination training, neuromuscular re-education, patient education, therapeutic exercise, home exercise program, behavior modification and breathing training  Frequency: 1x week  Duration in weeks: 12  Plan of Care beginning date: 1/15/2024  Plan of Care expiration date: 2024  Treatment plan discussed with: patient    PT Pelvic Floor Subjective:   History of Present Illness:   Patient is a  who presents with concern of worsening urinary frequency and urgency as well as left sided lower abdominal pain. She has been menopausal 2+ years and reports low libido, dryness (helped with cocunut oil) and pain with vaginal pain with insertion.     Reports that she has chronic joint pain especially in her knees R>L. This limits her walking, which is her chosen form of exercise.     Wears compression knee highs and reports that her medical providers have told her that her swelling in lower legs is due to begin obese. This has happened x 10 years and with compression garments.     Urine leakage with  "cough and sneeze and walking to BR in morning - sometimes it just pours out of me. BR is on second floor. Wears Poise #4 24 hours/day.        Recurrent probem    Quality of life: good    Social Support:     Lives with:  Spouse    Relationship status: /committed    Work status: employed full time (2 part time jobs - [employer], [employer] in [place] -35 hpw)  Diet and Exercise:      Minimal    OB/ gyn History    Gestational History:     Prior Pregnancy: No      Number of prior pregnancies: 0    Number of term pregnancies: 0    Menstrual History:      Menopausal: menopause  no hormone replacement therapy  OTC supplement for hormone balance  Bladder Function:     Voiding Difficulties positive for: urgency and frequent urination       Voiding Difficulties comments:     Voiding frequency: every 31-60 minutes and every 1-2 hours    Urinary leakage: urine leakage    Urinary leakage aggravated by: coughing, sneezing, walking to the bathroom and post-void dribble    Nocturia (episodes per night): 0 and 1    Painful urination: No      Intake (ounces):     Intake (ounces) comment: Normal Day:  Cup of tea before leaving house  Coconut water + water when she returns house   Cup of tea or hot lemon water in evening   Max of fluids in day: \"quart at the most\"  Bowel Function:     Voiding DIfficulties: painful defecating, unfinished feeling after defecating and constipation      Bowel Function comments:  Reports that she often needs to manually press on perineum to evacuate stool     Bowel frequency: daily and every 2 days    [place] Stool Scale: type 2 and type 3    Stool softener use: no stool softeners    Enema use: no enema    Uses \"squatty potty\": no Squatty Potty  Sexual Function:     Sexually Active:  Sexually active    Pain during intercourse: Yes      Lubrication Use: Yes    Patient wishes to return to having intercourse: currently unable to have intercourse but wants to  Diagnostic Tests:     None    Treatments:     " None    Patient Goals:     Patient goals for therapy:  Improved quality of life, improved pain management, relaxation, improved comfort, improved bladder or bowel function, fully empty bladder or bowels and decreased pain      Objective       Abdominal Assessment:      Abdominal Assessment: na      General Perineum Exam:   perineum intact.     General perineum exam comments: No discharge, irritation, organ prolapse or skin breakdown evident    Unable to visualize for organ prolapse however mild/moderate descent palpated with bear down       Visual Inspection of Perineum:   Excursion of perineal body in cephalad direction with contraction of pelvic floor muscles (PFM): delayed , fair  and good  Excursion of perineal body in caudal direction with relaxation of pelvic floor muscles (PFM): delayed  and weak  Involuntary contraction with coughing: no  Involuntary relaxation with bearing down: no  Cotton swab test: non-tender  Cough reflex: no cough reflex  Sphincter Tone Resting: weak  Sphincter Tone Squeeze: weak  Sensation: intact        Pelvic Floor Muscle Exam:      Breathing pattern with contraction: holding breath   Pelvic floor muscle relaxation is incomplete.   40% pelvic floor relaxation        PERFECT Score   Power right: 2/5   Power left: 2/5   Endurance (seconds to max): 5   Repetitions (before fatigue): 4        pelvic floor exam consent given by patient    Pelvic exam completed: vaginally     SMEG Biofeedback   to be assessed next treatment           Precautions:   Patient Active Problem List   Diagnosis    Anxiety    Bilateral leg edema    Essential hypertension    Hyperlipidemia    Class 3 severe obesity due to excess calories without serious comorbidity with body mass index (BMI) of 50.0 to 59.9 in adult (HCC)    Osteoarthritis of knee    Seasonal allergies    Venous stasis dermatitis    Sacroiliitis (HCC)    History of Helicobacter pylori infection    Vegetarian    Gastroesophageal reflux disease  without esophagitis    Pre-diabetes    MARKUS (obstructive sleep apnea)    Acute left lower quadrant pain    Urinary urgency         PRO EVAL RE-EVAL DISCHARGE   PFDI      ISAAC-18      VPQ      CPSI-NIH      PGQ          POC Expires Auth Status Start Date Exp Date PT Visit Limit DA expires DA provider   4/8           3 unit limit!!    Date of Service 1/15           Visits Used 1           Visits Remaining                        Manuals            Manual cueing for proper PFM + breath and active relaxation  **                                              Neuro Re-Ed                                                                                                Ther Ex                                                                                    Ther Activity            Education Anatomy and POC  Hydration recs, urge suppression                                                                      Modalities

## 2024-01-15 ENCOUNTER — EVALUATION (OUTPATIENT)
Dept: PHYSICAL THERAPY | Facility: REHABILITATION | Age: 54
End: 2024-01-15
Payer: COMMERCIAL

## 2024-01-15 DIAGNOSIS — I10 ESSENTIAL HYPERTENSION: ICD-10-CM

## 2024-01-15 DIAGNOSIS — M62.89 PELVIC FLOOR DYSFUNCTION IN FEMALE: Primary | ICD-10-CM

## 2024-01-15 DIAGNOSIS — R10.2 PELVIC PAIN: ICD-10-CM

## 2024-01-15 DIAGNOSIS — R35.0 URINE FREQUENCY: ICD-10-CM

## 2024-01-15 PROCEDURE — 97162 PT EVAL MOD COMPLEX 30 MIN: CPT | Performed by: PHYSICAL THERAPIST

## 2024-01-15 PROCEDURE — 97530 THERAPEUTIC ACTIVITIES: CPT | Performed by: PHYSICAL THERAPIST

## 2024-01-16 RX ORDER — LOSARTAN POTASSIUM AND HYDROCHLOROTHIAZIDE 12.5; 5 MG/1; MG/1
1 TABLET ORAL DAILY
Qty: 90 TABLET | Refills: 0 | Status: SHIPPED | OUTPATIENT
Start: 2024-01-16

## 2024-01-22 ENCOUNTER — APPOINTMENT (OUTPATIENT)
Dept: PHYSICAL THERAPY | Facility: REHABILITATION | Age: 54
End: 2024-01-22
Payer: COMMERCIAL

## 2024-01-22 NOTE — PROGRESS NOTES
Daily Note     Today's date: 2024  Patient name: Reetha L Dubinkin  : 1970  MRN: [id number]  Referring provider: Dheeraj Rojas, *  Dx: No diagnosis found.               History of Present Illness:   Patient is a  who presents with concern of worsening urinary frequency and urgency as well as left sided lower abdominal pain. She has been menopausal 2+ years and reports low libido, dryness (helped with cocunut oil) and pain with vaginal pain with insertion.     Reports that she has chronic joint pain especially in her knees R>L. This limits her walking, which is her chosen form of exercise.     Wears compression knee highs and reports that her medical providers have told her that her swelling in lower legs is due to begin obese. This has happened x 10 years and with compression garments.     Urine leakage with cough and sneeze and walking to BR in morning - sometimes it just pours out of me. BR is on second floor. Wears Poise #4 24 hours/day.        Recurrent probem    Quality of life: good    Subjective: ***      Objective: See treatment diary below      Assessment: Tolerated treatment {Tolerated treatment :0066310675}. Patient {assessment:8023615335}      Plan: {PLAN:2663002946}     Precautions:   Patient Active Problem List   Diagnosis    Anxiety    Bilateral leg edema    Essential hypertension    Hyperlipidemia    Class 3 severe obesity due to excess calories without serious comorbidity with body mass index (BMI) of 50.0 to 59.9 in adult (HCC)    Osteoarthritis of knee    Seasonal allergies    Venous stasis dermatitis    Sacroiliitis (HCC)    History of Helicobacter pylori infection    Vegetarian    Gastroesophageal reflux disease without esophagitis    Pre-diabetes    MARKUS (obstructive sleep apnea)    Acute left lower quadrant pain    Urinary urgency         PRO EVAL RE-EVAL DISCHARGE   PFDI      ISAAC-18      VPQ      CPSI-NIH      PGQ          POC Expires Auth Status Start Date Exp Date PT  Visit Limit DA expires DA provider   4/8           3 unit limit!!    Date of Service 1/15           Visits Used 1           Visits Remaining                        Manuals            Manual cueing for proper PFM + breath and active relaxation  **                                              Neuro Re-Ed                                                                                                Ther Ex                                                                                    Ther Activity            Education Anatomy and POC  Hydration recs, urge suppression                                                                      Modalities

## 2024-01-29 ENCOUNTER — OFFICE VISIT (OUTPATIENT)
Dept: PHYSICAL THERAPY | Facility: REHABILITATION | Age: 54
End: 2024-01-29
Payer: COMMERCIAL

## 2024-01-29 DIAGNOSIS — R10.2 PELVIC PAIN: Primary | ICD-10-CM

## 2024-01-29 DIAGNOSIS — R35.0 URINE FREQUENCY: ICD-10-CM

## 2024-01-29 PROCEDURE — 97110 THERAPEUTIC EXERCISES: CPT

## 2024-01-29 PROCEDURE — 97140 MANUAL THERAPY 1/> REGIONS: CPT

## 2024-01-29 PROCEDURE — 97112 NEUROMUSCULAR REEDUCATION: CPT

## 2024-01-29 NOTE — PROGRESS NOTES
Daily Note     Today's date: 2024  Patient name: Reetha L Dubinkin  : 1970  MRN: [id number]  Referring provider: Dheeraj Rojas, *  Dx:   Encounter Diagnosis     ICD-10-CM    1. Pelvic pain  R10.2       2. Urine frequency  R35.0           Start Time: 0845  Stop Time: 0930  Total time in clinic (min): 45 minutes    History of Present Illness:   Patient is a  who presents with concern of worsening urinary frequency and urgency as well as left sided lower abdominal pain. She has been menopausal 2+ years and reports low libido, dryness (helped with cocunut oil) and pain with vaginal pain with insertion.     Reports that she has chronic joint pain especially in her knees R>L. This limits her walking, which is her chosen form of exercise.     Wears compression knee highs and reports that her medical providers have told her that her swelling in lower legs is due to begin obese. This has happened x 10 years and with compression garments.     Urine leakage with cough and sneeze and walking to BR in morning - sometimes it just pours out of me. BR is on second floor. Wears Poise #4 24 hours/day.        Recurrent probem    Quality of life: good    Subjective: Pt denies any changes, reports her  being ill.       Objective: See treatment diary below    PFDI score 45    Assessment: Tolerated treatment well. Patient would benefit from continued PT. Pt was agreeable to PFM cueing, more challenged to relax.  Given cueing, worked on breathing and instructed how to perform pelvic elevators.  Discussed increasing her water intake slowly and urge deferral.      Plan: Continue per plan of care.      Precautions:   Patient Active Problem List   Diagnosis    Anxiety    Bilateral leg edema    Essential hypertension    Hyperlipidemia    Class 3 severe obesity due to excess calories without serious comorbidity with body mass index (BMI) of 50.0 to 59.9 in adult (HCC)    Osteoarthritis of knee    Seasonal allergies     Venous stasis dermatitis    Sacroiliitis (HCC)    History of Helicobacter pylori infection    Vegetarian    Gastroesophageal reflux disease without esophagitis    Pre-diabetes    MARKUS (obstructive sleep apnea)    Acute left lower quadrant pain    Urinary urgency         PRO EVAL RE-EVAL DISCHARGE   PFDI      ISAAC-18      VPQ      CPSI-NIH      PGQ          POC Expires Auth Status Start Date Exp Date PT Visit Limit DA expires DA provider   4/8           3 unit limit!!    Date of Service 1/15 1/29          Visits Used 1 2          Visits Remaining            PFDI score  45          Manuals            Manual cueing for proper PFM + breath and active relaxation  10'                                              Neuro Re-Ed                        DB  10'          Pelvic elevators  13'                                                          Ther Ex                        Nustep  8'                                                          Ther Activity            Education Anatomy and POC  Hydration recs, urge suppression during session                                                                      Modalities

## 2024-02-05 ENCOUNTER — HOSPITAL ENCOUNTER (OUTPATIENT)
Dept: RADIOLOGY | Age: 54
Discharge: HOME | End: 2024-02-05
Payer: COMMERCIAL

## 2024-02-05 ENCOUNTER — OFFICE VISIT (OUTPATIENT)
Dept: PHYSICAL THERAPY | Facility: REHABILITATION | Age: 54
End: 2024-02-05
Payer: COMMERCIAL

## 2024-02-05 VITALS — BODY MASS INDEX: 50.02 KG/M2 | HEIGHT: 64 IN | WEIGHT: 293 LBS

## 2024-02-05 DIAGNOSIS — R35.0 URINE FREQUENCY: ICD-10-CM

## 2024-02-05 DIAGNOSIS — Z12.31 ENCOUNTER FOR SCREENING MAMMOGRAM FOR MALIGNANT NEOPLASM OF BREAST: ICD-10-CM

## 2024-02-05 DIAGNOSIS — M62.89 PELVIC FLOOR DYSFUNCTION IN FEMALE: ICD-10-CM

## 2024-02-05 DIAGNOSIS — R10.2 PELVIC PAIN: Primary | ICD-10-CM

## 2024-02-05 PROCEDURE — 77067 SCR MAMMO BI INCL CAD: CPT

## 2024-02-05 PROCEDURE — 97140 MANUAL THERAPY 1/> REGIONS: CPT | Performed by: PHYSICAL THERAPIST

## 2024-02-05 PROCEDURE — 77063 BREAST TOMOSYNTHESIS BI: CPT

## 2024-02-05 PROCEDURE — 97110 THERAPEUTIC EXERCISES: CPT | Performed by: PHYSICAL THERAPIST

## 2024-02-05 NOTE — PROGRESS NOTES
"Daily Note     Today's date: 2024  Patient name: Reetha L Dubinkin  : 1970  MRN: [id number]  Referring provider: Dheeraj Rojas, *  Dx:   Encounter Diagnosis     ICD-10-CM    1. Pelvic pain  R10.2       2. Urine frequency  R35.0       3. Pelvic floor dysfunction in female  M62.89           Start Time: 0800  Stop Time: 0855  Total time in clinic (min): 55 minutes    History of Present Illness:   Patient is a  who presents with concern of worsening urinary frequency and urgency as well as left sided lower abdominal pain. She has been menopausal 2+ years and reports low libido, dryness (helped with cocunut oil) and pain with vaginal pain with insertion.     Reports that she has chronic joint pain especially in her knees R>L. This limits her walking, which is her chosen form of exercise.     Wears compression knee highs and reports that her medical providers have told her that her swelling in lower legs is due to begin obese. This has happened x 10 years and with compression garments.     Urine leakage with cough and sneeze and walking to BR in morning - sometimes it just pours out of me. BR is on second floor. Wears Poise #4 24 hours/day.        Recurrent probem    Quality of life: good    Subjective: Pt reports a little less leakage this week. \"I have my ups and downs.\"    She has been semi-compliant with her elevator HEP.       Objective: See treatment diary below    PFDI score 45    Assessment: Tolerated treatment well. Patient would benefit from continued PT. Demonstrated understanding of breath patterns with PFM and could use more work with elevators, sav relaxation phase. Since she didn't do HEP, I didn't add anything and strongly encouraged that she practice at least twice daily. Also encouraged knacks with cough/sneeze. Would benefit from BF nv.       Plan: Continue per plan of care.      Precautions:   Patient Active Problem List   Diagnosis    Anxiety    Bilateral leg edema    Essential " "hypertension    Hyperlipidemia    Class 3 severe obesity due to excess calories without serious comorbidity with body mass index (BMI) of 50.0 to 59.9 in adult (HCC)    Osteoarthritis of knee    Seasonal allergies    Venous stasis dermatitis    Sacroiliitis (HCC)    History of Helicobacter pylori infection    Vegetarian    Gastroesophageal reflux disease without esophagitis    Pre-diabetes    MARKUS (obstructive sleep apnea)    Acute left lower quadrant pain    Urinary urgency         PRO EVAL RE-EVAL DISCHARGE   PFDI      ISAAC-18      VPQ      CPSI-NIH      PGQ          POC Expires Auth Status Start Date Exp Date PT Visit Limit DA expires DA provider   4/8 20 visits          3 unit limit!!    Date of Service 1/15 1/29 2/5         Visits Used 1 2 3         Visits Remaining   17         PFDI score  45          Manuals            Manual cueing for proper PFM + breath and active relaxation  10' 15'         PFM release   15'                                 Neuro Re-Ed            BF   nv         DB  10'          Pelvic elevators  13'                                                          Ther Ex            HS stretch   30\"x3         Nustep  8' L5x8'                                                         Ther Activity            Education Anatomy and POC  Hydration recs, urge suppression during session                                                                      Modalities                                           "

## 2024-02-07 ENCOUNTER — TELEPHONE (OUTPATIENT)
Dept: FAMILY MEDICINE CLINIC | Facility: CLINIC | Age: 54
End: 2024-02-07

## 2024-02-07 NOTE — TELEPHONE ENCOUNTER
Left message for patient----- Message from SARAH Greenwood sent at 2/7/2024 11:17 AM EST -----  Please let the patient know her mammogram was normal

## 2024-02-14 NOTE — TELEPHONE ENCOUNTER
Jillian called me back at [phone] and I confirmed her . Jillian confirmed that she had seen her [facility] NIPS report that showed her test specimen failed due to quality metrics. We discussed that NIPS is a serum test designed to identify fragments of placental DNA in maternal blood, but that maternal DNA fragments are also present in any maternal blood sample. Of note, Jillian is currently undergoing treatment for breast cancer and takes enoxaparin. Various maternal factors that increase cell turnover/apoptosis, including LMWH usage, can reduce fetal fraction and/or impact cell free DNA quality. Maternal malignancy may also impact NIPS results.    We discussed that Jillian's breast cancer and medication usage are both reasonable explanations for her NIPS test failure. We reviewed that a redraw is possible, but it is highly possible it may fail again. We discussed that this test result in itself does not impact the risk for aneuploidy in this pregnancy; the test failure does not rule it out, but the aforementioned factors are sufficient to explain the test failure rather than it being suggestive of aneuploidy. We reviewed the options of quad screening and amniocentesis as alternative means of aneuploidy testing in pregnancy. The patient is not interested in diagnostic testing due to fear of miscarriage, but is interested in quad screening. We also discussed the possibility of an early anatomy ultrasound given the unique medical situation and reduced ability to screen for aneuploidy via NIPS. I will consult with our medical director and contact Jillian again to finalize our plan.     Jillian agreed with this course of action and had no further questions. I provided her with my new callback number of [phone].    Margarita Shelton, [facility] [place]

## 2024-02-15 ENCOUNTER — OFFICE VISIT (OUTPATIENT)
Dept: PHYSICAL THERAPY | Facility: REHABILITATION | Age: 54
End: 2024-02-15
Payer: COMMERCIAL

## 2024-02-15 DIAGNOSIS — R35.0 URINE FREQUENCY: ICD-10-CM

## 2024-02-15 DIAGNOSIS — R10.2 PELVIC PAIN: Primary | ICD-10-CM

## 2024-02-15 DIAGNOSIS — M62.89 PELVIC FLOOR DYSFUNCTION IN FEMALE: ICD-10-CM

## 2024-02-15 PROCEDURE — 97140 MANUAL THERAPY 1/> REGIONS: CPT

## 2024-02-15 NOTE — PROGRESS NOTES
Daily Note     Today's date: 2/15/2024  Patient name: Reetha L Dubinkin  : 1970  MRN: [id number]  Referring provider: Dheeraj Rojas, *  Dx:   Encounter Diagnosis     ICD-10-CM    1. Pelvic pain  R10.2       2. Urine frequency  R35.0       3. Pelvic floor dysfunction in female  M62.89           Start Time: 0930  Stop Time: 1015  Total time in clinic (min): 45 minutes    History of Present Illness:   Patient is a  who presents with concern of worsening urinary frequency and urgency as well as left sided lower abdominal pain. She has been menopausal 2+ years and reports low libido, dryness (helped with cocunut oil) and pain with vaginal pain with insertion.     Reports that she has chronic joint pain especially in her knees R>L. This limits her walking, which is her chosen form of exercise.     Wears compression knee highs and reports that her medical providers have told her that her swelling in lower legs is due to begin obese. This has happened x 10 years and with compression garments.     Urine leakage with cough and sneeze and walking to BR in morning - sometimes it just pours out of me. BR is on second floor. Wears Poise #4 24 hours/day.        Recurrent probem    Quality of life: good    Subjective: Pt reports doing well and overall has noticed some improvement, felt like she could hold her urine longer than before and had less urges. She has been practicing a lot during the day. States her knees are bothering her today more than normal.        Objective: See treatment diary below.  BIO FEEDBACK 2/15/24:      5sec Work 10 sec Rest:    BF supine: Work:   Avg 11.2     Min 2.9    Max 21.9        W-R Rise  7.03                    Rest:            4.2             2.4            14.3    BF seated  Work:   Avg 4.6    Min 1.5      Max 9.2          W-R Rise  2.78                    Rest:            1.8           1.1             6.7    PFDI score 45    Assessment: Tolerated treatment well with focus on  Bio feedback for PFM education and awareness. Patient performed elevators, c/r with patient's consent in supine & sitting with good tolerance to activity. Patient displayed adequate PF CX, slightly below normal averages during contract phase and below normal ranges during relaxation phase in supine position. (See objective) Patient is challenged to fully release PFM due to tension. In seated patient displayed weaker PFM CX; however displayed adequate ability to relax. Patient advised to try using a rolled up towel in seated position to pelvic region while performing Kegels to provide feedback to relaxation phase; to physically feel the drop of pelvic floor into towel.  Patient would benefit from skilled Pt to address deficits and restore PLOF.     .       Plan: Continue per plan of care.      Precautions:   Patient Active Problem List   Diagnosis    Anxiety    Bilateral leg edema    Essential hypertension    Hyperlipidemia    Class 3 severe obesity due to excess calories without serious comorbidity with body mass index (BMI) of 50.0 to 59.9 in adult ([facility])    Osteoarthritis of knee    Seasonal allergies    Venous stasis dermatitis    Sacroiliitis ([facility])    History of Helicobacter pylori infection    Vegetarian    Gastroesophageal reflux disease without esophagitis    Pre-diabetes    MARKUS (obstructive sleep apnea)    Acute left lower quadrant pain    Urinary urgency         PRO EVAL RE-EVAL DISCHARGE   PFDI      ISAAC-18      VPQ      CPSI-NIH      PGQ          POC Expires Auth Status Start Date Exp Date PT Visit Limit DA expires DA provider   4/8 20 visits          3 unit limit!!    Date of Service 1/15 1/29 2/5 2/15        Visits Used 1 2 3 4        Visits Remaining   17 16        PFDI score  45          Manuals            Manual cueing for proper PFM + breath and active relaxation  10' 15'         PFM release   15'                                 Neuro Re-Ed            BF   nv 45'        DB  10'          Pelvic  "elevators  13'                                                          Ther Ex            HS stretch   30\"x3         Nustep  8' L5x8'                                                         Ther Activity            Education Anatomy and POC  Hydration recs, urge suppression during session                                                                      Modalities                                           "

## 2024-02-23 ENCOUNTER — OFFICE VISIT (OUTPATIENT)
Dept: PHYSICAL THERAPY | Facility: REHABILITATION | Age: 54
End: 2024-02-23
Payer: COMMERCIAL

## 2024-02-23 DIAGNOSIS — R10.2 PELVIC PAIN: Primary | ICD-10-CM

## 2024-02-23 DIAGNOSIS — R35.0 URINE FREQUENCY: ICD-10-CM

## 2024-02-23 DIAGNOSIS — M62.89 PELVIC FLOOR DYSFUNCTION IN FEMALE: ICD-10-CM

## 2024-02-23 PROCEDURE — 97110 THERAPEUTIC EXERCISES: CPT

## 2024-02-23 PROCEDURE — 97140 MANUAL THERAPY 1/> REGIONS: CPT

## 2024-02-23 NOTE — PROGRESS NOTES
Daily Note     Today's date: 2024  Patient name: Reetha L Dubinkin  : 1970  MRN: [id number]  Referring provider: Dheeraj Rojas, *  Dx:   Encounter Diagnosis     ICD-10-CM    1. Pelvic pain  R10.2       2. Urine frequency  R35.0       3. Pelvic floor dysfunction in female  M62.89             Start Time: 0730  Stop Time: 0815  Total time in clinic (min): 45 minutes    History of Present Illness:   Patient is a  who presents with concern of worsening urinary frequency and urgency as well as left sided lower abdominal pain. She has been menopausal 2+ years and reports low libido, dryness (helped with cocunut oil) and pain with vaginal pain with insertion.     Reports that she has chronic joint pain especially in her knees R>L. This limits her walking, which is her chosen form of exercise.     Wears compression knee highs and reports that her medical providers have told her that her swelling in lower legs is due to begin obese. This has happened x 10 years and with compression garments.     Urine leakage with cough and sneeze and walking to BR in morning - sometimes it just pours out of me. BR is on second floor. Wears Poise #4 24 hours/day.        Recurrent probem    Quality of life: good    Subjective: Pt feels she is more compliant with doing daily exercises but breaks them up and does not to them all.     Objective: See treatment diary below.  BIO FEEDBACK 2/15/24:      5sec Work 10 sec Rest:    BF supine: Work:   Avg 11.2     Min 2.9    Max 21.9        W-R Rise  7.03                    Rest:            4.2             2.4            14.3    BF seated  Work:   Avg 4.6    Min 1.5      Max 9.2          W-R Rise  2.78                    Rest:            1.8           1.1             6.7    PFDI score 45    Assessment: To improve compliance added a standing series w/ cueing to allow for full relaxation in between. Demonstrates good form. Added manual stretching of her hamstrings prior to  strengthening exercises.  Patient would benefit from skilled Pt to address deficits and restore PLOF.     Access Code: [email or web address]  URL: [email or web address]  Date: 02/23/2024  Prepared by: Myrna Reagan Notes  Anytime you are going against gravity (lifting your heels or legs) exhale and zipper up :)    Exercises  - Standing Heel Raise  - 1 x daily - 7 x weekly - 2 sets - 10 reps - 5 hold  - Standing Hip Abduction with Counter Support  - 1 x daily - 7 x weekly - 2 sets - 10 reps - 5 hold  - Standing Hip Abduction with Unilateral Counter Support  - 1 x daily - 7 x weekly - 2 sets - 10 reps - 5 hold  - Single Leg Balance with Pelvic Floor Contraction  - 1 x daily - 7 x weekly - 2 sets - 10 reps - 5 hold  - Seated Exhale with Pelvic Floor Contraction and Med Ball Lift  - 1 x daily - 7 x weekly - 2 sets - 10 reps - 5 hold  - Sidestepping with Pelvic Floor Contraction and Resistance  - 1 x daily - 7 x weekly - 2 sets - 10 reps - 5 hold  .   Goals  STGs to be met in 4 weeks:  * Patient will be compliant with introductory HEP as prescribed.  * Patient presents with good understanding of pelvic floor protective strategies to reduce intra-abdominal pressure against pelvic organs.     LTGs to be met by discharge:  * Pelvic Floor Distress Inventory  (nonsurg KRISTY > 13.5 pts, surg KRISTY > 45 pts)      * Normalize sEMG findings to indicate strength average > 12uV and resting average < 2.0uV.   * Demonstrates correct isolation and relaxation of pelvic floor to palpation without overflow from global stabilizers.  * Reports that she/he can cough/laugh/sneeze with at least 75% less bladder leakage.  * Implements relaxation strategies on a daily basis.  * Patient will report 80 % reduction in discomfort with either palpation or intimacy.  * Patient implements urge suppression strategies throughout the day and reports that her average voiding interval is 2+ hours upon discharge.   * Patient will use pelvic floor muscles  "correctly during functional ADLs such as coughing, sneezing, lifting and exercise activities to avoid excessive IAP and PFM strain.   * Patient will be compliant with comprehensive home exercise program for self management of condition.     Plan: Continue per plan of care.      Precautions:   Patient Active Problem List   Diagnosis    Anxiety    Bilateral leg edema    Essential hypertension    Hyperlipidemia    Class 3 severe obesity due to excess calories without serious comorbidity with body mass index (BMI) of 50.0 to 59.9 in adult ([facility])    Osteoarthritis of knee    Seasonal allergies    Venous stasis dermatitis    Sacroiliitis ([facility])    History of Helicobacter pylori infection    Vegetarian    Gastroesophageal reflux disease without esophagitis    Pre-diabetes    MARKUS (obstructive sleep apnea)    Acute left lower quadrant pain    Urinary urgency         PRO EVAL RE-EVAL DISCHARGE   PFDI      ISAAC-18      VPQ      CPSI-NIH      PGQ          POC Expires Auth Status Start Date Exp Date PT Visit Limit DA expires DA provider   4/8 20 visits          3 unit limit!!    Date of Service 1/15 1/29 2/5 2/15 2/23       Visits Used 1 2 3 4 5       Visits Remaining   17 16 15       PFDI score  45          Manuals            Manual cueing for proper PFM + breath and active relaxation  10' 15'         PFM release   15'         HS stretch     8'                   Neuro Re-Ed            BF   nv 45'        DB  10'          Pelvic elevators  13'                                                          Ther Ex            HS stretch   30\"x3         Nustep  8' L5x8'  L5 8'       Standing series HR, hip abd/ext, SLS     2x10 of each       Sidestepping w/ 50% kegel     4x                               Ther Activity            Education Anatomy and POC  Hydration recs, urge suppression during session                                                                      Modalities                                           "

## 2024-02-26 ENCOUNTER — OFFICE VISIT (OUTPATIENT)
Dept: PHYSICAL THERAPY | Facility: REHABILITATION | Age: 54
End: 2024-02-26
Payer: COMMERCIAL

## 2024-02-26 DIAGNOSIS — R10.2 PELVIC PAIN: Primary | ICD-10-CM

## 2024-02-26 DIAGNOSIS — M62.89 PELVIC FLOOR DYSFUNCTION IN FEMALE: ICD-10-CM

## 2024-02-26 DIAGNOSIS — R35.0 URINE FREQUENCY: ICD-10-CM

## 2024-02-26 PROCEDURE — 97112 NEUROMUSCULAR REEDUCATION: CPT | Performed by: PHYSICAL THERAPIST

## 2024-02-26 PROCEDURE — 97140 MANUAL THERAPY 1/> REGIONS: CPT | Performed by: PHYSICAL THERAPIST

## 2024-02-26 NOTE — PROGRESS NOTES
Daily Note     Today's date: 2024  Patient name: Reetha L Dubinkin  : 1970  MRN: [id number]  Referring provider: Dheeraj Rojas, *  Dx:   Encounter Diagnosis     ICD-10-CM    1. Pelvic pain  R10.2       2. Urine frequency  R35.0       3. Pelvic floor dysfunction in female  M62.89             Start Time: 1400  Stop Time: 1445  Total time in clinic (min): 45 minutes    History of Present Illness:   Patient is a  who presents with concern of worsening urinary frequency and urgency as well as left sided lower abdominal pain. She has been menopausal 2+ years and reports low libido, dryness (helped with cocunut oil) and pain with vaginal pain with insertion.     Reports that she has chronic joint pain especially in her knees R>L. This limits her walking, which is her chosen form of exercise.     Wears compression knee highs and reports that her medical providers have told her that her swelling in lower legs is due to begin obese. This has happened x 10 years and with compression garments.     Urine leakage with cough and sneeze and walking to BR in morning - sometimes it just pours out of me. BR is on second floor. Wears Poise #4 24 hours/day.        Recurrent probem    Quality of life: good    Subjective: Reports that she has been compliant with her exercises. Reports that she is utlizing her urge suppression strategies and feels like she is doing a little better with key in the door syndrome. Still leaking when transitioning from sit to stand after watching TV. It usually when she stands all the way up and has to go upstairs.     Objective: See treatment diary below.  BIO FEEDBACK 2/15/24:      5sec Work 10 sec Rest:    BF supine: Work:   Avg 11.2     Min 2.9    Max 21.9        W-R Rise  7.03                    Rest:            4.2             2.4            14.3    BF seated  Work:   Avg 4.6    Min 1.5      Max 9.2          W-R Rise  2.78                    Rest:            1.8           1.1              6.7    PFDI score 45    Assessment: To improve compliance added a standing series w/ cueing to allow for full relaxation in between. Demonstrates good form. Added manual stretching of her hamstrings prior to strengthening exercises.  Patient would benefit from skilled Pt to address deficits and restore PLOF.     Access Code: [email or web address]  URL: [email or web address]  Date: 02/23/2024  Prepared by: Myrna Reagan Notes  Anytime you are going against gravity (lifting your heels or legs) exhale and zipper up :)    Exercises  - Standing Heel Raise  - 1 x daily - 7 x weekly - 2 sets - 10 reps - 5 hold  - Standing Hip Abduction with Counter Support  - 1 x daily - 7 x weekly - 2 sets - 10 reps - 5 hold  - Standing Hip Abduction with Unilateral Counter Support  - 1 x daily - 7 x weekly - 2 sets - 10 reps - 5 hold  - Single Leg Balance with Pelvic Floor Contraction  - 1 x daily - 7 x weekly - 2 sets - 10 reps - 5 hold  - Seated Exhale with Pelvic Floor Contraction and Med Ball Lift  - 1 x daily - 7 x weekly - 2 sets - 10 reps - 5 hold  - Sidestepping with Pelvic Floor Contraction and Resistance  - 1 x daily - 7 x weekly - 2 sets - 10 reps - 5 hold  .   Goals  STGs to be met in 4 weeks:  * Patient will be compliant with introductory HEP as prescribed.  * Patient presents with good understanding of pelvic floor protective strategies to reduce intra-abdominal pressure against pelvic organs.     LTGs to be met by discharge:  * Pelvic Floor Distress Inventory  (nonsurg KRISTY > 13.5 pts, surg KRISTY > 45 pts)      * Normalize sEMG findings to indicate strength average > 12uV and resting average < 2.0uV.   * Demonstrates correct isolation and relaxation of pelvic floor to palpation without overflow from global stabilizers.  * Reports that she/he can cough/laugh/sneeze with at least 75% less bladder leakage.  * Implements relaxation strategies on a daily basis.  * Patient will report 80 % reduction in discomfort with  "either palpation or intimacy.  * Patient implements urge suppression strategies throughout the day and reports that her average voiding interval is 2+ hours upon discharge.   * Patient will use pelvic floor muscles correctly during functional ADLs such as coughing, sneezing, lifting and exercise activities to avoid excessive IAP and PFM strain.   * Patient will be compliant with comprehensive home exercise program for self management of condition.     Plan: Continue per plan of care.      Precautions:   Patient Active Problem List   Diagnosis    Anxiety    Bilateral leg edema    Essential hypertension    Hyperlipidemia    Class 3 severe obesity due to excess calories without serious comorbidity with body mass index (BMI) of 50.0 to 59.9 in adult ([facility])    Osteoarthritis of knee    Seasonal allergies    Venous stasis dermatitis    Sacroiliitis ([facility])    History of Helicobacter pylori infection    Vegetarian    Gastroesophageal reflux disease without esophagitis    Pre-diabetes    MARKUS (obstructive sleep apnea)    Acute left lower quadrant pain    Urinary urgency         PRO EVAL RE-EVAL DISCHARGE   PFDI      ISAAC-18      VPQ      CPSI-NIH      PGQ          POC Expires Auth Status Start Date Exp Date PT Visit Limit DA expires DA provider   4/8 20 visits          3 unit limit!!    Date of Service 1/15 1/29 2/5 2/15 2/23 2/26      Visits Used 1 2 3 4 5 6      Visits Remaining   17 16 15 14      PFDI score  45          Manuals            Manual cueing for proper PFM + breath and active relaxation  10' 15'         PFM release   15'         HS stretch     8' 10'                  Neuro Re-Ed            BF   nv 45'        DB  10'    + PFM      Pelvic elevators  13'                                                          Ther Ex            HS stretch   30\"x3         Nustep  8' L5x8'  L5 8' L4x10'      Standing series HR, hip abd/ext, SLS     2x10 of each Reviewed \"zipper + breath\" as step 1 and then add on movement as step #2    "   Sidestepping w/ 50% kegel     4x       Seated PFM anterior focus      X10 w/ breath                  Ther Activity            Education Anatomy and POC  Hydration recs, urge suppression during session                                                                      Modalities

## 2024-03-06 ENCOUNTER — OFFICE VISIT (OUTPATIENT)
Dept: PHYSICAL THERAPY | Facility: REHABILITATION | Age: 54
End: 2024-03-06
Payer: COMMERCIAL

## 2024-03-06 DIAGNOSIS — R10.2 PELVIC PAIN: Primary | ICD-10-CM

## 2024-03-06 DIAGNOSIS — R35.0 URINE FREQUENCY: ICD-10-CM

## 2024-03-06 DIAGNOSIS — M62.89 PELVIC FLOOR DYSFUNCTION IN FEMALE: ICD-10-CM

## 2024-03-06 PROCEDURE — 97110 THERAPEUTIC EXERCISES: CPT

## 2024-03-06 PROCEDURE — 97140 MANUAL THERAPY 1/> REGIONS: CPT

## 2024-03-06 NOTE — PROGRESS NOTES
"Daily Note     Today's date: 3/6/2024  Patient name: Reetha L Dubinkin  : 1970  MRN: [id number]  Referring provider: Dheeraj Rojas, *  Dx:   Encounter Diagnosis     ICD-10-CM    1. Pelvic pain  R10.2       2. Urine frequency  R35.0       3. Pelvic floor dysfunction in female  M62.89                        History of Present Illness:   Patient is a  who presents with concern of worsening urinary frequency and urgency as well as left sided lower abdominal pain. She has been menopausal 2+ years and reports low libido, dryness (helped with cocunut oil) and pain with vaginal pain with insertion.     Reports that she has chronic joint pain especially in her knees R>L. This limits her walking, which is her chosen form of exercise.     Wears compression knee highs and reports that her medical providers have told her that her swelling in lower legs is due to begin obese. This has happened x 10 years and with compression garments.     Urine leakage with cough and sneeze and walking to BR in morning - sometimes it just pours out of me. BR is on second floor. Wears Poise #4 24 hours/day.        Recurrent probem    Quality of life: good    Subjective: Pt leaks on her way to the bathroom in the morning without realizing it. Continues to find it challenging with her exercises.     Objective: See treatment diary below.  BIO FEEDBACK 2/15/24:      5sec Work 10 sec Rest:    BF supine: Work:   Avg 11.2     Min 2.9    Max 21.9        W-R Rise  7.03                    Rest:            4.2             2.4            14.3    BF seated  Work:   Avg 4.6    Min 1.5      Max 9.2          W-R Rise  2.78                    Rest:            1.8           1.1             6.7    PFDI score 45    Assessment: Encouraged to perform the urge deferral strategy \"turn off the faucet\" prior to getting out of bed in the morning as she often leaks without nocticing while walking to the bathroom.  Added de the metronome to practice quick " contraction and release, skip a bit in between set at 30.  Patient would benefit from skilled Pt to address deficits and restore PLOF.     Access Code: [email or web address]  URL: [email or web address]  Date: 02/23/2024  Prepared by: Myrna Reagan Notes  Anytime you are going against gravity (lifting your heels or legs) exhale and zipper up :)    Exercises  - Standing Heel Raise  - 1 x daily - 7 x weekly - 2 sets - 10 reps - 5 hold  - Standing Hip Abduction with Counter Support  - 1 x daily - 7 x weekly - 2 sets - 10 reps - 5 hold  - Standing Hip Abduction with Unilateral Counter Support  - 1 x daily - 7 x weekly - 2 sets - 10 reps - 5 hold  - Single Leg Balance with Pelvic Floor Contraction  - 1 x daily - 7 x weekly - 2 sets - 10 reps - 5 hold  - Seated Exhale with Pelvic Floor Contraction and Med Ball Lift  - 1 x daily - 7 x weekly - 2 sets - 10 reps - 5 hold  - Sidestepping with Pelvic Floor Contraction and Resistance  - 1 x daily - 7 x weekly - 2 sets - 10 reps - 5 hold  .   Goals  STGs to be met in 4 weeks:  * Patient will be compliant with introductory HEP as prescribed.  * Patient presents with good understanding of pelvic floor protective strategies to reduce intra-abdominal pressure against pelvic organs.     LTGs to be met by discharge:  * Pelvic Floor Distress Inventory  (nonsurg KRISTY > 13.5 pts, surg KRISTY > 45 pts)      * Normalize sEMG findings to indicate strength average > 12uV and resting average < 2.0uV.   * Demonstrates correct isolation and relaxation of pelvic floor to palpation without overflow from global stabilizers.  * Reports that she/he can cough/laugh/sneeze with at least 75% less bladder leakage.  * Implements relaxation strategies on a daily basis.  * Patient will report 80 % reduction in discomfort with either palpation or intimacy.  * Patient implements urge suppression strategies throughout the day and reports that her average voiding interval is 2+ hours upon discharge.   *  "Patient will use pelvic floor muscles correctly during functional ADLs such as coughing, sneezing, lifting and exercise activities to avoid excessive IAP and PFM strain.   * Patient will be compliant with comprehensive home exercise program for self management of condition.     Plan: Continue per plan of care.      Precautions:   Patient Active Problem List   Diagnosis    Anxiety    Bilateral leg edema    Essential hypertension    Hyperlipidemia    Class 3 severe obesity due to excess calories without serious comorbidity with body mass index (BMI) of 50.0 to 59.9 in adult ([facility])    Osteoarthritis of knee    Seasonal allergies    Venous stasis dermatitis    Sacroiliitis ([facility])    History of Helicobacter pylori infection    Vegetarian    Gastroesophageal reflux disease without esophagitis    Pre-diabetes    MARKUS (obstructive sleep apnea)    Acute left lower quadrant pain    Urinary urgency         PRO EVAL RE-EVAL DISCHARGE   PFDI      ISAAC-18      VPQ      CPSI-NIH      PGQ          POC Expires Auth Status Start Date Exp Date PT Visit Limit DA expires DA provider   4/8 20 visits          3 unit limit!!    Date of Service 1/15 1/29 2/5 2/15 2/23 2/26 3/6     Visits Used 1 2 3 4 5 6 7     Visits Remaining   17 16 15 14 13     PFDI score  45          Manuals            Manual cueing for proper PFM + breath and active relaxation  10' 15'         PFM release   15'         HS stretch     8' 10' 10'                 Neuro Re-Ed            BF   nv 45'        DB  10'    + PFM      Pelvic elevators  13'                                                          Ther Ex            HS stretch   30\"x3         Nustep  8' L5x8'  L5 8' L4x10' L4 10'     Standing series HR, hip abd/ext, SLS     2x10 of each Reviewed \"zipper + breath\" as step 1 and then add on movement as step #2 Practiced sitting and standing     Sidestepping w/ 50% kegel     4x       Seated PFM anterior focus      X10 w/ breath 2' with metronome (set at 30, ski a beat) " "\"vagina has a pulse cueing\"                  Ther Activity            Education Anatomy and POC  Hydration recs, urge suppression during session                                                                      Modalities                                           "

## 2024-03-13 ENCOUNTER — OFFICE VISIT (OUTPATIENT)
Dept: PHYSICAL THERAPY | Facility: REHABILITATION | Age: 54
End: 2024-03-13
Payer: COMMERCIAL

## 2024-03-13 DIAGNOSIS — R35.0 URINE FREQUENCY: ICD-10-CM

## 2024-03-13 DIAGNOSIS — M62.89 PELVIC FLOOR DYSFUNCTION IN FEMALE: ICD-10-CM

## 2024-03-13 DIAGNOSIS — R10.2 PELVIC PAIN: Primary | ICD-10-CM

## 2024-03-13 PROCEDURE — 97110 THERAPEUTIC EXERCISES: CPT

## 2024-03-13 NOTE — PROGRESS NOTES
Daily Note     Today's date: 3/13/2024  Patient name: Reetha L Dubinkin  : 1970  MRN: [id number]  Referring provider: Dheeraj Rojas, *  Dx:   Encounter Diagnosis     ICD-10-CM    1. Pelvic pain  R10.2       2. Urine frequency  R35.0       3. Pelvic floor dysfunction in female  M62.89               Start Time: 0820  Stop Time: 0900  Total time in clinic (min): 40 minutes    History of Present Illness:   Patient is a  who presents with concern of worsening urinary frequency and urgency as well as left sided lower abdominal pain. She has been menopausal 2+ years and reports low libido, dryness (helped with cocunut oil) and pain with vaginal pain with insertion.     Reports that she has chronic joint pain especially in her knees R>L. This limits her walking, which is her chosen form of exercise.     Wears compression knee highs and reports that her medical providers have told her that her swelling in lower legs is due to begin obese. This has happened x 10 years and with compression garments.     Urine leakage with cough and sneeze and walking to BR in morning - sometimes it just pours out of me. BR is on second floor. Wears Poise #4 24 hours/day.        Recurrent probem    Quality of life: good    Subjective: Is trying to be more consistent with her exercises but is bothered with mobility.    Objective: See treatment diary below.  BIO FEEDBACK 2/15/24:      5sec Work 10 sec Rest:    BF supine: Work:   Avg 11.2     Min 2.9    Max 21.9        W-R Rise  7.03                    Rest:            4.2             2.4            14.3    BF seated  Work:   Avg 4.6    Min 1.5      Max 9.2          W-R Rise  2.78                    Rest:            1.8           1.1             6.7    PFDI score 45    Assessment: Picture of her staircase was shared with me, suggested she try to go up sideways to see if it improves her symptoms. Worked on standing exercises today. .  Patient would benefit from skilled Pt to  address deficits and restore PLOF.     Access Code: [email or web address]  URL: [email or web address]  Date: 02/23/2024  Prepared by: Myrna Reagan Notes  Anytime you are going against gravity (lifting your heels or legs) exhale and zipper up :)    Exercises  - Standing Heel Raise  - 1 x daily - 7 x weekly - 2 sets - 10 reps - 5 hold  - Standing Hip Abduction with Counter Support  - 1 x daily - 7 x weekly - 2 sets - 10 reps - 5 hold  - Standing Hip Abduction with Unilateral Counter Support  - 1 x daily - 7 x weekly - 2 sets - 10 reps - 5 hold  - Single Leg Balance with Pelvic Floor Contraction  - 1 x daily - 7 x weekly - 2 sets - 10 reps - 5 hold  - Seated Exhale with Pelvic Floor Contraction and Med Ball Lift  - 1 x daily - 7 x weekly - 2 sets - 10 reps - 5 hold  - Sidestepping with Pelvic Floor Contraction and Resistance  - 1 x daily - 7 x weekly - 2 sets - 10 reps - 5 hold  .   Goals  STGs to be met in 4 weeks:  * Patient will be compliant with introductory HEP as prescribed.  * Patient presents with good understanding of pelvic floor protective strategies to reduce intra-abdominal pressure against pelvic organs.     LTGs to be met by discharge:  * Pelvic Floor Distress Inventory  (nonsurg KRISTY > 13.5 pts, surg KRISTY > 45 pts)      * Normalize sEMG findings to indicate strength average > 12uV and resting average < 2.0uV.   * Demonstrates correct isolation and relaxation of pelvic floor to palpation without overflow from global stabilizers.  * Reports that she/he can cough/laugh/sneeze with at least 75% less bladder leakage.  * Implements relaxation strategies on a daily basis.  * Patient will report 80 % reduction in discomfort with either palpation or intimacy.  * Patient implements urge suppression strategies throughout the day and reports that her average voiding interval is 2+ hours upon discharge.   * Patient will use pelvic floor muscles correctly during functional ADLs such as coughing, sneezing,  "lifting and exercise activities to avoid excessive IAP and PFM strain.   * Patient will be compliant with comprehensive home exercise program for self management of condition.     Plan: Continue per plan of care.      Precautions:   Patient Active Problem List   Diagnosis    Anxiety    Bilateral leg edema    Essential hypertension    Hyperlipidemia    Class 3 severe obesity due to excess calories without serious comorbidity with body mass index (BMI) of 50.0 to 59.9 in adult (HCC)    Osteoarthritis of knee    Seasonal allergies    Venous stasis dermatitis    Sacroiliitis (HCC)    History of Helicobacter pylori infection    Vegetarian    Gastroesophageal reflux disease without esophagitis    Pre-diabetes    MARKUS (obstructive sleep apnea)    Acute left lower quadrant pain    Urinary urgency         PRO EVAL RE-EVAL DISCHARGE   PFDI      ISAAC-18      VPQ      CPSI-NIH      PGQ          POC Expires Auth Status Start Date Exp Date PT Visit Limit DA expires DA provider   4/8 20 visits          3 unit limit!!    Date of Service 1/15 1/29 2/5 2/15 2/23 2/26 3/6 3/13    Visits Used 1 2 3 4 5 6 7 8    Visits Remaining   17 16 15 14 13 12    PFDI score  45          Manuals            Manual cueing for proper PFM + breath and active relaxation  10' 15'         PFM release   15'         HS stretch     8' 10' 10'                 Neuro Re-Ed            BF   nv 45'        DB  10'    + PFM      Pelvic elevators  13'                                                          Ther Ex            HS stretch   30\"x3         Nustep  8' L5x8'  L5 8' L4x10' L4 10' L4 10'    Standing series HR, hip abd/ext, SLS     2x10 of each Reviewed \"zipper + breath\" as step 1 and then add on movement as step #2 Practiced sitting and standing Practiced sitting and standing       Shoulder rows/chest press        2x10 YCO                Sidestepping w/ 50% kegel     4x   4x    Seated PFM anterior focus      X10 w/ breath 2' with metronome (set at 30, skip a " "beat) \"vagina has a pulse cueing\"                  Ther Activity            Education Anatomy and POC  Hydration recs, urge suppression during session                                                                      Modalities                                           "

## 2024-03-19 DIAGNOSIS — I10 ESSENTIAL HYPERTENSION: ICD-10-CM

## 2024-03-20 ENCOUNTER — OFFICE VISIT (OUTPATIENT)
Dept: PHYSICAL THERAPY | Facility: REHABILITATION | Age: 54
End: 2024-03-20
Payer: COMMERCIAL

## 2024-03-20 DIAGNOSIS — R10.2 PELVIC PAIN: Primary | ICD-10-CM

## 2024-03-20 DIAGNOSIS — R35.0 URINE FREQUENCY: ICD-10-CM

## 2024-03-20 DIAGNOSIS — M62.89 PELVIC FLOOR DYSFUNCTION IN FEMALE: ICD-10-CM

## 2024-03-20 PROCEDURE — 97110 THERAPEUTIC EXERCISES: CPT

## 2024-03-20 RX ORDER — LOSARTAN POTASSIUM AND HYDROCHLOROTHIAZIDE 12.5; 5 MG/1; MG/1
1 TABLET ORAL DAILY
Qty: 90 TABLET | Refills: 3 | Status: SHIPPED | OUTPATIENT
Start: 2024-03-20

## 2024-03-20 NOTE — PROGRESS NOTES
"Daily Note     Today's date: 3/20/2024  Patient name: Reetha L Dubinkin  : 1970  MRN: [id number]  Referring provider: Dheeraj Rojas, *  Dx:   Encounter Diagnosis     ICD-10-CM    1. Pelvic pain  R10.2       2. Urine frequency  R35.0       3. Pelvic floor dysfunction in female  M62.89                 Start Time: 0817  Stop Time: 0900  Total time in clinic (min): 43 minutes    History of Present Illness:   Patient is a  who presents with concern of worsening urinary frequency and urgency as well as left sided lower abdominal pain. She has been menopausal 2+ years and reports low libido, dryness (helped with cocunut oil) and pain with vaginal pain with insertion.     Reports that she has chronic joint pain especially in her knees R>L. This limits her walking, which is her chosen form of exercise.     Wears compression knee highs and reports that her medical providers have told her that her swelling in lower legs is due to begin obese. This has happened x 10 years and with compression garments.     Urine leakage with cough and sneeze and walking to BR in morning - sometimes it just pours out of me. BR is on second floor. Wears Poise #4 24 hours/day.        Recurrent probem    Quality of life: good    Subjective: \"I'm making progress\" denies any leakage this week. Objective: See treatment diary below.  BIO FEEDBACK 2/15/24:      5sec Work 10 sec Rest:    BF supine: Work:   Avg 11.2     Min 2.9    Max 21.9        W-R Rise  7.03                    Rest:            4.2             2.4            14.3    BF seated  Work:   Avg 4.6    Min 1.5      Max 9.2          W-R Rise  2.78                    Rest:            1.8           1.1             6.7    PFDI score 45    Assessment: Limited with mobility and certain exercises due to knee pain but did very well with upper body exercises.  .  Patient would benefit from skilled Pt to address deficits and restore PLOF. Discussed doing SEMG next session in seated " position.    Access Code: [email or web address]  URL: [email or web address]  Date: 02/23/2024  Prepared by: Myrna Reagan Notes  Anytime you are going against gravity (lifting your heels or legs) exhale and zipper up :)    Exercises  - Standing Heel Raise  - 1 x daily - 7 x weekly - 2 sets - 10 reps - 5 hold  - Standing Hip Abduction with Counter Support  - 1 x daily - 7 x weekly - 2 sets - 10 reps - 5 hold  - Standing Hip Abduction with Unilateral Counter Support  - 1 x daily - 7 x weekly - 2 sets - 10 reps - 5 hold  - Single Leg Balance with Pelvic Floor Contraction  - 1 x daily - 7 x weekly - 2 sets - 10 reps - 5 hold  - Seated Exhale with Pelvic Floor Contraction and Med Ball Lift  - 1 x daily - 7 x weekly - 2 sets - 10 reps - 5 hold  - Sidestepping with Pelvic Floor Contraction and Resistance  - 1 x daily - 7 x weekly - 2 sets - 10 reps - 5 hold  .   Goals  STGs to be met in 4 weeks:  * Patient will be compliant with introductory HEP as prescribed.  * Patient presents with good understanding of pelvic floor protective strategies to reduce intra-abdominal pressure against pelvic organs.     LTGs to be met by discharge:  * Pelvic Floor Distress Inventory  (nonsurg KRISTY > 13.5 pts, surg KRISTY > 45 pts)      * Normalize sEMG findings to indicate strength average > 12uV and resting average < 2.0uV.   * Demonstrates correct isolation and relaxation of pelvic floor to palpation without overflow from global stabilizers.  * Reports that she/he can cough/laugh/sneeze with at least 75% less bladder leakage.  * Implements relaxation strategies on a daily basis.  * Patient will report 80 % reduction in discomfort with either palpation or intimacy.  * Patient implements urge suppression strategies throughout the day and reports that her average voiding interval is 2+ hours upon discharge.   * Patient will use pelvic floor muscles correctly during functional ADLs such as coughing, sneezing, lifting and exercise activities  "to avoid excessive IAP and PFM strain.   * Patient will be compliant with comprehensive home exercise program for self management of condition.     Plan: Continue per plan of care.      Precautions:   Patient Active Problem List   Diagnosis    Anxiety    Bilateral leg edema    Essential hypertension    Hyperlipidemia    Class 3 severe obesity due to excess calories without serious comorbidity with body mass index (BMI) of 50.0 to 59.9 in adult ([facility])    Osteoarthritis of knee    Seasonal allergies    Venous stasis dermatitis    Sacroiliitis ([facility])    History of Helicobacter pylori infection    Vegetarian    Gastroesophageal reflux disease without esophagitis    Pre-diabetes    MARKUS (obstructive sleep apnea)    Acute left lower quadrant pain    Urinary urgency         PRO EVAL RE-EVAL DISCHARGE   PFDI      ISAAC-18      VPQ      CPSI-NIH      PGQ          POC Expires Auth Status Start Date Exp Date PT Visit Limit DA expires DA provider   4/8 20 visits          3 unit limit!!    Date of Service 1/15 1/29 2/5 2/15 2/23 2/26 3/6 3/13 3/20   Visits Used 1 2 3 4 5 6 7 8 9   Visits Remaining   17 16 15 14 13 12 11   PFDI score  45          Manuals            Manual cueing for proper PFM + breath and active relaxation  10' 15'         PFM release   15'         HS stretch     8' 10' 10'                 Neuro Re-Ed            BF   nv 45'        DB  10'    + PFM      Pelvic elevators  13'                                                          Ther Ex            HS stretch   30\"x3         Nustep  8' L5x8'  L5 8' L4x10' L4 10' L4 10' L4 8'   Standing series HR, hip abd/ext, SLS     2x10 of each Reviewed \"zipper + breath\" as step 1 and then add on movement as step #2 Practiced sitting and standing Practiced sitting and standing       Shoulder rows/chest press        2x10 YCO 2x10 YCO   Shoulder scaption         3# 2x10   Bicep curls         5# 2x10   Forward lunge         10x   Sidestepping w/ 50% kegel     4x   4x    Seated PFM " "anterior focus      X10 w/ breath 2' with metronome (set at 30, skip a beat) \"vagina has a pulse cueing\"                  Ther Activity            Education Anatomy and POC  Hydration recs, urge suppression during session                                                                      Modalities                                           "

## 2024-03-25 ENCOUNTER — APPOINTMENT (OUTPATIENT)
Dept: PHYSICAL THERAPY | Facility: REHABILITATION | Age: 54
End: 2024-03-25
Payer: COMMERCIAL

## 2024-03-25 DIAGNOSIS — R35.0 URINE FREQUENCY: ICD-10-CM

## 2024-03-25 DIAGNOSIS — R10.2 PELVIC PAIN: Primary | ICD-10-CM

## 2024-03-25 DIAGNOSIS — M62.89 PELVIC FLOOR DYSFUNCTION IN FEMALE: ICD-10-CM

## 2024-07-10 ENCOUNTER — APPOINTMENT (EMERGENCY)
Dept: RADIOLOGY | Facility: HOSPITAL | Age: 54
End: 2024-07-10
Payer: COMMERCIAL

## 2024-07-10 ENCOUNTER — HOSPITAL ENCOUNTER (EMERGENCY)
Facility: HOSPITAL | Age: 54
Discharge: HOME | End: 2024-07-10
Attending: EMERGENCY MEDICINE
Payer: COMMERCIAL

## 2024-07-10 VITALS
RESPIRATION RATE: 20 BRPM | OXYGEN SATURATION: 99 % | TEMPERATURE: 97.8 F | SYSTOLIC BLOOD PRESSURE: 136 MMHG | DIASTOLIC BLOOD PRESSURE: 84 MMHG | HEART RATE: 79 BPM

## 2024-07-10 DIAGNOSIS — R07.89 ATYPICAL CHEST PAIN: Primary | ICD-10-CM

## 2024-07-10 LAB
ALBUMIN SERPL BCG-MCNC: 4.4 G/DL (ref 3.5–5)
ALP SERPL-CCNC: 61 U/L (ref 34–104)
ALT SERPL W P-5'-P-CCNC: 37 U/L (ref 7–52)
ANION GAP SERPL CALCULATED.3IONS-SCNC: 8 MMOL/L (ref 4–13)
AST SERPL W P-5'-P-CCNC: 39 U/L (ref 13–39)
ATRIAL RATE: 76 BPM
BASOPHILS # BLD AUTO: 0.01 THOUSANDS/ÂΜL (ref 0–0.1)
BASOPHILS NFR BLD AUTO: 0 % (ref 0–1)
BILIRUB SERPL-MCNC: 0.75 MG/DL (ref 0.2–1)
BUN SERPL-MCNC: 11 MG/DL (ref 5–25)
CALCIUM SERPL-MCNC: 9.7 MG/DL (ref 8.4–10.2)
CARDIAC TROPONIN I PNL SERPL HS: 3 NG/L
CHLORIDE SERPL-SCNC: 104 MMOL/L (ref 96–108)
CO2 SERPL-SCNC: 27 MMOL/L (ref 21–32)
CREAT SERPL-MCNC: 0.81 MG/DL (ref 0.6–1.3)
EOSINOPHIL # BLD AUTO: 0.06 THOUSAND/ÂΜL (ref 0–0.61)
EOSINOPHIL NFR BLD AUTO: 1 % (ref 0–6)
ERYTHROCYTE [DISTWIDTH] IN BLOOD BY AUTOMATED COUNT: 12.6 % (ref 11.6–15.1)
GFR SERPL CREATININE-BSD FRML MDRD: 83 ML/MIN/1.73SQ M
GLUCOSE SERPL-MCNC: 102 MG/DL (ref 65–140)
HCT VFR BLD AUTO: 37.3 % (ref 34.8–46.1)
HGB BLD-MCNC: 12.2 G/DL (ref 11.5–15.4)
IMM GRANULOCYTES # BLD AUTO: 0.02 THOUSAND/UL (ref 0–0.2)
IMM GRANULOCYTES NFR BLD AUTO: 0 % (ref 0–2)
LYMPHOCYTES # BLD AUTO: 2.17 THOUSANDS/ÂΜL (ref 0.6–4.47)
LYMPHOCYTES NFR BLD AUTO: 42 % (ref 14–44)
MCH RBC QN AUTO: 29 PG (ref 26.8–34.3)
MCHC RBC AUTO-ENTMCNC: 32.7 G/DL (ref 31.4–37.4)
MCV RBC AUTO: 89 FL (ref 82–98)
MONOCYTES # BLD AUTO: 0.36 THOUSAND/ÂΜL (ref 0.17–1.22)
MONOCYTES NFR BLD AUTO: 7 % (ref 4–12)
NEUTROPHILS # BLD AUTO: 2.57 THOUSANDS/ÂΜL (ref 1.85–7.62)
NEUTS SEG NFR BLD AUTO: 50 % (ref 43–75)
NRBC BLD AUTO-RTO: 0 /100 WBCS
P AXIS: 38 DEGREES
PLATELET # BLD AUTO: 237 THOUSANDS/UL (ref 149–390)
PMV BLD AUTO: 10.3 FL (ref 8.9–12.7)
POTASSIUM SERPL-SCNC: 4.6 MMOL/L (ref 3.5–5.3)
PR INTERVAL: 164 MS
PROT SERPL-MCNC: 7.9 G/DL (ref 6.4–8.4)
QRS AXIS: -25 DEGREES
QRSD INTERVAL: 84 MS
QT INTERVAL: 382 MS
QTC INTERVAL: 429 MS
RBC # BLD AUTO: 4.21 MILLION/UL (ref 3.81–5.12)
SODIUM SERPL-SCNC: 139 MMOL/L (ref 135–147)
T WAVE AXIS: 33 DEGREES
VENTRICULAR RATE: 76 BPM
WBC # BLD AUTO: 5.19 THOUSAND/UL (ref 4.31–10.16)

## 2024-07-10 PROCEDURE — 99285 EMERGENCY DEPT VISIT HI MDM: CPT

## 2024-07-10 PROCEDURE — 80053 COMPREHEN METABOLIC PANEL: CPT | Performed by: EMERGENCY MEDICINE

## 2024-07-10 PROCEDURE — 71046 X-RAY EXAM CHEST 2 VIEWS: CPT

## 2024-07-10 PROCEDURE — 84484 ASSAY OF TROPONIN QUANT: CPT | Performed by: EMERGENCY MEDICINE

## 2024-07-10 PROCEDURE — 93010 ELECTROCARDIOGRAM REPORT: CPT | Performed by: INTERNAL MEDICINE

## 2024-07-10 PROCEDURE — 93005 ELECTROCARDIOGRAM TRACING: CPT

## 2024-07-10 PROCEDURE — 85025 COMPLETE CBC W/AUTO DIFF WBC: CPT | Performed by: EMERGENCY MEDICINE

## 2024-07-10 PROCEDURE — 36415 COLL VENOUS BLD VENIPUNCTURE: CPT

## 2024-07-10 NOTE — ED PROVIDER NOTES
History  Chief Complaint   Patient presents with    Chest Pain     Pt has been having chest pain/ pressure that started a few days ago. Today pt started feeling tightness and dizziness. +sob     53-year-old female with history of depression, obstructive sleep apnea, GERD, treated H. pylori infection presents to the emergency department for chest pain.  Patient states over the last several months has had intermittent squeezing tightness in the left chest with occasional radiation to the left arm.  She states it has been accompanied for the past 2 days by a burning epigastric pain.  This morning patient developed lightheadedness which prompted her to come to the emergency department, although she states that it is a similar sensation as when she started to have a panic attack.  Patient previously prescribed PPI however has not been taking prescribed PPI and has switched to an all natural reflux pill as needed.  Patient states she has been under a lot of stress recently, has been having poor water intake.  Denies shortness of breath, fever, chills, nausea, vomiting, diarrhea, constipation, rash, swelling of her legs, back pain      Chest Pain  Associated symptoms: abdominal pain (Epigastric)    Associated symptoms: no back pain, no cough, no fever, no headache, no nausea, no palpitations, no shortness of breath and not vomiting        Prior to Admission Medications   Prescriptions Last Dose Informant Patient Reported? Taking?   Cannabidiol 100 MG/ML SOLN  Self Yes No   Sig: Take by mouth daily as needed   MAGNESIUM PO  Self Yes No   Sig: Take by mouth   fluticasone (FLONASE) 50 mcg/act nasal spray  Self No No   Si sprays into each nostril daily   losartan-hydrochlorothiazide (HYZAAR) 50-12.5 mg per tablet   No No   Sig: TAKE 1 TABLET BY MOUTH DAILY      Facility-Administered Medications: None       Past Medical History:   Diagnosis Date    Anemia     Last Assessed: 2013    Concussion     Eczema      Hyperlipidemia     Hypertension     Reactive airway disease     Sleep apnea        Past Surgical History:   Procedure Laterality Date    CARDIAC CATHETERIZATION N/A 8/26/2022    Procedure: Cardiac catheterization;  Surgeon: Esteban Hernández MD;  Location: [facility];  Service: Cardiology    CARDIAC CATHETERIZATION N/A 8/26/2022    Procedure: Cardiac Coronary Angiogram;  Surgeon: Esteban Hernández MD;  Location: [facility];  Service: Cardiology    TOOTH EXTRACTION      UPPER GASTROINTESTINAL ENDOSCOPY         Family History   Problem Relation Age of Onset    Anemia Father     Hypertension Father     Stroke Father         stroke syndrome    Dementia Father     No Known Problems Sister     Diabetes Maternal Grandmother     Dementia Maternal Grandmother     Heart attack Cousin     No Known Problems Half-Sister     No Known Problems Maternal Aunt     Colon cancer Paternal Aunt     No Known Problems Paternal Aunt     No Known Problems Paternal Aunt      I have reviewed and agree with the history as documented.    E-Cigarette/Vaping    E-Cigarette Use Never User      E-Cigarette/Vaping Substances    CBD Yes      Social History     Tobacco Use    Smoking status: Never     Passive exposure: Never    Smokeless tobacco: Never   Vaping Use    Vaping status: Never Used   Substance Use Topics    Alcohol use: No     Comment: Per [employer]: socially    Drug use: No        Review of Systems   Constitutional:  Negative for chills and fever.   Respiratory:  Negative for cough and shortness of breath.    Cardiovascular:  Positive for chest pain. Negative for palpitations and leg swelling.   Gastrointestinal:  Positive for abdominal pain (Epigastric). Negative for constipation, diarrhea, nausea and vomiting.   Musculoskeletal:  Negative for back pain.   Skin:  Negative for rash.   Neurological:  Negative for headaches.       Physical Exam  ED Triage Vitals [07/10/24 1218]   Temperature Pulse Respirations Blood Pressure  SpO2   97.8 °F (36.6 °C) 79 20 136/84 99 %      Temp Source Heart Rate Source Patient Position - Orthostatic VS BP Location FiO2 (%)   Temporal Monitor -- Left arm --      Pain Score       7             Orthostatic Vital Signs  Vitals:    07/10/24 1218   BP: 136/84   Pulse: 79       Physical Exam  Vitals and nursing note reviewed.   Constitutional:       General: She is not in acute distress.     Appearance: Normal appearance. She is not ill-appearing.   HENT:      Head: Normocephalic and atraumatic.      Mouth/Throat:      Mouth: Mucous membranes are moist.   Eyes:      Conjunctiva/sclera: Conjunctivae normal.   Cardiovascular:      Rate and Rhythm: Normal rate and regular rhythm.      Heart sounds: No murmur heard.     No friction rub. No gallop.   Pulmonary:      Effort: Pulmonary effort is normal. No respiratory distress.      Breath sounds: Normal breath sounds. No wheezing, rhonchi or rales.   Abdominal:      General: There is no distension.      Palpations: Abdomen is soft.      Tenderness: There is no abdominal tenderness. There is no guarding or rebound.   Musculoskeletal:         General: Normal range of motion.   Skin:     General: Skin is dry.   Neurological:      General: No focal deficit present.      Mental Status: She is alert.   Psychiatric:         Mood and Affect: Mood normal.         Behavior: Behavior normal.         ED Medications  Medications - No data to display    Diagnostic Studies  Results Reviewed       Procedure Component Value Units Date/Time    HS Troponin I 4hr [[id number]]     Lab Status: No result Specimen: Blood     HS Troponin I 2hr [[id number]]     Lab Status: No result Specimen: Blood     HS Troponin 0hr (reflex protocol) [[id number]]  (Normal) Collected: 07/10/24 1226    Lab Status: Final result Specimen: Blood from Arm, Left Updated: 07/10/24 1300     hs TnI 0hr 3 ng/L     Comprehensive metabolic panel [[id number]] Collected: 07/10/24 1226    Lab Status: Final result Specimen:  Blood from Arm, Left Updated: 07/10/24 1256     Sodium 139 mmol/L      Potassium 4.6 mmol/L      Chloride 104 mmol/L      CO2 27 mmol/L      ANION GAP 8 mmol/L      BUN 11 mg/dL      Creatinine 0.81 mg/dL      Glucose 102 mg/dL      Calcium 9.7 mg/dL      AST 39 U/L      ALT 37 U/L      Alkaline Phosphatase 61 U/L      Total Protein 7.9 g/dL      Albumin 4.4 g/dL      Total Bilirubin 0.75 mg/dL      eGFR 83 ml/min/1.73sq m     Narrative:      National Kidney Disease Foundation guidelines for Chronic Kidney Disease (CKD):     Stage 1 with normal or high GFR (GFR > 90 mL/min/1.73 square meters)    Stage 2 Mild CKD (GFR = 60-89 mL/min/1.73 square meters)    Stage 3A Moderate CKD (GFR = 45-59 mL/min/1.73 square meters)    Stage 3B Moderate CKD (GFR = 30-44 mL/min/1.73 square meters)    Stage 4 Severe CKD (GFR = 15-29 mL/min/1.73 square meters)    Stage 5 End Stage CKD (GFR <15 mL/min/1.73 square meters)  Note: GFR calculation is accurate only with a steady state creatinine    CBC and differential [[id number]] Collected: 07/10/24 1226    Lab Status: Final result Specimen: Blood from Arm, Left Updated: 07/10/24 1242     WBC 5.19 Thousand/uL      RBC 4.21 Million/uL      Hemoglobin 12.2 g/dL      Hematocrit 37.3 %      MCV 89 fL      MCH 29.0 pg      MCHC 32.7 g/dL      RDW 12.6 %      MPV 10.3 fL      Platelets 237 Thousands/uL      nRBC 0 /100 WBCs      Segmented % 50 %      Immature Grans % 0 %      Lymphocytes % 42 %      Monocytes % 7 %      Eosinophils Relative 1 %      Basophils Relative 0 %      Absolute Neutrophils 2.57 Thousands/µL      Absolute Immature Grans 0.02 Thousand/uL      Absolute Lymphocytes 2.17 Thousands/µL      Absolute Monocytes 0.36 Thousand/µL      Eosinophils Absolute 0.06 Thousand/µL      Basophils Absolute 0.01 Thousands/µL                    XR chest pa & lateral    (Results Pending)         Procedures  Procedures      ED Course  ED Course as of 07/10/24 1441   Wed Jul 10, 2024   1333 EKG  "showing normal sinus rhythm at 76 bpm.  Left axis deviation.  Normal intervals.  Nonspecific ST changes.   1334 hs TnI 0hr: 3   1334 HS Troponin 0hr (reflex protocol)  Troponin negative             HEART Risk Score      Flowsheet Row Most Recent Value   Heart Score Risk Calculator    History 0 Filed at: 07/10/2024 1438   ECG 0 Filed at: 07/10/2024 1438   Age 1 Filed at: 07/10/2024 1438   Risk Factors 1 Filed at: 07/10/2024 1438   Troponin 0 Filed at: 07/10/2024 1438   HEART Score 2 Filed at: 07/10/2024 1438                                  Medical Decision Making  Patient is a 53 y.o. female with PMH of GERD, H. pylori, MARKUS, depression who presents to the ED with chest pain.    Vital signs stable. On exam patient with normal cardiac, pulmonary, abdominal physical exam..    History and physical exam most consistent with atypical chest pain. However, differential diagnosis included but not limited to ACS, arrhythmia, panic attack, GERD.     Plan: CBC, BMP, troponin, EKG, chest x-ray    View ED course above for further discussion on patient workup.     All labs reviewed and utilized in the medical decision making process  All radiology studies independently viewed by me and interpreted by the radiologist.  I reviewed all testing with the patient.     Upon re-evaluation patient feeling much improved. Heart score 2     Disposition: Discharge    Portions of the record may have been created with voice recognition software. Occasional wrong word or \"sound a like\" substitutions may have occurred due to the inherent limitations of voice recognition software. Read the chart carefully and recognize, using context, where substitutions have occurred.      Amount and/or Complexity of Data Reviewed  Labs: ordered. Decision-making details documented in ED Course.          Disposition  Final diagnoses:   Atypical chest pain     Time reflects when diagnosis was documented in both MDM as applicable and the Disposition within this note  "      Time User Action Codes Description Comment    7/10/2024  2:39 PM Paresh Winkler Add [R07.89] Atypical chest pain           ED Disposition       ED Disposition   Discharge    Condition   Stable    Date/Time   Wed Jul 10, 2024 1439    Comment   Reetha L Dubinkin discharge to home/self care.                   Follow-up Information       Follow up With Specialties Details Why Contact Info    [facility]  Please follow-up with your PCP for further evaluation of this chest pain and your reflux symptoms. [facility] [facility] [address]  [phone]              Patient's Medications   Discharge Prescriptions    No medications on file     No discharge procedures on file.    PDMP Review       None             ED Provider  Attending physically available and evaluated Tangantony L Dubinkin. I managed the patient along with the ED Attending.    Electronically Signed by           Paresh Winkler DO  07/10/24 2619

## 2024-07-10 NOTE — DISCHARGE INSTRUCTIONS
You were seen and evaluated in the emergency department for chest pain.  Your cardiac enzymes, blood levels, electrolytes, EKG were all reassuring and did not indicate any acute cardiac events.  Please follow-up with your primary care provider for further evaluation of this chest pain and reflux symptoms.  Please return to the emergency department if you experience worsening chest pain, difficulty breathing, fever, diaphoresis.

## 2024-07-13 NOTE — ED ATTENDING ATTESTATION
7/10/2024  I, Jamshid Lopez DO, saw and evaluated the patient. I have discussed the patient with the resident/non-physician practitioner and agree with the resident's/non-physician practitioner's findings, Plan of Care, and MDM as documented in the resident's/non-physician practitioner's note, except where noted. All available labs and Radiology studies were reviewed.  I was present for key portions of any procedure(s) performed by the resident/non-physician practitioner and I was immediately available to provide assistance.       At this point I agree with the current assessment done in the Emergency Department.  I have conducted an independent evaluation of this patient a history and physical is as follows:    53-year-old female with chest pain.  Similar to previous episodes of chest pain.  Starts in the center feels tight.  Gets worse the more she thinks about it.  Normal exam right now.  Plan cardiac workup rule out ACS EKG interpreted me as normal sinus rhythm differential includes GERD anxiety    ED Course         Critical Care Time  Procedures

## 2024-10-12 ENCOUNTER — HOSPITAL ENCOUNTER (EMERGENCY)
Facility: HOSPITAL | Age: 54
Discharge: HOME | End: 2024-10-12
Attending: EMERGENCY MEDICINE | Admitting: EMERGENCY MEDICINE
Payer: COMMERCIAL

## 2024-10-12 VITALS
DIASTOLIC BLOOD PRESSURE: 66 MMHG | RESPIRATION RATE: 18 BRPM | SYSTOLIC BLOOD PRESSURE: 114 MMHG | HEART RATE: 85 BPM | TEMPERATURE: 97.6 F | OXYGEN SATURATION: 98 %

## 2024-10-12 DIAGNOSIS — M79.605 LEFT LEG PAIN: Primary | ICD-10-CM

## 2024-10-12 LAB
ANION GAP SERPL CALCULATED.3IONS-SCNC: 7 MMOL/L (ref 4–13)
BUN SERPL-MCNC: 13 MG/DL (ref 5–25)
CALCIUM SERPL-MCNC: 9.6 MG/DL (ref 8.4–10.2)
CHLORIDE SERPL-SCNC: 101 MMOL/L (ref 96–108)
CO2 SERPL-SCNC: 28 MMOL/L (ref 21–32)
CREAT SERPL-MCNC: 0.81 MG/DL (ref 0.6–1.3)
D DIMER PPP FEU-MCNC: 1.6 UG/ML FEU
GFR SERPL CREATININE-BSD FRML MDRD: 83 ML/MIN/1.73SQ M
GLUCOSE SERPL-MCNC: 95 MG/DL (ref 65–140)
PLATELET # BLD AUTO: 236 THOUSANDS/UL (ref 149–390)
PMV BLD AUTO: 9.8 FL (ref 8.9–12.7)
POTASSIUM SERPL-SCNC: 4.1 MMOL/L (ref 3.5–5.3)
SODIUM SERPL-SCNC: 136 MMOL/L (ref 135–147)

## 2024-10-12 PROCEDURE — 85379 FIBRIN DEGRADATION QUANT: CPT | Performed by: STUDENT IN AN ORGANIZED HEALTH CARE EDUCATION/TRAINING PROGRAM

## 2024-10-12 PROCEDURE — 99284 EMERGENCY DEPT VISIT MOD MDM: CPT | Performed by: EMERGENCY MEDICINE

## 2024-10-12 PROCEDURE — 99283 EMERGENCY DEPT VISIT LOW MDM: CPT

## 2024-10-12 PROCEDURE — 36415 COLL VENOUS BLD VENIPUNCTURE: CPT | Performed by: STUDENT IN AN ORGANIZED HEALTH CARE EDUCATION/TRAINING PROGRAM

## 2024-10-12 PROCEDURE — 80048 BASIC METABOLIC PNL TOTAL CA: CPT | Performed by: STUDENT IN AN ORGANIZED HEALTH CARE EDUCATION/TRAINING PROGRAM

## 2024-10-12 PROCEDURE — 85049 AUTOMATED PLATELET COUNT: CPT | Performed by: STUDENT IN AN ORGANIZED HEALTH CARE EDUCATION/TRAINING PROGRAM

## 2024-10-12 PROCEDURE — 96372 THER/PROPH/DIAG INJ SC/IM: CPT

## 2024-10-12 RX ORDER — IBUPROFEN 400 MG/1
400 TABLET, FILM COATED ORAL ONCE
Status: COMPLETED | OUTPATIENT
Start: 2024-10-12 | End: 2024-10-12

## 2024-10-12 RX ORDER — ACETAMINOPHEN 325 MG/1
650 TABLET ORAL ONCE
Status: COMPLETED | OUTPATIENT
Start: 2024-10-12 | End: 2024-10-12

## 2024-10-12 RX ORDER — ENOXAPARIN SODIUM 150 MG/ML
1 INJECTION SUBCUTANEOUS ONCE
Status: COMPLETED | OUTPATIENT
Start: 2024-10-12 | End: 2024-10-12

## 2024-10-12 RX ADMIN — ACETAMINOPHEN 650 MG: 325 TABLET ORAL at 19:25

## 2024-10-12 RX ADMIN — ENOXAPARIN SODIUM 135 MG: 150 INJECTION SUBCUTANEOUS at 22:06

## 2024-10-12 RX ADMIN — IBUPROFEN 400 MG: 400 TABLET, FILM COATED ORAL at 19:25

## 2024-10-12 NOTE — DISCHARGE INSTRUCTIONS
Please return to the ER tomorrow morning @ 0700 so you can get an ultrasound of your legs to look for deep vein thrombosis    Continue taking your medications as prescribed    Please return to the ER if you experience shortness of breath, unilateral leg swelling, or any other concerning symptoms.

## 2024-10-12 NOTE — ED ATTENDING ATTESTATION
10/12/2024  I, Johnny Singleton DO, saw and evaluated the patient. I have discussed the patient with the resident/non-physician practitioner and agree with the resident's/non-physician practitioner's findings, Plan of Care, and MDM as documented in the resident's/non-physician practitioner's note, except where noted. All available labs and Radiology studies were reviewed.  I was present for key portions of any procedure(s) performed by the resident/non-physician practitioner and I was immediately available to provide assistance.       At this point I agree with the current assessment done in the Emergency Department.  I have conducted an independent evaluation of this patient a history and physical is as follows:    Patient is a 34-year-old female with a history of hypertension, hyperlipidemia, increased BMI, prediabetes, presents due to leg cramping.  She says that about a week and a half ago she flew to [place], while there walked around a lot, and then flew back this past Monday, 5 days ago.  No fall, no trauma but over the last several days she has no some cramping in the backs of her calves, behind her knees and occasionally in the back of her thighs.  No numbness, no tingling, no weakness.  No fever, no chills.  No chest pain, no shortness of breath.  No personal or family history of DVT or PE.  No history of cancer.    General:  Patient is well-appearing  Head:  Atraumatic  Eyes:  Conjunctiva pink  ENT:  Mucous membranes are moist  Neck:  Supple  Cardiac:  S1-S2, without murmurs  Lungs:  Clear to auscultation bilaterally  Abdomen:  Soft, nontender, normal bowel sounds, no CVA tenderness, no tympany, no rigidity, no guarding  Extremities: Patient's bilateral lower extremities are visibly atraumatic, there is no calf asymmetry or thigh asymmetry, she has no swelling or warmth at the bilateral hips, knees, ankles.  DP and PT pulses are intact, no bony tenderness to either leg.  Normal sensation throughout both  legs.  Strength is 5 out of 5 in the bilateral hips, knees, ankles.  Neurologic:  Awake, fluent speech, normal comprehension. AAOx3.   Skin:  Pink warm and dry  Psychiatric:  Alert, pleasant, cooperative          ED Course     Labs Reviewed   D-DIMER, QUANTITATIVE - Abnormal       Result Value Ref Range Status    D-Dimer, Quant 1.60 (*) <0.50 ug/ml FEU Final    Comment: Reference and upper limits to exclude DVT and PE are the same.  Do not use to exclude if clinical symptoms are present.  Pregnant women:  1st trimester:  <0.22 - 1.06 ug/ml FEU  2nd trimester:  <0.22 - 1.88 ug/ml FEU  3rd trimester:   0.24 - 3.28 ug/ml FEU    Note: Normal ranges may not apply to patients who are transgender, non-binary, or whose legal sex, sex at birth, and gender identity differ.      Narrative:     In the evaluation for possible pulmonary embolism, in the appropriate (Well's Score of 4 or less) patient, the age adjusted d-dimer cutoff for this patient can be calculated as:    Age x 0.01 (in ug/mL) for Age-adjusted D-dimer exclusion threshold for a patient over 50 years.   PLATELET COUNT - Normal    Platelets 236  149 - 390 Thousands/uL Final    MPV 9.8  8.9 - 12.7 fL Final   BASIC METABOLIC PANEL    Sodium 136  135 - 147 mmol/L Final    Potassium 4.1  3.5 - 5.3 mmol/L Final    Chloride 101  96 - 108 mmol/L Final    CO2 28  21 - 32 mmol/L Final    ANION GAP 7  4 - 13 mmol/L Final    BUN 13  5 - 25 mg/dL Final    Creatinine 0.81  0.60 - 1.30 mg/dL Final    Comment: Standardized to IDMS reference method    Glucose 95  65 - 140 mg/dL Final    Comment: If the patient is fasting, the ADA then defines impaired fasting glucose as > 100 mg/dL and diabetes as > or equal to 123 mg/dL.    Calcium 9.6  8.4 - 10.2 mg/dL Final    eGFR 83  ml/min/1.73sq m Final    Narrative:     National Kidney Disease Foundation guidelines for Chronic Kidney Disease (CKD):     Stage 1 with normal or high GFR (GFR > 90 mL/min/1.73 square meters)    Stage 2 Mild  CKD (GFR = 60-89 mL/min/1.73 square meters)    Stage 3A Moderate CKD (GFR = 45-59 mL/min/1.73 square meters)    Stage 3B Moderate CKD (GFR = 30-44 mL/min/1.73 square meters)    Stage 4 Severe CKD (GFR = 15-29 mL/min/1.73 square meters)    Stage 5 End Stage CKD (GFR <15 mL/min/1.73 square meters)  Note: GFR calculation is accurate only with a steady state creatinine     Bilateral DVT or even unilateral DVT a possibility, although less likely.  No sign of cellulitis, no sign of compartment syndrome.  No sign of life-threatening metabolic abnormality.  Her D-dimer is elevated, and at this time I am unable to obtain a venous Doppler in the emergency department.  Call is for patient to be administered a dose of Lovenox anticoagulation, and to return in the morning for a venous Doppler.  Venous Doppler was ordered in the computer system, patient given a dose of Lovenox.  Her platelet count is normal, she had a normal CBC on July 10, 2024, do not believe this needs to be repeated and I do not believe that there are contraindications to Lovenox.  Patient is comfortable with return in the morning for venous Doppler.    DIAGNOSIS:  Acute leg cramping, rule out DVT    MEDICAL DECISION MAKING CODING    COLLECTION AND INTERPRETATION OF DATA  I reviewed prior external notes, including October 20, 2023 feeling medicine office visit    I ordered each unique test  Tests reviewed personally by me:  Labs: See above            Critical Care Time  Procedures

## 2024-10-12 NOTE — ED PROVIDER NOTES
Time reflects when diagnosis was documented in both MDM as applicable and the Disposition within this note       Time User Action Codes Description Comment    10/12/2024  7:27 PM Corincheo Omar Add [M79.605] Left leg pain           ED Disposition       ED Disposition   Discharge    Condition   Stable    Date/Time   Sat Oct 12, 2024  8:59 PM    Comment   Reetha L Dubinkin discharge to home/self care.                   Assessment & Plan       Medical Decision Making  Low suspicion for DVT, however pt concerned so will order dimer. BMP to assess for poss electrolyte abnormalities. Symptomatic relief with tylenol/motrin. If dimer elevated, will order duplex and pt will return in the am.     D-dimer elevated, vascular US tech unavailable. Will give dose of therapeutic lovenox and pt will return tomorrow morning for the duplex. Care plan discussed, pt understands and is agreeable. She will return for the ultrasound, strict return precautions discussed. Pt stable for discharge.         Amount and/or Complexity of Data Reviewed  Labs: ordered.    Risk  OTC drugs.  Prescription drug management.             Medications   enoxaparin (LOVENOX) subcutaneous injection 135 mg (has no administration in time range)   acetaminophen (TYLENOL) tablet 650 mg (650 mg Oral Given 10/12/24 1925)   ibuprofen (MOTRIN) tablet 400 mg (400 mg Oral Given 10/12/24 1925)       ED Risk Strat Scores                                 Wells' Criteria for DVT      Flowsheet Row Most Recent Value   Wells' Criteria for DVT    Active cancer Treatment or palliation within 6 months 0 Filed at: 10/12/2024 1925   Bedridden recently >3 days or major surgery within 12 weeks 0 Filed at: 10/12/2024 1925   Calf swelling >3 cm compared to the other leg 0 Filed at: 10/12/2024 1925   Entire leg swollen 0 Filed at: 10/12/2024 1925   Collateral (nonvaricose) superficial veins present 0 Filed at: 10/12/2024 1925   Localized tenderness along the deep venous system 0 Filed  at: 10/12/2024 1925   Pitting edema, confined to symptomatic leg 0 Filed at: 10/12/2024 1925   Paralysis, paresis, or recent plaster immobilization of the lower extremity 0 Filed at: 10/12/2024 1925   Previously documented DVT 0 Filed at: 10/12/2024 1925   Alternative diagnosis to DVT as likely or more likely 2 Filed at: 10/12/2024 1925   Wells DVT Critera Score -2 Filed at: 10/12/2024 1925                      History of Present Illness       Chief Complaint   Patient presents with    Leg Pain     Recent traveled long distances via plane, pt c/o b/l leg cramping and pain. Would like to be checked for blood clots.        Past Medical History:   Diagnosis Date    Anemia     Last Assessed: 18 June 2013    Concussion     Eczema     Hyperlipidemia     Hypertension     Reactive airway disease     Sleep apnea       Past Surgical History:   Procedure Laterality Date    CARDIAC CATHETERIZATION N/A 8/26/2022    Procedure: Cardiac catheterization;  Surgeon: Esteban Hernández MD;  Location: [facility];  Service: Cardiology    CARDIAC CATHETERIZATION N/A 8/26/2022    Procedure: Cardiac Coronary Angiogram;  Surgeon: Esteban Hernández MD;  Location: [facility];  Service: Cardiology    TOOTH EXTRACTION      UPPER GASTROINTESTINAL ENDOSCOPY        Family History   Problem Relation Age of Onset    Anemia Father     Hypertension Father     Stroke Father         stroke syndrome    Dementia Father     No Known Problems Sister     Diabetes Maternal Grandmother     Dementia Maternal Grandmother     Heart attack Cousin     No Known Problems Half-Sister     No Known Problems Maternal Aunt     Colon cancer Paternal Aunt     No Known Problems Paternal Aunt     No Known Problems Paternal Aunt       Social History     Tobacco Use    Smoking status: Never     Passive exposure: Never    Smokeless tobacco: Never   Vaping Use    Vaping status: Never Used   Substance Use Topics    Alcohol use: No     Comment: Per [employer]: socially     Drug use: No      E-Cigarette/Vaping    E-Cigarette Use Never User       E-Cigarette/Vaping Substances    CBD Yes       I have reviewed and agree with the history as documented.     53 yo F w/PMHx DM, HTN, HLD, venous stasis, presents for evaluation of cramping in her left knee that began last night and progressed throughout today.  Patient recently traveled to [place], and returned Monday.  Patient is concerned she may have a blood clot.  No history of blood clots, no family history of clotting disorder.  Patient denies trauma, fever/chills, shortness of breath, chest pain, unilateral leg swelling.  Patient is experiences cramping in the past but those symptoms reported to resolve so she came in today for evaluation            Review of Systems        Objective       ED Triage Vitals [10/12/24 1837]   Temperature Pulse Blood Pressure Respirations SpO2 Patient Position - Orthostatic VS   97.6 °F (36.4 °C) 85 140/87 18 98 % --      Temp Source Heart Rate Source BP Location FiO2 (%) Pain Score    Temporal -- -- -- 3      Vitals      Date and Time Temp Pulse SpO2 Resp BP Pain Score FACES Pain Rating User   10/12/24 1837 97.6 °F (36.4 °C) 85 98 % 18 140/87 3 -- HK            Physical Exam  Vitals reviewed.   Constitutional:       Appearance: Normal appearance. She is obese.   HENT:      Head: Normocephalic and atraumatic.      Right Ear: External ear normal.      Left Ear: External ear normal.      Nose: Nose normal.      Mouth/Throat:      Mouth: Mucous membranes are moist.      Pharynx: Oropharynx is clear.   Eyes:      Extraocular Movements: Extraocular movements intact.      Conjunctiva/sclera: Conjunctivae normal.      Pupils: Pupils are equal, round, and reactive to light.   Cardiovascular:      Rate and Rhythm: Normal rate and regular rhythm.   Musculoskeletal:      Cervical back: Normal range of motion.   Neurological:      Mental Status: She is alert.         Results Reviewed       Procedure Component Value  Units Date/Time    Platelet count [[id number]]     Lab Status: No result Specimen: Blood     D-dimer, quantitative [[id number]]  (Abnormal) Collected: 10/12/24 1957    Lab Status: Final result Specimen: Blood from Arm, Right Updated: 10/12/24 2022     D-Dimer, Quant 1.60 ug/ml FEU     Narrative:      In the evaluation for possible pulmonary embolism, in the appropriate (Well's Score of 4 or less) patient, the age adjusted d-dimer cutoff for this patient can be calculated as:    Age x 0.01 (in ug/mL) for Age-adjusted D-dimer exclusion threshold for a patient over 50 years.    Basic metabolic panel [[id number]] Collected: 10/12/24 1931    Lab Status: Final result Specimen: Blood from Arm, Left Updated: 10/12/24 2000     Sodium 136 mmol/L      Potassium 4.1 mmol/L      Chloride 101 mmol/L      CO2 28 mmol/L      ANION GAP 7 mmol/L      BUN 13 mg/dL      Creatinine 0.81 mg/dL      Glucose 95 mg/dL      Calcium 9.6 mg/dL      eGFR 83 ml/min/1.73sq m     Narrative:      National Kidney Disease Foundation guidelines for Chronic Kidney Disease (CKD):     Stage 1 with normal or high GFR (GFR > 90 mL/min/1.73 square meters)    Stage 2 Mild CKD (GFR = 60-89 mL/min/1.73 square meters)    Stage 3A Moderate CKD (GFR = 45-59 mL/min/1.73 square meters)    Stage 3B Moderate CKD (GFR = 30-44 mL/min/1.73 square meters)    Stage 4 Severe CKD (GFR = 15-29 mL/min/1.73 square meters)    Stage 5 End Stage CKD (GFR <15 mL/min/1.73 square meters)  Note: GFR calculation is accurate only with a steady state creatinine             VAS VENOUS DUPLEX - LOWER LIMB BILATERAL    (Results Pending)       Procedures    ED Medication and Procedure Management   Prior to Admission Medications   Prescriptions Last Dose Informant Patient Reported? Taking?   Cannabidiol 100 MG/ML SOLN  Self Yes No   Sig: Take by mouth daily as needed   MAGNESIUM PO  Self Yes No   Sig: Take by mouth   fluticasone (FLONASE) 50 mcg/act nasal spray  Self No No   Si sprays into  each nostril daily   losartan-hydrochlorothiazide (HYZAAR) 50-12.5 mg per tablet   No No   Sig: TAKE 1 TABLET BY MOUTH DAILY      Facility-Administered Medications: None     Patient's Medications   Discharge Prescriptions    No medications on file     No discharge procedures on file.  ED SEPSIS DOCUMENTATION   Time reflects when diagnosis was documented in both MDM as applicable and the Disposition within this note       Time User Action Codes Description Comment    10/12/2024  7:27 PM Omar Reyes Add [M79.605] Left leg pain                  Omar Reyes MD  10/12/24 9122

## 2024-10-13 ENCOUNTER — HOSPITAL ENCOUNTER (EMERGENCY)
Facility: HOSPITAL | Age: 54
Discharge: HOME | End: 2024-10-13
Attending: EMERGENCY MEDICINE
Payer: COMMERCIAL

## 2024-10-13 VITALS
DIASTOLIC BLOOD PRESSURE: 78 MMHG | SYSTOLIC BLOOD PRESSURE: 142 MMHG | OXYGEN SATURATION: 99 % | HEART RATE: 78 BPM | TEMPERATURE: 97.6 F | RESPIRATION RATE: 18 BRPM

## 2024-10-13 DIAGNOSIS — R79.89 POSITIVE D-DIMER: ICD-10-CM

## 2024-10-13 DIAGNOSIS — M79.661 BILATERAL CALF PAIN: Primary | ICD-10-CM

## 2024-10-13 DIAGNOSIS — J06.9 URI (UPPER RESPIRATORY INFECTION): ICD-10-CM

## 2024-10-13 DIAGNOSIS — M79.662 BILATERAL CALF PAIN: Primary | ICD-10-CM

## 2024-10-13 PROCEDURE — 94640 AIRWAY INHALATION TREATMENT: CPT

## 2024-10-13 PROCEDURE — 99282 EMERGENCY DEPT VISIT SF MDM: CPT

## 2024-10-13 PROCEDURE — 99284 EMERGENCY DEPT VISIT MOD MDM: CPT | Performed by: EMERGENCY MEDICINE

## 2024-10-13 RX ORDER — IPRATROPIUM BROMIDE AND ALBUTEROL SULFATE 2.5; .5 MG/3ML; MG/3ML
3 SOLUTION RESPIRATORY (INHALATION) ONCE
Status: COMPLETED | OUTPATIENT
Start: 2024-10-13 | End: 2024-10-13

## 2024-10-13 RX ORDER — CETIRIZINE HYDROCHLORIDE 10 MG/1
10 TABLET ORAL DAILY
Qty: 5 TABLET | Refills: 0 | Status: SHIPPED | OUTPATIENT
Start: 2024-10-13 | End: 2024-10-18

## 2024-10-13 RX ORDER — LORATADINE 10 MG/1
5 TABLET ORAL DAILY
Status: DISCONTINUED | OUTPATIENT
Start: 2024-10-13 | End: 2024-10-13 | Stop reason: HOSPADM

## 2024-10-13 RX ORDER — PREDNISONE 20 MG/1
40 TABLET ORAL ONCE
Status: COMPLETED | OUTPATIENT
Start: 2024-10-13 | End: 2024-10-13

## 2024-10-13 RX ORDER — PREDNISONE 20 MG/1
40 TABLET ORAL DAILY
Qty: 10 TABLET | Refills: 0 | Status: SHIPPED | OUTPATIENT
Start: 2024-10-13 | End: 2024-10-18

## 2024-10-13 RX ADMIN — IPRATROPIUM BROMIDE AND ALBUTEROL SULFATE 3 ML: 2.5; .5 SOLUTION RESPIRATORY (INHALATION) at 08:39

## 2024-10-13 RX ADMIN — LORATADINE 5 MG: 10 TABLET ORAL at 08:39

## 2024-10-13 RX ADMIN — PREDNISONE 40 MG: 20 TABLET ORAL at 08:38

## 2024-10-13 NOTE — ED PROVIDER NOTES
Time reflects when diagnosis was documented in both MDM as applicable and the Disposition within this note       Time User Action Codes Description Comment    10/13/2024  9:39 AM Paresh Winkler [M79.661,  M79.662] Bilateral calf pain     10/13/2024  9:39 AM Paresh Winkler [J06.9] URI (upper respiratory infection)     10/13/2024  9:39 AM Paresh Winkler [R79.89] Positive D-dimer           ED Disposition       ED Disposition   Discharge    Condition   Stable    Date/Time   Sun Oct 13, 2024  9:38 AM    Comment   Reetha L Dubinkin discharge to home/self care.                   Assessment & Plan       Medical Decision Making  Patient is a 54 y.o. female with PMH of depression, obstructive sleep apnea, GERD who presents to the ED with concern for DVT, cough congestion, positive D-dimer during yesterday ED visit.    Vital signs stable, no hypoxia. On exam no calf swelling, calf tenderness, calf erythema.  Mild scattered wheezing.    History and physical exam most consistent with upper respiratory infection. However, differential diagnosis included but not limited to DVT, musculoskeletal calf pain, ligamentous injury, muscular injury.     Plan: Bilateral venous duplex lower extremities.  Symptomatic treatment with loratadine, DuoNeb, prednisone    View ED course above for further discussion on patient workup.     On review of previous records reviewed patient's previous ED visit yesterday, had positive D-dimer after patient's concern for DVT.  Patient was instructed to return to emergency department this morning for venous duplex.    All labs reviewed and utilized in the medical decision making process  All radiology studies independently viewed by me and interpreted by the radiologist.  I reviewed all testing with the patient.     Upon re-evaluation patient declining to stay for venous duplex scan.  Patient feels clinically improved with breathing treatment, loratadine.    Disposition:  "Discharge.  Patient given prescription/referral for outpatient venous duplex    Portions of the record may have been created with voice recognition software. Occasional wrong word or \"sound a like\" substitutions may have occurred due to the inherent limitations of voice recognition software. Read the chart carefully and recognize, using context, where substitutions have occurred.      Risk  OTC drugs.  Prescription drug management.        ED Course as of 10/13/24 1644   Sun Oct 13, 2024   0934 Patient feeling much improved, however does not want to wait for vascular bilateral lower extremity duplex scan.  Patient states she must leave the department to take care of animals at home.  Patient counseled on medical recommendation to stay for duplex ultrasound.  Risks and benefits of ultrasound discussed.  Patient declines to stay for ultrasound and is able to communicate back the risks and benefits, however declines anyways.  Patient stable at this time for discharge.   0936 Patient prescription for outpatient bilateral venous duplex ultrasound.       Medications   predniSONE tablet 40 mg (40 mg Oral Given 10/13/24 0838)   ipratropium-albuterol (DUO-NEB) 0.5-2.5 mg/3 mL inhalation solution 3 mL (3 mL Nebulization Given 10/13/24 0839)       ED Risk Strat Scores                                               History of Present Illness       Chief Complaint   Patient presents with    Leg Pain     Pt was here yesterday for b/l leg pain and requiring duplex     Cold Like Symptoms     Since Wednesday having cold like symptoms and yesterday developed cough        Past Medical History:   Diagnosis Date    Anemia     Last Assessed: 18 June 2013    Concussion     Eczema     Hyperlipidemia     Hypertension     Reactive airway disease     Sleep apnea       Past Surgical History:   Procedure Laterality Date    CARDIAC CATHETERIZATION N/A 8/26/2022    Procedure: Cardiac catheterization;  Surgeon: Esteban Hernández MD;  Location: [facility]" CARDIAC CATH LAB;  Service: Cardiology    CARDIAC CATHETERIZATION N/A 8/26/2022    Procedure: Cardiac Coronary Angiogram;  Surgeon: Esteban Hernández MD;  Location: [facility] CARDIAC CATH LAB;  Service: Cardiology    TOOTH EXTRACTION      UPPER GASTROINTESTINAL ENDOSCOPY        Family History   Problem Relation Age of Onset    Anemia Father     Hypertension Father     Stroke Father         stroke syndrome    Dementia Father     No Known Problems Sister     Diabetes Maternal Grandmother     Dementia Maternal Grandmother     Heart attack Cousin     No Known Problems Half-Sister     No Known Problems Maternal Aunt     Colon cancer Paternal Aunt     No Known Problems Paternal Aunt     No Known Problems Paternal Aunt       Social History     Tobacco Use    Smoking status: Never     Passive exposure: Never    Smokeless tobacco: Never   Vaping Use    Vaping status: Never Used   Substance Use Topics    Alcohol use: No     Comment: Per [employer]: socially    Drug use: No      E-Cigarette/Vaping    E-Cigarette Use Never User       E-Cigarette/Vaping Substances    CBD Yes       I have reviewed and agree with the history as documented.     54-year-old female with prior medical history of depression, obstructive sleep apnea, GERD presenting to emergency department for concern for DVT as well as cough and congestion.  Patient evaluated 10/12 for bilateral calf pain, had elevated D-dimer.  During that evaluation vascular ultrasound technician was not available, so patient was given injection of subcutaneous heparin, and told to come back to emergency department this morning at 7 AM for a venous duplex study.  Patient reports recent prolonged plane trip on 10/7 and 10/10 to [place] includes the [facility] and [place].  Patient notes she has chronic bilateral calf pain, however she says this is slightly worse and more prolonged.  Denies fever, Swelling or erythema, nausea, vomiting, shortness of breath, chest pain, hemoptysis, tachycardia.    Patient also reports  cough, congestion over the past 3 days.  Patient had negative at-home COVID test.      Leg Pain  Associated symptoms: no fever        Review of Systems   Constitutional:  Negative for chills and fever.   HENT:  Positive for congestion. Negative for ear pain, rhinorrhea and sore throat.    Respiratory:  Positive for cough. Negative for shortness of breath.    Cardiovascular:  Negative for chest pain, palpitations and leg swelling.   Gastrointestinal:  Negative for abdominal pain, constipation, diarrhea, nausea and vomiting.   Musculoskeletal:         Positive for bilateral calf pain.  Negative for bilateral calf swelling or calf redness.   Skin:  Negative for rash.   Neurological:  Negative for light-headedness and headaches.   All other systems reviewed and are negative.          Objective       ED Triage Vitals [10/13/24 0708]   Temperature Pulse Blood Pressure Respirations SpO2 Patient Position - Orthostatic VS   97.6 °F (36.4 °C) 78 142/78 18 99 % --      Temp Source Heart Rate Source BP Location FiO2 (%) Pain Score    Temporal Monitor Left arm -- No Pain      Vitals      Date and Time Temp Pulse SpO2 Resp BP Pain Score FACES Pain Rating User   10/13/24 0708 97.6 °F (36.4 °C) 78 99 % 18 142/78 No Pain -- MO            Physical Exam  Vitals and nursing note reviewed.   Constitutional:       General: She is not in acute distress.     Appearance: Normal appearance. She is not ill-appearing.   HENT:      Head: Normocephalic and atraumatic.      Mouth/Throat:      Mouth: Mucous membranes are moist.   Eyes:      Conjunctiva/sclera: Conjunctivae normal.   Cardiovascular:      Rate and Rhythm: Normal rate and regular rhythm.      Heart sounds: No murmur heard.     No friction rub. No gallop.   Pulmonary:      Effort: Pulmonary effort is normal. No respiratory distress.      Breath sounds: Normal breath sounds. No wheezing, rhonchi or rales.   Abdominal:      General: There is no distension.      Palpations: Abdomen is  soft.      Tenderness: There is no abdominal tenderness. There is no guarding or rebound.   Musculoskeletal:         General: Normal range of motion.      Comments: Negative for calf tenderness bilaterally, no calf swelling, no calf erythema.   Skin:     General: Skin is dry.   Neurological:      General: No focal deficit present.      Mental Status: She is alert.   Psychiatric:         Mood and Affect: Mood normal.         Behavior: Behavior normal.         Results Reviewed       None             VAS VENOUS DUPLEX - LOWER LIMB BILATERAL    (Results Pending)       Procedures    ED Medication and Procedure Management   Prior to Admission Medications   Prescriptions Last Dose Informant Patient Reported? Taking?   Cannabidiol 100 MG/ML SOLN  Self Yes No   Sig: Take by mouth daily as needed   MAGNESIUM PO  Self Yes No   Sig: Take by mouth   fluticasone (FLONASE) 50 mcg/act nasal spray  Self No No   Si sprays into each nostril daily   losartan-hydrochlorothiazide (HYZAAR) 50-12.5 mg per tablet   No No   Sig: TAKE 1 TABLET BY MOUTH DAILY      Facility-Administered Medications: None     Discharge Medication List as of 10/13/2024  9:43 AM        START taking these medications    Details   cetirizine (ZyrTEC) 10 mg tablet Take 1 tablet (10 mg total) by mouth daily for 5 days, Starting Sun 10/13/2024, Until Fri 10/18/2024, Normal      predniSONE 20 mg tablet Take 2 tablets (40 mg total) by mouth daily for 5 days, Starting Sun 10/13/2024, Until Fri 10/18/2024, Normal           CONTINUE these medications which have NOT CHANGED    Details   Cannabidiol 100 MG/ML SOLN Take by mouth daily as needed, Historical Med      fluticasone (FLONASE) 50 mcg/act nasal spray 2 sprays into each nostril daily, Starting Tue 3/24/2020, Normal      losartan-hydrochlorothiazide (HYZAAR) 50-12.5 mg per tablet TAKE 1 TABLET BY MOUTH DAILY, Starting Wed 3/20/2024, Normal      MAGNESIUM PO Take by mouth, Historical Med           Outpatient  Discharge Orders    VAS VENOUS DUPLEX - LOWER LIMB BILATERAL   Standing Status: Future Standing Exp. Date: 10/13/28     ED SEPSIS DOCUMENTATION   Time reflects when diagnosis was documented in both MDM as applicable and the Disposition within this note       Time User Action Codes Description Comment    10/13/2024  9:39 AM Paresh Winkler [M79.661,  M79.662] Bilateral calf pain     10/13/2024  9:39 AM Paresh Winkler [J06.9] URI (upper respiratory infection)     10/13/2024  9:39 AM Paresh Winkler [R79.89] Positive D-dimer                  Paresh Winkler DO  10/13/24 2742

## 2024-10-13 NOTE — ED ATTENDING ATTESTATION
10/13/2024  I, Dago Noland MD, saw and evaluated the patient. I have discussed the patient with the resident/non-physician practitioner and agree with the resident's/non-physician practitioner's findings, Plan of Care, and MDM as documented in the resident's/non-physician practitioner's note, except where noted. All available labs and Radiology studies were reviewed.  I was present for key portions of any procedure(s) performed by the resident/non-physician practitioner and I was immediately available to provide assistance.       At this point I agree with the current assessment done in the Emergency Department.  I have conducted an independent evaluation of this patient a history and physical is as follows:  Patient was seen for leg swelling in the ER last evening she had an elevated D-dimer was given a shot of Lovenox and told to return to the emergency room for Doppler this morning  Patient has had a upper respiratory infection as well cough with scant yellow sputum no shortness of breath no chest pain no fever no orthopnea no PND  No history of DVT or PE  Will obtain Doppler ultrasound  ED Course         Critical Care Time  Procedures

## 2024-10-13 NOTE — DISCHARGE INSTRUCTIONS
You were seen and evaluated in the emergency department for cough, congestion, bilateral calf pain.  You are found to have upper respiratory infection, and previously had a positive D-dimer.  You were treated with breathing treatment, steroids, loratadine.  A venous duplex ultrasound of the bilateral lower extremities was ordered, however you are declining to stay for this testing.  Risks and benefits of leaving prior to this procedure were explained to you, but you are agreeing to decline to stay for this scan.    You are being given a prescription for prednisone, a steroid, please take 2 tablets/day for 5 days.  You are being given a prescription for Zyrtec, an antihistamine, take 1 tablet daily for 5 days.    You are being given a prescription for a bilateral lower extremity venous ultrasound to evaluate for DVT.  Please follow-up with your with the results of this test.    Please follow-up with your primary care provider for further management of your positive D-dimer, upper respiratory infection,.  Please return to the emergency department if you develop difficulty breathing, persistent fever, chest pain.

## 2024-10-14 ENCOUNTER — HOSPITAL ENCOUNTER (OUTPATIENT)
Dept: NON INVASIVE DIAGNOSTICS | Facility: HOSPITAL | Age: 54
Discharge: HOME | End: 2024-10-14
Payer: COMMERCIAL

## 2024-10-14 DIAGNOSIS — M79.661 BILATERAL CALF PAIN: ICD-10-CM

## 2024-10-14 DIAGNOSIS — M79.662 BILATERAL CALF PAIN: ICD-10-CM

## 2024-10-14 DIAGNOSIS — R79.89 POSITIVE D-DIMER: ICD-10-CM

## 2024-10-14 PROCEDURE — 93970 EXTREMITY STUDY: CPT

## 2024-10-15 PROCEDURE — 93970 EXTREMITY STUDY: CPT | Performed by: SURGERY

## 2025-02-24 DIAGNOSIS — I10 ESSENTIAL HYPERTENSION: ICD-10-CM

## 2025-02-26 DIAGNOSIS — E78.5 HYPERLIPIDEMIA, UNSPECIFIED HYPERLIPIDEMIA TYPE: Chronic | ICD-10-CM

## 2025-02-26 DIAGNOSIS — I10 ESSENTIAL HYPERTENSION: Primary | Chronic | ICD-10-CM

## 2025-02-26 DIAGNOSIS — R73.03 PRE-DIABETES: ICD-10-CM

## 2025-02-26 RX ORDER — LOSARTAN POTASSIUM AND HYDROCHLOROTHIAZIDE 12.5; 5 MG/1; MG/1
1 TABLET ORAL DAILY
Qty: 90 TABLET | Refills: 3 | OUTPATIENT
Start: 2025-02-26

## 2025-03-10 ENCOUNTER — OFFICE VISIT (OUTPATIENT)
Dept: FAMILY MEDICINE CLINIC | Facility: CLINIC | Age: 55
End: 2025-03-10
Payer: COMMERCIAL

## 2025-03-10 VITALS
TEMPERATURE: 97.6 F | WEIGHT: 293 LBS | BODY MASS INDEX: 50.02 KG/M2 | HEART RATE: 76 BPM | HEIGHT: 64 IN | RESPIRATION RATE: 18 BRPM | SYSTOLIC BLOOD PRESSURE: 126 MMHG | DIASTOLIC BLOOD PRESSURE: 84 MMHG | OXYGEN SATURATION: 97 %

## 2025-03-10 DIAGNOSIS — E66.01 MORBID OBESITY WITH BMI OF 40.0-44.9, ADULT (HCC): ICD-10-CM

## 2025-03-10 DIAGNOSIS — Z71.85 VACCINE COUNSELING: ICD-10-CM

## 2025-03-10 DIAGNOSIS — G47.33 OSA (OBSTRUCTIVE SLEEP APNEA): Primary | ICD-10-CM

## 2025-03-10 DIAGNOSIS — I10 ESSENTIAL HYPERTENSION: ICD-10-CM

## 2025-03-10 DIAGNOSIS — R73.03 PRE-DIABETES: ICD-10-CM

## 2025-03-10 DIAGNOSIS — K21.9 GASTROESOPHAGEAL REFLUX DISEASE WITHOUT ESOPHAGITIS: ICD-10-CM

## 2025-03-10 DIAGNOSIS — E78.5 HYPERLIPIDEMIA, UNSPECIFIED HYPERLIPIDEMIA TYPE: Chronic | ICD-10-CM

## 2025-03-10 DIAGNOSIS — Z12.31 ENCOUNTER FOR SCREENING MAMMOGRAM FOR MALIGNANT NEOPLASM OF BREAST: ICD-10-CM

## 2025-03-10 DIAGNOSIS — M17.0 PRIMARY OSTEOARTHRITIS OF BOTH KNEES: ICD-10-CM

## 2025-03-10 PROBLEM — R10.32 ACUTE LEFT LOWER QUADRANT PAIN: Status: RESOLVED | Noted: 2023-10-11 | Resolved: 2025-03-10

## 2025-03-10 PROCEDURE — 99396 PREV VISIT EST AGE 40-64: CPT | Performed by: NURSE PRACTITIONER

## 2025-03-10 PROCEDURE — 99214 OFFICE O/P EST MOD 30 MIN: CPT | Performed by: NURSE PRACTITIONER

## 2025-03-10 RX ORDER — ALBUTEROL SULFATE 90 UG/1
1 INHALANT RESPIRATORY (INHALATION)
COMMUNITY
Start: 2025-03-02

## 2025-03-10 RX ORDER — LOSARTAN POTASSIUM AND HYDROCHLOROTHIAZIDE 12.5; 5 MG/1; MG/1
1 TABLET ORAL DAILY
Qty: 90 TABLET | Refills: 3 | Status: SHIPPED | OUTPATIENT
Start: 2025-03-10

## 2025-03-10 RX ORDER — LOSARTAN POTASSIUM AND HYDROCHLOROTHIAZIDE 12.5; 5 MG/1; MG/1
1 TABLET ORAL DAILY
Qty: 90 TABLET | Refills: 3 | Status: SHIPPED | OUTPATIENT
Start: 2025-03-10 | End: 2025-03-10

## 2025-03-10 NOTE — ASSESSMENT & PLAN NOTE
Patient due for updated lipid panel.  She is not on a statin.  She previously had been on Zetia and Lipitor however did stop these due to muscle cramping.  Supplements discussed with the patient at her last visit.  I will order lipid panel for surveillance.

## 2025-03-10 NOTE — ASSESSMENT & PLAN NOTE
Patient continues to use her CPAP machine.  She does have an older machine but did receive a replacement.  She continues to use the older recalled machine as she has supplies leftover and does not want to waste them.  She needs to follow-up with sleep medicine.  Referral placed.  Orders:    Ambulatory Referral to Sleep Medicine; Future

## 2025-03-10 NOTE — ASSESSMENT & PLAN NOTE
Discussed lifestyle changes including diet and exercise.    Diet should include switching from simple carbohydrates like white rice, white pasta, white bread to brown rice, wheat pasta, wheat bread.  Increase exercise to 150 minutes per week, should include cardio and weightlifting.  Patient has gained 6 pounds since her last visit.  Information discussed with the patient regarding healthy eating and exercise.  She states she does walk outside more often in the spring.

## 2025-03-10 NOTE — ASSESSMENT & PLAN NOTE
Information provided to the regarding triggers in her diet.  She no longer is on Protonix.  She occasionally takes Mylanta.  Weight loss and dietary modifications.

## 2025-03-10 NOTE — PROGRESS NOTES
Name: Reetha L Dubinkin      : 1970      MRN: [id number]  Encounter Provider: SARAH Dixon  Encounter Date: 3/10/2025   Encounter department: [facility]  :  Assessment & Plan  Encounter for screening mammogram for malignant neoplasm of breast    Orders:    Mammo screening bilateral w 3d and cad; Future    MARKUS (obstructive sleep apnea)  Patient continues to use her CPAP machine.  She does have an older machine but did receive a replacement.  She continues to use the older recalled machine as she has supplies leftover and does not want to waste them.  She needs to follow-up with sleep medicine.  Referral placed.  Orders:    Ambulatory Referral to Sleep Medicine; Future    Essential hypertension  Blood pressure in the office today is 126/84.  She remains on the Hyzaar 50/12.5 mg daily.  She states she is taking this every other day.  She does monitor her blood pressure at home.  Continue low-salt low-fat diet.  Increase exercise.  Weight loss would help.  Orders:    losartan-hydrochlorothiazide (HYZAAR) 50-12.5 mg per tablet; Take 1 tablet by mouth daily    Gastroesophageal reflux disease without esophagitis  Information provided to the regarding triggers in her diet.  She no longer is on Protonix.  She occasionally takes Mylanta.  Weight loss and dietary modifications.         Primary osteoarthritis of both knees  Patient with a history of arthritis of both knees.  She is morbidly obese.  She had been in physical therapy in the past which did help.  Last x-rays were in 2017.  Currently the pain is stable.  She will contact me should this worsen.         Hyperlipidemia, unspecified hyperlipidemia type  Patient due for updated lipid panel.  She is not on a statin.  She previously had been on Zetia and Lipitor however did stop these due to muscle cramping.  Supplements discussed with the patient at her last visit.  I will order lipid panel for surveillance.         Morbid obesity with  BMI of 40.0-44.9, adult ([facility])  Discussed lifestyle changes including diet and exercise.    Diet should include switching from simple carbohydrates like white rice, white pasta, white bread to brown rice, wheat pasta, wheat bread.  Increase exercise to 150 minutes per week, should include cardio and weightlifting.  Patient has gained 6 pounds since her last visit.  Information discussed with the patient regarding healthy eating and exercise.  She states she does walk outside more often in the spring.         Pre-diabetes  Last A1c 6.1.  Hemoglobin A1c ordered for surveillance.         Vaccine counseling  Discussed with patient the need for an updated measles vaccine.  The patient has received her vaccines in childhood.  We do not live currently in a high outbreak area.  I did make her aware that currently the CDC is not recommending revaccination.  If this guidelines change she can certainly call the office and we can revaccinate.             Depression Screening and Follow-up Plan: Patient was screened for depression during today's encounter. They screened negative with a PHQ-2 score of 1.        History of Present Illness   Patient presents to the office today for annual physical and follow-up.  Patient was treated through [facility] last week for pneumonia.  She did finish her antibiotic.  She continues on her inhaler as needed.  Her symptoms have resolved.  She continues with symptoms of acid reflux and is taking over-the-counter medications as needed.  She is up-to-date with Pap smears with her last 1 being in 2023.  She is due for an annual exam with her gynecologist and will make that appointment.She did have pelvic floor therapy in the past for urge incontinence which did help.  She has having some pain with intercourse and her gynecologist did instruct her to use coconut oil.  I did discuss a referral to one of our menopause specialist however the patient has declined this.  Discussed with patient and  "updated measles vaccine.              Review of Systems   Constitutional:  Negative for activity change, fatigue and fever.   HENT:  Negative for congestion, hearing loss, rhinorrhea, trouble swallowing and voice change.    Eyes:  Negative for photophobia, pain, discharge and visual disturbance.   Respiratory:  Negative for cough, chest tightness and shortness of breath.    Cardiovascular:  Negative for chest pain, palpitations and leg swelling.   Gastrointestinal:  Negative for abdominal pain, blood in stool, constipation, nausea and vomiting.   Endocrine: Negative for cold intolerance and heat intolerance.   Genitourinary:  Negative for difficulty urinating, frequency, hematuria, urgency, vaginal bleeding and vaginal discharge.   Musculoskeletal:  Positive for arthralgias. Negative for myalgias.   Skin: Negative.    Neurological:  Negative for dizziness, weakness, numbness and headaches.   Psychiatric/Behavioral:  Negative for decreased concentration. The patient is not nervous/anxious.        Objective   /84   Pulse 76   Temp 97.6 °F (36.4 °C) (Tympanic)   Resp 18   Ht 5' 4\" (1.626 m)   Wt (!) 137 kg (302 lb 9.6 oz)   LMP 08/01/2021 (Approximate)   SpO2 97%   BMI 51.94 kg/m²      Physical Exam  Vitals and nursing note reviewed.   Constitutional:       General: She is not in acute distress.     Appearance: Normal appearance. She is obese.   HENT:      Head: Normocephalic and atraumatic.      Right Ear: Tympanic membrane, ear canal and external ear normal. There is no impacted cerumen.      Left Ear: Tympanic membrane, ear canal and external ear normal. There is no impacted cerumen.      Nose: Nose normal.      Mouth/Throat:      Mouth: Mucous membranes are moist.      Pharynx: Oropharynx is clear. No posterior oropharyngeal erythema.   Eyes:      General:         Right eye: No discharge.         Left eye: No discharge.      Extraocular Movements: Extraocular movements intact.      Pupils: Pupils are " equal, round, and reactive to light.   Cardiovascular:      Rate and Rhythm: Normal rate and regular rhythm.      Pulses: Normal pulses.      Heart sounds: Normal heart sounds. No murmur heard.  Pulmonary:      Effort: Pulmonary effort is normal.      Breath sounds: Normal breath sounds.   Abdominal:      General: Bowel sounds are normal.      Palpations: Abdomen is soft.   Musculoskeletal:         General: Normal range of motion.      Cervical back: Normal range of motion.   Skin:     General: Skin is warm and dry.      Capillary Refill: Capillary refill takes less than 2 seconds.   Neurological:      General: No focal deficit present.      Mental Status: She is alert and oriented to person, place, and time. Mental status is at baseline.   Psychiatric:         Mood and Affect: Mood normal.         Behavior: Behavior normal.         Thought Content: Thought content normal.         Judgment: Judgment normal.

## 2025-03-10 NOTE — ASSESSMENT & PLAN NOTE
Blood pressure in the office today is 126/84.  She remains on the Hyzaar 50/12.5 mg daily.  She states she is taking this every other day.  She does monitor her blood pressure at home.  Continue low-salt low-fat diet.  Increase exercise.  Weight loss would help.  Orders:    losartan-hydrochlorothiazide (HYZAAR) 50-12.5 mg per tablet; Take 1 tablet by mouth daily

## 2025-03-10 NOTE — PATIENT INSTRUCTIONS
"Patient Education     Acid reflux and GERD in adults   The Basics   Written by the doctors and editors at [facility]   What is acid reflux? -- Acid reflux is when the acid that is normally in the stomach backs up into the esophagus (figure 1). The esophagus is the tube that carries food from the mouth to the stomach.  When acid reflux causes bothersome symptoms or damage, doctors call it \"gastroesophageal reflux disease\" (\"GERD\").  What are the symptoms of acid reflux? -- The most common symptoms are:   Heartburn, which is a burning feeling in the chest   Regurgitation, which is when acid and undigested food flow back into your throat or mouth  Other symptoms might include:   Stomach or chest pain   Trouble swallowing   Raspy voice or sore throat   Unexplained cough   Nausea or vomiting  Is there anything I can do on my own to feel better? -- Yes. You might feel better if you:   Lose weight (if you are overweight).   Raise the head of your bed by 6 to 8 inches - You can do this by putting blocks of wood or rubber under 2 legs of the bed or a foam wedge under the mattress. It is not enough to sleep with your head raised on pillows.   Avoid foods that make your symptoms worse - For some people, these include coffee, chocolate, alcohol, peppermint, and fatty foods. It might help to write down what you ate before having reflux. This can help you figure out if a food is causing your problem.   Stop smoking, if you smoke. Your doctor or nurse can help you try to quit.   Avoid late meals - Lying down with a full stomach can make reflux worse. Try to plan meals for at least 2 to 3 hours before bedtime.   Avoid tight clothing - Some people feel better if they wear comfortable clothing that does not squeeze the stomach area.  How is acid reflux treated? -- There are a few main types of medicines that can help with the symptoms of acid reflux. The most common are antacids, histamine blockers, and proton pump inhibitors (table " "1). All of these medicines work by reducing or blocking stomach acid. But they each do that in a different way.   For mild symptoms, antacids can help, but they work only for a short time. Histamine blockers are stronger and last longer than antacids. You can buy antacids and most histamine blockers without a prescription.   For frequent and more severe symptoms, proton pump inhibitors are the most effective medicines. Some of these medicines are sold without a prescription. But there are other versions that your doctor can prescribe.  Sometimes, medicines cost less if you get them with a doctor's prescription. Other times, non-prescription medicines cost less. If you are worried about cost, ask your pharmacist about ways to pay less for your medicines.  When should I call the doctor? -- While pain or burning in the chest can be a symptom of acid reflux, it can also be a symptom of something more serious like a heart problem. Call for emergency help right away (in the [place] and [place], call 9-1-1) if you think that you might be having a heart attack.  Symptoms of a heart attack might include:   Severe chest pain, pressure, or discomfort with:   Breathing trouble, sweating, upset stomach, or cold and clammy skin   Pain in your arms, back, or jaw   Worse pain with activity like walking up stairs   Fast or irregular heartbeat   Feeling dizzy, faint, or weak  Some people can manage their acid reflux on their own by changing their habits or taking non-prescription medicines. Call your doctor for advice if:   Your symptoms are severe or last a long time.   You cannot seem to control your symptoms.   You have had symptoms for many years.  You should also see a doctor or nurse right away if you:   Have trouble swallowing, or feel as though food gets \"stuck\" on the way down   Lose weight when you are not trying to   Have chest pain   Choke when you eat   Vomit blood, or have bowel movements that are red, black, or look like " "tar  What if my child or teen has acid reflux? -- If your child or teen has acid reflux, take them to see a doctor or nurse. Do not give your child medicines to treat acid reflux without talking to a doctor or nurse.  In children, acid reflux can be caused by a number of problems. Have a doctor or nurse check for these problems before trying any treatments.  All topics are updated as new evidence becomes available and our peer review process is complete.  This topic retrieved from [employer] on: Mar 29, 2024.  Topic 50828 Version 15.0  Release: 32.2.4 - C32.87  © 2024 UpToDate, Inc. and/or its affiliates. All rights reserved.  figure 1: Acid reflux     Acid reflux is when the acid that is normally in the stomach backs up into the esophagus. This can happen if a muscle called the \"lower esophageal sphincter\" relaxes too much.  Graphic 083717 Version 1.0  table 1: Medicines that reduce or block stomach acid  Medicine type  Medicine name examples    Antacids* Calcium carbonate (sample brand names: Maalox, Tums)    Aluminum hydroxide, magnesium hydroxide, and simethicone (sample brand name: Mylanta)   Surface agents Sucralfate (brand name: Carafate)   Histamine blockers¶  Famotidine (brand name: Pepcid)    Cimetidine (brand name: Tagamet)   Proton pump inhibitors Omeprazole (brand name: Prilosec)    Esomeprazole (brand name: Nexium)    Pantoprazole (brand name: Protonix)    Lansoprazole (brand name: Prevacid)    Dexlansoprazole (brand name: Dexilant)    Rabeprazole (brand name: AcipHex)   * Some antacids contain aspirin, which can increase the risk of internal bleeding. Examples of antacids with aspirin include [place], Medi-[place], and Neutralin. But there are others, too, so it's important to check labels. Talk to your doctor or nurse before taking any medicines that contain aspirin.  ¶ Another histamine blocker, ranitidine (brand name: Zantac), stopped being sold in 2020. That's because it was found to sometimes " contain a substance that could increase a person's risk of cancer if they took a lot of it over time. If you have any of this medicine in your home, stop taking it and throw away any that is left over. Ask your doctor or nurse about what other medicine you should use instead.  Graphic 76564 Version 15.0  Consumer Information Use and Disclaimer   Disclaimer: This generalized information is a limited summary of diagnosis, treatment, and/or medication information. It is not meant to be comprehensive and should be used as a tool to help the user understand and/or assess potential diagnostic and treatment options. It does NOT include all information about conditions, treatments, medications, side effects, or risks that may apply to a specific patient. It is not intended to be medical advice or a substitute for the medical advice, diagnosis, or treatment of a health care provider based on the health care provider's examination and assessment of a patient's specific and unique circumstances. Patients must speak with a health care provider for complete information about their health, medical questions, and treatment options, including any risks or benefits regarding use of medications. This information does not endorse any treatments or medications as safe, effective, or approved for treating a specific patient. UpToDate, Inc. and its affiliates disclaim any warranty or liability relating to this information or the use thereof.The use of this information is governed by the Terms of Use, available at https://www.woltersAudematuwer.com/en/know/clinical-effectiveness-terms. 2024© UpToDate, Inc. and its affiliates and/or licensors. All rights reserved.  Copyright   © 2024 UpToDate, Inc. and/or its affiliates. All rights reserved.

## 2025-03-10 NOTE — ASSESSMENT & PLAN NOTE
Patient with a history of arthritis of both knees.  She is morbidly obese.  She had been in physical therapy in the past which did help.  Last x-rays were in 2017.  Currently the pain is stable.  She will contact me should this worsen.

## 2025-03-11 ENCOUNTER — TELEPHONE (OUTPATIENT)
Dept: ADMINISTRATIVE | Facility: OTHER | Age: 55
End: 2025-03-11

## 2025-03-11 DIAGNOSIS — Z12.4 SCREENING FOR CERVICAL CANCER: ICD-10-CM

## 2025-03-11 NOTE — TELEPHONE ENCOUNTER
----- Message from Angela HEWITT sent at 3/10/2025  9:57 AM EDT -----  Regarding: care gap request  03/10/25 9:57 AM    Hello, our patient attached above has had Pap Smear (HPV) aka Cervical Cancer Screening completed/performed. Please assist in updating the patient chart by pulling the document from Lab Tab within Chart Review. The date of service is 11/09/2023.     Thank you,  Angela Mcginnis  [facility]

## 2025-03-11 NOTE — TELEPHONE ENCOUNTER
Upon review of the In Basket request we were able to locate, review, and update the patient chart as requested for Pap Smear (HPV) aka Cervical Cancer Screening.    Any additional questions or concerns should be emailed to the Practice Liaisons via the appropriate education email address, please do not reply via In Basket.    Thank you  Naomie Calvillo MA   [facility]

## 2025-04-21 ENCOUNTER — HOSPITAL ENCOUNTER (OUTPATIENT)
Dept: RADIOLOGY | Age: 55
Discharge: HOME | End: 2025-04-21
Payer: COMMERCIAL

## 2025-04-21 VITALS — BODY MASS INDEX: 50.02 KG/M2 | HEIGHT: 64 IN | WEIGHT: 293 LBS

## 2025-04-21 DIAGNOSIS — Z12.31 ENCOUNTER FOR SCREENING MAMMOGRAM FOR MALIGNANT NEOPLASM OF BREAST: ICD-10-CM

## 2025-04-21 PROCEDURE — 77067 SCR MAMMO BI INCL CAD: CPT

## 2025-04-21 PROCEDURE — 77063 BREAST TOMOSYNTHESIS BI: CPT

## 2025-04-28 ENCOUNTER — RESULTS FOLLOW-UP (OUTPATIENT)
Dept: FAMILY MEDICINE CLINIC | Facility: CLINIC | Age: 55
End: 2025-04-28

## 2025-05-28 ENCOUNTER — APPOINTMENT (EMERGENCY)
Dept: RADIOLOGY | Facility: HOSPITAL | Age: 55
End: 2025-05-28
Payer: COMMERCIAL

## 2025-05-28 ENCOUNTER — HOSPITAL ENCOUNTER (EMERGENCY)
Facility: HOSPITAL | Age: 55
Discharge: HOME | End: 2025-05-28
Attending: EMERGENCY MEDICINE | Admitting: EMERGENCY MEDICINE
Payer: COMMERCIAL

## 2025-05-28 VITALS
SYSTOLIC BLOOD PRESSURE: 128 MMHG | DIASTOLIC BLOOD PRESSURE: 71 MMHG | OXYGEN SATURATION: 99 % | TEMPERATURE: 97.8 F | HEART RATE: 81 BPM | RESPIRATION RATE: 18 BRPM

## 2025-05-28 DIAGNOSIS — R06.02 SHORTNESS OF BREATH: Primary | ICD-10-CM

## 2025-05-28 LAB
ALBUMIN SERPL BCG-MCNC: 4.6 G/DL (ref 3.5–5)
ALP SERPL-CCNC: 59 U/L (ref 34–104)
ALT SERPL W P-5'-P-CCNC: 39 U/L (ref 7–52)
ANION GAP SERPL CALCULATED.3IONS-SCNC: 7 MMOL/L (ref 4–13)
AST SERPL W P-5'-P-CCNC: 30 U/L (ref 13–39)
ATRIAL RATE: 81 BPM
BASOPHILS # BLD AUTO: 0.02 THOUSANDS/ÂΜL (ref 0–0.1)
BASOPHILS NFR BLD AUTO: 0 % (ref 0–1)
BILIRUB SERPL-MCNC: 0.65 MG/DL (ref 0.2–1)
BUN SERPL-MCNC: 14 MG/DL (ref 5–25)
CALCIUM SERPL-MCNC: 9.8 MG/DL (ref 8.4–10.2)
CARDIAC TROPONIN I PNL SERPL HS: 3 NG/L (ref ?–50)
CHLORIDE SERPL-SCNC: 101 MMOL/L (ref 96–108)
CO2 SERPL-SCNC: 30 MMOL/L (ref 21–32)
CREAT SERPL-MCNC: 0.67 MG/DL (ref 0.6–1.3)
D DIMER PPP FEU-MCNC: 0.86 UG/ML FEU
EOSINOPHIL # BLD AUTO: 0.07 THOUSAND/ÂΜL (ref 0–0.61)
EOSINOPHIL NFR BLD AUTO: 1 % (ref 0–6)
ERYTHROCYTE [DISTWIDTH] IN BLOOD BY AUTOMATED COUNT: 12.5 % (ref 11.6–15.1)
GFR SERPL CREATININE-BSD FRML MDRD: 100 ML/MIN/1.73SQ M
GLUCOSE SERPL-MCNC: 104 MG/DL (ref 65–140)
HCT VFR BLD AUTO: 37.7 % (ref 34.8–46.1)
HGB BLD-MCNC: 12.1 G/DL (ref 11.5–15.4)
IMM GRANULOCYTES # BLD AUTO: 0.01 THOUSAND/UL (ref 0–0.2)
IMM GRANULOCYTES NFR BLD AUTO: 0 % (ref 0–2)
LYMPHOCYTES # BLD AUTO: 2.29 THOUSANDS/ÂΜL (ref 0.6–4.47)
LYMPHOCYTES NFR BLD AUTO: 39 % (ref 14–44)
MCH RBC QN AUTO: 29.1 PG (ref 26.8–34.3)
MCHC RBC AUTO-ENTMCNC: 32.1 G/DL (ref 31.4–37.4)
MCV RBC AUTO: 91 FL (ref 82–98)
MONOCYTES # BLD AUTO: 0.43 THOUSAND/ÂΜL (ref 0.17–1.22)
MONOCYTES NFR BLD AUTO: 7 % (ref 4–12)
NEUTROPHILS # BLD AUTO: 3.13 THOUSANDS/ÂΜL (ref 1.85–7.62)
NEUTS SEG NFR BLD AUTO: 53 % (ref 43–75)
NRBC BLD AUTO-RTO: 0 /100 WBCS
P AXIS: 61 DEGREES
PLATELET # BLD AUTO: 194 THOUSANDS/UL (ref 149–390)
PMV BLD AUTO: 10.3 FL (ref 8.9–12.7)
POTASSIUM SERPL-SCNC: 4.1 MMOL/L (ref 3.5–5.3)
PR INTERVAL: 174 MS
PROT SERPL-MCNC: 7.7 G/DL (ref 6.4–8.4)
QRS AXIS: 46 DEGREES
QRSD INTERVAL: 72 MS
QT INTERVAL: 370 MS
QTC INTERVAL: 430 MS
RBC # BLD AUTO: 4.16 MILLION/UL (ref 3.81–5.12)
SODIUM SERPL-SCNC: 138 MMOL/L (ref 135–147)
T WAVE AXIS: 5 DEGREES
VENTRICULAR RATE: 81 BPM
WBC # BLD AUTO: 5.95 THOUSAND/UL (ref 4.31–10.16)

## 2025-05-28 PROCEDURE — 93010 ELECTROCARDIOGRAM REPORT: CPT | Performed by: INTERNAL MEDICINE

## 2025-05-28 PROCEDURE — 36415 COLL VENOUS BLD VENIPUNCTURE: CPT

## 2025-05-28 PROCEDURE — 71275 CT ANGIOGRAPHY CHEST: CPT

## 2025-05-28 PROCEDURE — 71046 X-RAY EXAM CHEST 2 VIEWS: CPT

## 2025-05-28 PROCEDURE — 80053 COMPREHEN METABOLIC PANEL: CPT

## 2025-05-28 PROCEDURE — 99285 EMERGENCY DEPT VISIT HI MDM: CPT | Performed by: EMERGENCY MEDICINE

## 2025-05-28 PROCEDURE — 85379 FIBRIN DEGRADATION QUANT: CPT

## 2025-05-28 PROCEDURE — 84484 ASSAY OF TROPONIN QUANT: CPT

## 2025-05-28 PROCEDURE — 93005 ELECTROCARDIOGRAM TRACING: CPT

## 2025-05-28 PROCEDURE — 99285 EMERGENCY DEPT VISIT HI MDM: CPT

## 2025-05-28 PROCEDURE — 85025 COMPLETE CBC W/AUTO DIFF WBC: CPT

## 2025-05-28 RX ADMIN — IOHEXOL 85 ML: 350 INJECTION, SOLUTION INTRAVENOUS at 14:34

## 2025-05-28 NOTE — ED ATTENDING ATTESTATION
5/28/2025  I, Dago Noland MD, saw and evaluated the patient. I have discussed the patient with the resident/non-physician practitioner and agree with the resident's/non-physician practitioner's findings, Plan of Care, and MDM as documented in the resident's/non-physician practitioner's note, except where noted. All available labs and Radiology studies were reviewed.  I was present for key portions of any procedure(s) performed by the resident/non-physician practitioner and I was immediately available to provide assistance.       At this point I agree with the current assessment done in the Emergency Department.  I have conducted an independent evaluation of this patient a history and physical is as follows:  Sob  for a month   occ chest pain   no radiation   Had  cardiac cath less than 3 years ago demonstrating  normal coronaries  No leg pain or leg swelling  Patient has a history of elevated BMI and obstructive sleep apnea  EXAM:   Const:   well appearing   NAD     HEENT:  NCAT    sclera anicteric conjunctiva pink   throat clear, MMM    Neck:   supple  no meningismus  no jvd   no bruits  no  midline tenderness   Lungs:   clear  CW non-tender   No creiptation  Heart:   RRR no m/g/r  Normal pulses  Abd:   soft nt nd pos bs   Ext:    normal nontender  No edema  Neruo:   CN 2 -12 intact  motor intact 5/5 sensory intact cerebellar intact       Gait normal    IMPRESSION: Chest pain/shortness of breath  PLAN: Cardiac workup D-dimer    ED Course         Critical Care Time  Procedures

## 2025-05-28 NOTE — DISCHARGE INSTRUCTIONS
IMPRESSION:     1.  No central or proximal segmental pulmonary embolus.  2.  No acute thoracic abnormalities.  3.  3 mm right lower lobe pulmonary nodule. Based on current Fleischner Society 2017 Guidelines on incidental pulmonary nodule, no routine follow-up is needed if the patient is low risk. If the patient is high risk, optional follow-up chest CT at 12   months can be considered.  4.  Computerized Assisted Algorithm (CAA) may have aided analysis of applicable images.     Workstation performed: [id number]    Workup was negative for pulmonary embolism and ACS.  Follow-up with your PCP.  Return the emergency department if your symptoms do not improve or worsen.

## 2025-05-28 NOTE — INCIDENTAL FINDINGS
The following findings require follow up:  Radiographic finding   Finding:       IMPRESSION:     1.  No central or proximal segmental pulmonary embolus.  2.  No acute thoracic abnormalities.  3.  3 mm right lower lobe pulmonary nodule. Based on current Fleischner Society 2017 Guidelines on incidental pulmonary nodule, no routine follow-up is needed if the patient is low risk. If the patient is high risk, optional follow-up chest CT at 12   months can be considered.  4.  Computerized Assisted Algorithm (CAA) may have aided analysis of applicable images.     Workstation performed: [id number]          Follow up required: yes    Follow up should be done within 1 year    Please notify the following clinician to assist with the follow up:   PCP    Incidental finding results were discussed with the Patient by Fito Brumfield DO on 05/28/25.   They expressed understanding and all questions answered.

## 2025-05-28 NOTE — ED PROVIDER NOTES
Time reflects when diagnosis was documented in both MDM as applicable and the Disposition within this note       Time User Action Codes Description Comment    5/28/2025  3:27 PM Fito Brumfield Add [R06.02] Shortness of breath           ED Disposition       ED Disposition   Discharge    Condition   Stable    Date/Time   Wed May 28, 2025  3:27 PM    Comment   Reetha L Dubinkin discharge to home/self care.                   Assessment & Plan       Medical Decision Making  Will rule out pulmonary embolism and ACS.  D-dimer is positive.  Will elect to perform CT scan.  No pulmonary embolism however pulmonary nodule detected.  Patient informed of all test results including pulmonary nodule requiring follow-up in 1 year.  She verbalized understanding.  Work note provided and stable for discharge home.    Disposition: Discharged with instructions to obtain outpatient follow up of patient's symptoms and findings, with strict return precautions if patient develops new or worsening symptoms. Patient understands this plan and is agreeable. All questions answered. Patient discharged home with return precautions.      Amount and/or Complexity of Data Reviewed  Labs: ordered.  Radiology: ordered.    Risk  Prescription drug management.        ED Course as of 05/28/25 1544   Wed May 28, 2025   1143 Blood Pressure: 128/71   1143 Temperature: 97.8 °F (36.6 °C)   1143 Temp Source: Oral   1143 Pulse: 81   1143 Respirations: 18   1143 SpO2: 99 %   1313 Patient resting comfortably in room.  Updated of test results.  Will need PE scan.       Medications   iohexol (OMNIPAQUE) 350 MG/ML injection (MULTI-DOSE) 85 mL (85 mL Intravenous Given 5/28/25 1434)       ED Risk Strat Scores                    No data recorded        SBIRT 20yo+      Flowsheet Row Most Recent Value   Initial Alcohol Screen: US AUDIT-C     1. How often do you have a drink containing alcohol? 0 Filed at: 05/28/2025 1121   2. How many drinks containing alcohol do you have  on a typical day you are drinking?  0 Filed at: 05/28/2025 1121   3b. FEMALE Any Age, or MALE 65+: How often do you have 4 or more drinks on one occassion? 0 Filed at: 05/28/2025 1121   Audit-C Score 0 Filed at: 05/28/2025 1121   FRITZ: How many times in the past year have you...    Used an illegal drug or used a prescription medication for non-medical reasons? Never Filed at: 05/28/2025 1121                            History of Present Illness       Chief Complaint   Patient presents with    Chest Pain     Pt complains of chest pressure starting aprox a month ago. States worsening the last couple days. Pt complains of BL leg swelling and sharp pains and lethargy.        Past Medical History[1]   Past Surgical History[2]   Family History[3]   Social History[4]   E-Cigarette/Vaping    E-Cigarette Use Never User       E-Cigarette/Vaping Substances    CBD Yes       I have reviewed and agree with the history as documented.     54-year-old female with a past medical history of obstructive sleep apnea presents emergency department complaining of left-sided chest discomfort and shortness of breath.  Occasional radiation of pain.  Notices pain more with movement and deep respirations.  No nausea or vomiting.  No abdominal pain.        Review of Systems   Respiratory:  Positive for shortness of breath.    All other systems reviewed and are negative.          Objective       ED Triage Vitals [05/28/25 1120]   Temperature Pulse Blood Pressure Respirations SpO2 Patient Position - Orthostatic VS   97.8 °F (36.6 °C) 81 128/71 18 99 % Lying      Temp Source Heart Rate Source BP Location FiO2 (%) Pain Score    Oral Monitor Left arm -- 3      Vitals      Date and Time Temp Pulse SpO2 Resp BP Pain Score FACES Pain Rating User   05/28/25 1121 -- -- 99 % -- -- -- -- AS   05/28/25 1120 97.8 °F (36.6 °C) 81 99 % 18 128/71 3 -- AS            Physical Exam  Vitals and nursing note reviewed.   Constitutional:       General: She is not in  acute distress.     Appearance: She is well-developed.   HENT:      Head: Normocephalic and atraumatic.     Eyes:      Conjunctiva/sclera: Conjunctivae normal.       Cardiovascular:      Rate and Rhythm: Normal rate and regular rhythm.      Heart sounds: No murmur heard.  Pulmonary:      Effort: Pulmonary effort is normal. No respiratory distress.      Breath sounds: Normal breath sounds.   Abdominal:      Palpations: Abdomen is soft.      Tenderness: There is no abdominal tenderness.     Musculoskeletal:         General: No swelling.      Cervical back: Neck supple.     Skin:     General: Skin is warm and dry.      Capillary Refill: Capillary refill takes less than 2 seconds.     Neurological:      General: No focal deficit present.      Mental Status: She is alert and oriented to person, place, and time. Mental status is at baseline.      GCS: GCS eye subscore is 4. GCS verbal subscore is 5. GCS motor subscore is 6.      Cranial Nerves: No cranial nerve deficit.      Sensory: No sensory deficit.      Motor: No weakness.      Coordination: Coordination normal.      Gait: Gait normal.     Psychiatric:         Mood and Affect: Mood normal.         Results Reviewed       Procedure Component Value Units Date/Time    D-dimer, quantitative [[id number]]  (Abnormal) Collected: 05/28/25 1123    Lab Status: Final result Specimen: Blood from Arm, Right Updated: 05/28/25 1215     D-Dimer, Quant 0.86 ug/ml FEU     Narrative:      In the evaluation for possible pulmonary embolism, in the appropriate (Well's Score of 4 or less) patient, the age adjusted d-dimer cutoff for this patient can be calculated as:    Age x 0.01 (in ug/mL) for Age-adjusted D-dimer exclusion threshold for a patient over 50 years.    Comprehensive metabolic panel [[id number]] Collected: 05/28/25 1123    Lab Status: Final result Specimen: Blood from Arm, Right Updated: 05/28/25 1154     Sodium 138 mmol/L      Potassium 4.1 mmol/L      Chloride 101 mmol/L       CO2 30 mmol/L      ANION GAP 7 mmol/L      BUN 14 mg/dL      Creatinine 0.67 mg/dL      Glucose 104 mg/dL      Calcium 9.8 mg/dL      AST 30 U/L      ALT 39 U/L      Alkaline Phosphatase 59 U/L      Total Protein 7.7 g/dL      Albumin 4.6 g/dL      Total Bilirubin 0.65 mg/dL      eGFR 100 ml/min/1.73sq m     Narrative:      National Kidney Disease Foundation guidelines for Chronic Kidney Disease (CKD):     Stage 1 with normal or high GFR (GFR > 90 mL/min/1.73 square meters)    Stage 2 Mild CKD (GFR = 60-89 mL/min/1.73 square meters)    Stage 3A Moderate CKD (GFR = 45-59 mL/min/1.73 square meters)    Stage 3B Moderate CKD (GFR = 30-44 mL/min/1.73 square meters)    Stage 4 Severe CKD (GFR = 15-29 mL/min/1.73 square meters)    Stage 5 End Stage CKD (GFR <15 mL/min/1.73 square meters)  Note: GFR calculation is accurate only with a steady state creatinine    HS Troponin 0hr (reflex protocol) [[id number]]  (Normal) Collected: 05/28/25 1123    Lab Status: Final result Specimen: Blood from Arm, Right Updated: 05/28/25 1154     hs TnI 0hr 3 ng/L     CBC and differential [[id number]] Collected: 05/28/25 1123    Lab Status: Final result Specimen: Blood from Arm, Right Updated: 05/28/25 1135     WBC 5.95 Thousand/uL      RBC 4.16 Million/uL      Hemoglobin 12.1 g/dL      Hematocrit 37.7 %      MCV 91 fL      MCH 29.1 pg      MCHC 32.1 g/dL      RDW 12.5 %      MPV 10.3 fL      Platelets 194 Thousands/uL      nRBC 0 /100 WBCs      Segmented % 53 %      Immature Grans % 0 %      Lymphocytes % 39 %      Monocytes % 7 %      Eosinophils Relative 1 %      Basophils Relative 0 %      Absolute Neutrophils 3.13 Thousands/µL      Absolute Immature Grans 0.01 Thousand/uL      Absolute Lymphocytes 2.29 Thousands/µL      Absolute Monocytes 0.43 Thousand/µL      Eosinophils Absolute 0.07 Thousand/µL      Basophils Absolute 0.02 Thousands/µL             CTA chest pe study   Final Interpretation by Jean Pierre Melgoza MD (05/28 6035)      1.  No  central or proximal segmental pulmonary embolus.   2.  No acute thoracic abnormalities.   3.  3 mm right lower lobe pulmonary nodule. Based on current Fleischner Society 2017 Guidelines on incidental pulmonary nodule, no routine follow-up is needed if the patient is low risk. If the patient is high risk, optional follow-up chest CT at 12    months can be considered.   4.   Computerized Assisted Algorithm (CAA) may have aided analysis of applicable images.      Workstation performed: [id number]         XR chest 2 views   Final Interpretation by Bg Loja MD ( 505)      No acute cardiopulmonary disease.            Workstation performed: [id number]             ECG 12 Lead Documentation Only    Date/Time: 2025 11:45 AM    Performed by: Fito Brumfield DO  Authorized by: Fito Brumfield DO    Indications / Diagnosis:  Chest pain  ECG reviewed by me, the ED Provider: yes    Patient location:  ED  Previous ECG:     Previous ECG:  Unavailable    Comparison to cardiac monitor: Yes    Interpretation:     Interpretation: normal    Rate:     ECG rate:  81    ECG rate assessment: normal    Rhythm:     Rhythm: sinus rhythm    Ectopy:     Ectopy: none    QRS:     QRS axis:  Normal    QRS intervals:  Normal  Conduction:     Conduction: normal    ST segments:     ST segments:  Normal  T waves:     T waves: normal        ED Medication and Procedure Management   Prior to Admission Medications   Prescriptions Last Dose Informant Patient Reported? Taking?   Cannabidiol 100 MG/ML SOLN  Self Yes No   Sig: Take by mouth daily as needed   MAGNESIUM PO  Self Yes No   Sig: Take by mouth   albuterol (PROVENTIL HFA,VENTOLIN HFA) 90 mcg/act inhaler   Yes No   Sig: Inhale 1 puff   cetirizine (ZyrTEC) 10 mg tablet   No No   Sig: Take 1 tablet (10 mg total) by mouth daily for 5 days   fluticasone (FLONASE) 50 mcg/act nasal spray  Self No No   Si sprays into each nostril daily   losartan-hydrochlorothiazide (HYZAAR)  50-12.5 mg per tablet   No No   Sig: Take 1 tablet by mouth daily      Facility-Administered Medications: None     Patient's Medications   Discharge Prescriptions    No medications on file     No discharge procedures on file.  ED SEPSIS DOCUMENTATION   Time reflects when diagnosis was documented in both MDM as applicable and the Disposition within this note       Time User Action Codes Description Comment    5/28/2025  3:27 PM Fito Brumfield [R06.02] Shortness of breath                    [1]   Past Medical History:  Diagnosis Date    Acute left lower quadrant pain 10/11/2023    Anemia     Last Assessed: 18 June 2013    Concussion     Eczema     Hyperlipidemia     Hypertension     Reactive airway disease     Sleep apnea    [2]   Past Surgical History:  Procedure Laterality Date    CARDIAC CATHETERIZATION N/A 8/26/2022    Procedure: Cardiac catheterization;  Surgeon: Esteban Hernández MD;  Location: [facility];  Service: Cardiology    CARDIAC CATHETERIZATION N/A 8/26/2022    Procedure: Cardiac Coronary Angiogram;  Surgeon: Esteban Hernández MD;  Location: [facility];  Service: Cardiology    TOOTH EXTRACTION      UPPER GASTROINTESTINAL ENDOSCOPY     [3]   Family History  Problem Relation Name Age of Onset    Anemia Father      Hypertension Father      Stroke Father          stroke syndrome    Dementia Father      Diabetes Maternal Grandmother      Dementia Maternal Grandmother      No Known Problems Maternal Grandfather      No Known Problems Paternal Grandmother      No Known Problems Paternal Grandfather      No Known Problems Maternal Aunt      Colon cancer Paternal Aunt  52    No Known Problems Paternal Aunt      No Known Problems Paternal Aunt      Heart attack Cousin Paternal     No Known Problems Half-Sister      No Known Problems Half-Sister     [4]   Social History  Tobacco Use    Smoking status: Never     Passive exposure: Never    Smokeless tobacco: Never   Vaping Use    Vaping status:  Never Used   Substance Use Topics    Alcohol use: No     Comment: Per [employer]: socially    Drug use: No        Fito Brumfield DO  05/28/25 1542

## 2025-05-28 NOTE — Clinical Note
Reetha Dubinkin was seen and treated in our emergency department on 5/28/2025.            as tolerated    Diagnosis: ER visit    Haroon  may return to school on return date.    She may return on this date: 05/29/2025         If you have any questions or concerns, please don't hesitate to call.      Fito Brumfield, DO    ______________________________           _______________          _______________  Hospital Representative                              Date                                Time

## 2025-08-18 ENCOUNTER — OFFICE VISIT (OUTPATIENT)
Dept: SLEEP CENTER | Facility: CLINIC | Age: 55
End: 2025-08-18
Payer: COMMERCIAL

## 2025-08-18 VITALS
DIASTOLIC BLOOD PRESSURE: 82 MMHG | HEART RATE: 79 BPM | WEIGHT: 292.8 LBS | BODY MASS INDEX: 49.99 KG/M2 | OXYGEN SATURATION: 98 % | SYSTOLIC BLOOD PRESSURE: 130 MMHG | HEIGHT: 64 IN

## 2025-08-18 DIAGNOSIS — F41.9 ANXIETY: ICD-10-CM

## 2025-08-18 DIAGNOSIS — G47.33 OSA (OBSTRUCTIVE SLEEP APNEA): Primary | ICD-10-CM

## 2025-08-18 DIAGNOSIS — I10 ESSENTIAL HYPERTENSION: Chronic | ICD-10-CM

## 2025-08-18 DIAGNOSIS — K21.9 GASTROESOPHAGEAL REFLUX DISEASE WITHOUT ESOPHAGITIS: ICD-10-CM

## 2025-08-18 DIAGNOSIS — E66.01 MORBID OBESITY WITH BODY MASS INDEX (BMI) OF 50.0 TO 59.9 IN ADULT (HCC): ICD-10-CM

## 2025-08-18 DIAGNOSIS — J30.2 SEASONAL ALLERGIES: ICD-10-CM

## 2025-08-18 PROCEDURE — 99204 OFFICE O/P NEW MOD 45 MIN: CPT | Performed by: INTERNAL MEDICINE

## 2025-08-18 RX ORDER — CIPROFLOXACIN 500 MG/1
500 TABLET, FILM COATED ORAL
COMMUNITY
Start: 2025-08-16

## 2025-08-19 ENCOUNTER — NURSE TRIAGE (OUTPATIENT)
Age: 55
End: 2025-08-19

## 2025-08-19 ENCOUNTER — TELEPHONE (OUTPATIENT)
Dept: SLEEP CENTER | Facility: CLINIC | Age: 55
End: 2025-08-19

## 2025-08-19 ENCOUNTER — OFFICE VISIT (OUTPATIENT)
Dept: FAMILY MEDICINE CLINIC | Facility: CLINIC | Age: 55
End: 2025-08-19
Payer: COMMERCIAL

## 2025-08-19 VITALS
HEIGHT: 64 IN | SYSTOLIC BLOOD PRESSURE: 140 MMHG | HEART RATE: 82 BPM | OXYGEN SATURATION: 98 % | BODY MASS INDEX: 50.02 KG/M2 | DIASTOLIC BLOOD PRESSURE: 86 MMHG | TEMPERATURE: 97.9 F | WEIGHT: 293 LBS | RESPIRATION RATE: 18 BRPM

## 2025-08-19 DIAGNOSIS — L30.8 OTHER ECZEMA: Primary | ICD-10-CM

## 2025-08-19 DIAGNOSIS — M54.2 NECK PAIN: ICD-10-CM

## 2025-08-19 DIAGNOSIS — I10 ESSENTIAL HYPERTENSION: Chronic | ICD-10-CM

## 2025-08-19 LAB

## 2025-08-19 PROCEDURE — 99213 OFFICE O/P EST LOW 20 MIN: CPT | Performed by: NURSE PRACTITIONER

## 2025-08-19 RX ORDER — TACROLIMUS 1 MG/G
OINTMENT TOPICAL 2 TIMES DAILY
Qty: 60 G | Refills: 1 | Status: SHIPPED | OUTPATIENT
Start: 2025-08-19

## (undated) DEVICE — GLIDESHEATH SLENDER STAINLESS STEEL KIT: Brand: GLIDESHEATH SLENDER

## (undated) DEVICE — RADIFOCUS OPTITORQUE ANGIOGRAPHIC CATHETER: Brand: OPTITORQUE

## (undated) DEVICE — TR BAND RADIAL ARTERY COMPRESSION DEVICE: Brand: TR BAND

## (undated) DEVICE — GUIDEWIRE WHOLEY .035 145 CM FLOP STR TIP

## (undated) DEVICE — DGW .035 FC J3MM 260CM TEF: Brand: EMERALD